# Patient Record
Sex: FEMALE | Race: WHITE | NOT HISPANIC OR LATINO | Employment: UNEMPLOYED | URBAN - METROPOLITAN AREA
[De-identification: names, ages, dates, MRNs, and addresses within clinical notes are randomized per-mention and may not be internally consistent; named-entity substitution may affect disease eponyms.]

---

## 2019-09-20 ENCOUNTER — HOSPITAL ENCOUNTER (OUTPATIENT)
Dept: FAMILY MEDICINE CLINIC | Facility: CLINIC | Age: 19
Discharge: HOME | End: 2019-09-20
Attending: NURSE PRACTITIONER

## 2019-09-22 LAB — BACTERIA UR CULT: NORMAL

## 2019-09-25 ENCOUNTER — HOSPITAL ENCOUNTER (OUTPATIENT)
Dept: URGENT CARE | Facility: CLINIC | Age: 19
Discharge: HOME | End: 2019-09-25
Attending: PHYSICIAN ASSISTANT

## 2019-10-21 ENCOUNTER — HOSPITAL ENCOUNTER (OUTPATIENT)
Dept: LABOR AND DELIVERY | Facility: HOSPITAL | Age: 19
Discharge: HOME | End: 2019-10-21
Attending: OBSTETRICS & GYNECOLOGY

## 2019-10-21 LAB — FIBRONECTIN FETAL VAG QL: NEGATIVE

## 2019-11-22 ENCOUNTER — HOSPITAL ENCOUNTER (OUTPATIENT)
Dept: PREADMISSION TESTING | Facility: HOSPITAL | Age: 19
Discharge: HOME | End: 2019-11-22
Attending: OBSTETRICS & GYNECOLOGY

## 2019-11-30 ENCOUNTER — HOSPITAL ENCOUNTER (OUTPATIENT)
Dept: URGENT CARE | Facility: CLINIC | Age: 19
Discharge: HOME | End: 2019-11-30
Attending: FAMILY MEDICINE

## 2019-12-08 ENCOUNTER — HOSPITAL ENCOUNTER (OUTPATIENT)
Dept: LABOR AND DELIVERY | Facility: HOSPITAL | Age: 19
Discharge: HOME | End: 2019-12-08
Attending: OBSTETRICS & GYNECOLOGY

## 2019-12-08 LAB
ALBUMIN SERPL-MCNC: 3.4 G/DL (ref 3.5–5)
ALBUMIN/GLOB SERPL: 1.3 {RATIO} (ref 1.4–2.6)
ALP SERPL-CCNC: 145 U/L (ref 42–98)
ALT SERPL-CCNC: 8 U/L (ref 10–40)
ANION GAP SERPL CALC-SCNC: 15 MMOL/L (ref 8–19)
AST SERPL-CCNC: 15 U/L (ref 15–50)
BASOPHILS # BLD AUTO: 0.03 10*3/UL (ref 0–0.2)
BASOPHILS NFR BLD AUTO: 0.4 % (ref 0–3)
BILIRUB SERPL-MCNC: 0.65 MG/DL (ref 0.2–1.3)
BUN SERPL-MCNC: 9 MG/DL (ref 5–25)
BUN/CREAT SERPL: 23 {RATIO} (ref 6–20)
CALCIUM SERPL-MCNC: 9.3 MG/DL (ref 8.7–10.4)
CHLORIDE SERPL-SCNC: 103 MMOL/L (ref 99–111)
CONV ABS IMM GRAN: 0.03 10*3/UL (ref 0–0.2)
CONV ALPHA-1-MICROGLOBULIN PLACENTAL (VAGINAL FLUID): NEGATIVE
CONV CO2: 20 MMOL/L (ref 22–32)
CONV IMMATURE GRAN: 0.4 % (ref 0–1.8)
CONV TOTAL PROTEIN: 6.1 G/DL (ref 6.3–8.2)
CREAT UR-MCNC: 0.4 MG/DL (ref 0.5–0.9)
DEPRECATED RDW RBC AUTO: 41.8 FL (ref 36.4–46.3)
EOSINOPHIL # BLD AUTO: 0.1 10*3/UL (ref 0–0.7)
EOSINOPHIL # BLD AUTO: 1.5 % (ref 0–7)
ERYTHROCYTE [DISTWIDTH] IN BLOOD BY AUTOMATED COUNT: 13.6 % (ref 11.7–14.4)
GFR SERPLBLD BASED ON 1.73 SQ M-ARVRAT: >60 ML/MIN/{1.73_M2}
GLOBULIN UR ELPH-MCNC: 2.7 G/DL (ref 2–3.5)
GLUCOSE SERPL-MCNC: 89 MG/DL (ref 65–99)
HCT VFR BLD AUTO: 27.8 % (ref 37–47)
HGB BLD-MCNC: 9.1 G/DL (ref 12–16)
LYMPHOCYTES # BLD AUTO: 0.91 10*3/UL (ref 1–5)
LYMPHOCYTES NFR BLD AUTO: 13.2 % (ref 20–45)
MCH RBC QN AUTO: 27.6 PG (ref 27–31)
MCHC RBC AUTO-ENTMCNC: 32.7 G/DL (ref 33–37)
MCV RBC AUTO: 84.2 FL (ref 81–99)
MONOCYTES # BLD AUTO: 0.65 10*3/UL (ref 0.2–1.2)
MONOCYTES NFR BLD AUTO: 9.4 % (ref 3–10)
NEUTROPHILS # BLD AUTO: 5.17 10*3/UL (ref 2–8)
NEUTROPHILS NFR BLD AUTO: 75.1 % (ref 30–85)
NRBC CBCN: 0 % (ref 0–0.7)
OSMOLALITY SERPL CALC.SUM OF ELEC: 276 MOSM/KG (ref 273–304)
PLATELET # BLD AUTO: 181 10*3/UL (ref 130–400)
PMV BLD AUTO: 9.6 FL (ref 9.4–12.3)
POTASSIUM SERPL-SCNC: 4 MMOL/L (ref 3.5–5.3)
RBC # BLD AUTO: 3.3 10*6/UL (ref 4.2–5.4)
SODIUM SERPL-SCNC: 134 MMOL/L (ref 135–147)
WBC # BLD AUTO: 6.89 10*3/UL (ref 4.8–10.8)

## 2019-12-25 ENCOUNTER — HOSPITAL ENCOUNTER (OUTPATIENT)
Dept: LABOR AND DELIVERY | Facility: HOSPITAL | Age: 19
Discharge: HOME | End: 2019-12-25
Attending: OBSTETRICS & GYNECOLOGY

## 2019-12-25 LAB — CONV ALPHA-1-MICROGLOBULIN PLACENTAL (VAGINAL FLUID): NEGATIVE

## 2020-05-07 ENCOUNTER — TELEMEDICINE CONVERTED (OUTPATIENT)
Dept: FAMILY MEDICINE CLINIC | Facility: CLINIC | Age: 20
End: 2020-05-07
Attending: NURSE PRACTITIONER

## 2020-08-11 ENCOUNTER — TELEPHONE CONVERTED (OUTPATIENT)
Dept: FAMILY MEDICINE CLINIC | Facility: CLINIC | Age: 20
End: 2020-08-11
Attending: NURSE PRACTITIONER

## 2021-05-13 NOTE — PROGRESS NOTES
Quick Note      Patient Name: Riri Martinez   Patient ID: [id number]   Sex: Female   YOB: 2000    Primary Care Provider: Iva JERONIMO    Visit Date: 2020    Provider: PHANI Merchant   Location: [facility]   Location Address: [address] [place] [address] [place]  [id number]   Location Phone: [phone]          History Of Present Illness  TELEHEALTH TELEPHONE VISIT  Chief Complaint: Anxiety and depression   Riri Martinez is a 20 year old /White female who is presenting for evaluation via telehealth telephone visit. Verbal consent obtained before beginning visit.   Provider spent 20 minutes with the patient during telehealth visit.   The following staff were present during this visit: JAGDEEP Charles   Past Medical History/Overview of Patient Symptoms  Riri Martinez is a 20 year old /White female who presents for evaluation and treatment of:      Call started @ 10:31am     She is pregnant again, she is considering an . She has a lot of stress. She says the father of the baby forced the pregnancy on her and now he doesn't want anythign to do with the baby. She has another baby at home who is 7 months    anxiety, she stopped the lexapro and she hasnt been able to go to work because her stress and anxiety are so bad. She needs to move in with her grandparents and needs a note from her doctor so that she can break her lease. Her rent is $850/ month. She would like something else that would be ok with pregnancy in case she decides to maintain the pregnancy    she has been sick for about a week. She thinks it's allergies, she has a productive cough and sinus drainage. She has a full bottle of amoxicillin but wasn't sure if she could take it with the pregnancy               Assessment  · Generalized anxiety disorder     300.02/F41.1  · URI (upper respiratory infection)     465.9/J06.9  · Unplanned  "pregnancy     V22.2/Z34.90      Plan  · Orders  o Physician Telephone Evaluation, 11-20 minutes (75158) - - 08/11/2020  o ACO-39: Current medications updated and reviewed () - - 08/11/2020  o ACO-14: Influenza immunization administered or previously received () - - 08/11/2020  · Medications  o Zoloft 50 mg oral tablet   SIG: take 1 tablet (50 mg) by oral route once daily   DISP: (30) tablets with 1 refills  Prescribed on 08/11/2020     o Lexapro 10 mg oral tablet   SIG: take 1 tablet (10 mg) by oral route once daily for 30 days   DISP: (30) tablets with 1 refills  Discontinued on 08/11/2020     o Medications have been Reconciled  o Transition of Care or Provider Policy  · Instructions  o Discussed the need for therapy, either with a certified counselor, psychologist, and/or family . If no improvement is noted or worsening of their condition, return to office or ER. But also discussed with patient that if they are non-responsive to the type of medication they may need to see a psychiatrist for further evaluation and management.  o Patient agrees to a \"No Self Harm\" contract. Patient will either call us, [facility], ER, [facility], [facility].  o Patient was given an SSRI/SSNRI medication and warned of possible side effects of the medication including potential for increased risk of suicidal thoughts and feelings. Patient was instructed that if they begin to exhibit any of these effects they will discontinue the medication immediately and contact our office or the ER ASAP.  o Plan Of Care: I gave her contact info for [facility], and we discussed adoption if she doesn' t want to keep the baby. I wrote a letter on her behalf to get her out of her lease. Will change to zoloft for the anxiety. She will use the amoxicillin that she has at home, if symptoms worsen I encouraged that she call back so I can order a covid test for her.  o Patient instructed to seek medical " attention urgently for new or worsening symptoms.  o Take all medications as prescribed/directed.  o Call the office with any concerns or questions.  o Discussed Covid-19 precautions including, but not limited to, social distancing, avoid touching your face, and hand washing.   · Disposition  o Call or Return if symptoms worsen or persist.            Electronically Signed by: PHANI Merchant -Author on August 11, 2020 11:26:22 AM

## 2021-05-13 NOTE — PROGRESS NOTES
"   Quick Note      Patient Name: Riri Martinez   Patient ID: [id number]   Sex: Female   YOB: 2000    Primary Care Provider: Iva JERONIMO    Visit Date: May 7, 2020    Provider: PHANI Velasquez   Location: [facility]   Location Address: [facility] [address]  [place]  [id number]   Location Phone: [phone]          History Of Present Illness  TELEHEALTH TELEPHONE VISIT  Chief Complaint: Anxiety   Riri Martinez is a 20 year old /White female who is presenting for evaluation via telehealth telephone visit. Verbal consent obtained before beginning visit.   Provider spent 9:21-9:36 minutes with the patient during telehealth visit.   The following staff were present during this visit: Amalia,ALICIA, pt, kcw(self)   Past Medical History/Overview of Patient Symptoms  Riri Martinez is a 20 year old /White female who presents for evaluation and treatment of:      Pt unable to do zoom    She has had a lot of stress and 3 weeks.  She feels that she has had a  to drive her around just to get her mind going.  No SI/HI noted.  She just doesn't even want to go to work.  She is back home.  [facility]  She has her own place.  She has her mom to help with the baby.  They do not talk right now (baby dad).  They are in a custody louis right now.  She has called in the last 2 Mondays and it has been bad.  She wakes up feeling like \"a rainy day\".  She would like to take something to ease her mind.  SHe is worried it will get like she did in 2017.  She feels that her anxiety will get the best of her.  She doesn't want to lose her place.           Assessment  · Generalized anxiety disorder     300.02/F41.1      Plan  · Orders  o Physician Telephone Evaluation, 11-20 minutes (18054) - - 05/07/2020  o ACO-39: Current medications updated and reviewed () - - 05/07/2020  o ACO-14: Influenza immunization administered or previously " received () - - 05/07/2020  · Medications  o Lexapro 10 mg oral tablet   SIG: take 1 tablet (10 mg) by oral route once daily for 30 days   DISP: (30) tablets with 1 refills  Prescribed on 05/07/2020     o Medications have been Reconciled  o Transition of Care or Provider Policy  · Instructions  o Plan Of Care:   o Take all medications as prescribed/directed.  o Call the office with any concerns or questions.  o She discussed about work. She will cont to work right now but if it becomes too overwhelming (she feels she is having another 2017 breakdown) then we will hold from working.   o We will start Lexapro at 10mg and she will call me in 2 weeks to let know how she doing and we may need to incrase to 20 but we will see how things go. Any SI/HI stop med and seek help. Call with any concerns or questions.   o Electronically Identified Patient Education Materials Provided Electronically  · Disposition  o Call or Return if symptoms worsen or persist.            Electronically Signed by: PHANI Velasquez -Author on May 7, 2020 09:37:26 AM

## 2021-07-22 ENCOUNTER — HOSPITAL ENCOUNTER (EMERGENCY)
Facility: HOSPITAL | Age: 21
Discharge: HOME | End: 2021-07-22
Attending: EMERGENCY MEDICINE | Admitting: EMERGENCY MEDICINE

## 2021-07-22 ENCOUNTER — APPOINTMENT (OUTPATIENT)
Dept: ULTRASOUND IMAGING | Facility: HOSPITAL | Age: 21
End: 2021-07-22

## 2021-07-22 VITALS
WEIGHT: 104.06 LBS | SYSTOLIC BLOOD PRESSURE: 103 MMHG | RESPIRATION RATE: 16 BRPM | HEIGHT: 59 IN | DIASTOLIC BLOOD PRESSURE: 64 MMHG | TEMPERATURE: 98.3 F | OXYGEN SATURATION: 98 % | HEART RATE: 84 BPM | BODY MASS INDEX: 20.98 KG/M2

## 2021-07-22 DIAGNOSIS — O20.0 MISCARRIAGE, THREATENED, EARLY PREGNANCY: Primary | ICD-10-CM

## 2021-07-22 LAB
ABO GROUP BLD: NORMAL
ALBUMIN SERPL-MCNC: 4.6 G/DL (ref 3.5–5.2)
ALBUMIN/GLOB SERPL: 2.1 G/DL
ALP SERPL-CCNC: 51 U/L (ref 39–117)
ALT SERPL W P-5'-P-CCNC: 8 U/L (ref 1–33)
ANION GAP SERPL CALCULATED.3IONS-SCNC: 13.3 MMOL/L (ref 5–15)
AST SERPL-CCNC: 10 U/L (ref 1–32)
BASOPHILS # BLD AUTO: 0.03 10*3/MM3 (ref 0–0.2)
BASOPHILS NFR BLD AUTO: 0.5 % (ref 0–1.5)
BILIRUB SERPL-MCNC: 0.5 MG/DL (ref 0–1.2)
BUN SERPL-MCNC: 14 MG/DL (ref 6–20)
BUN/CREAT SERPL: 22.2 (ref 7–25)
CALCIUM SPEC-SCNC: 9 MG/DL (ref 8.6–10.5)
CHLORIDE SERPL-SCNC: 104 MMOL/L (ref 98–107)
CO2 SERPL-SCNC: 21.7 MMOL/L (ref 22–29)
CREAT SERPL-MCNC: 0.63 MG/DL (ref 0.57–1)
DEPRECATED RDW RBC AUTO: 45.9 FL (ref 37–54)
EOSINOPHIL # BLD AUTO: 0.11 10*3/MM3 (ref 0–0.4)
EOSINOPHIL NFR BLD AUTO: 1.7 % (ref 0.3–6.2)
ERYTHROCYTE [DISTWIDTH] IN BLOOD BY AUTOMATED COUNT: 15.3 % (ref 12.3–15.4)
GFR SERPL CREATININE-BSD FRML MDRD: 117 ML/MIN/1.73
GLOBULIN UR ELPH-MCNC: 2.2 GM/DL
GLUCOSE SERPL-MCNC: 119 MG/DL (ref 65–99)
HCG INTACT+B SERPL-ACNC: 1238 MIU/ML
HCT VFR BLD AUTO: 31.7 % (ref 34–46.6)
HGB BLD-MCNC: 10.1 G/DL (ref 12–15.9)
IMM GRANULOCYTES # BLD AUTO: 0.02 10*3/MM3 (ref 0–0.05)
IMM GRANULOCYTES NFR BLD AUTO: 0.3 % (ref 0–0.5)
LYMPHOCYTES # BLD AUTO: 0.95 10*3/MM3 (ref 0.7–3.1)
LYMPHOCYTES NFR BLD AUTO: 14.6 % (ref 19.6–45.3)
MCH RBC QN AUTO: 26.1 PG (ref 26.6–33)
MCHC RBC AUTO-ENTMCNC: 31.9 G/DL (ref 31.5–35.7)
MCV RBC AUTO: 81.9 FL (ref 79–97)
MONOCYTES # BLD AUTO: 0.52 10*3/MM3 (ref 0.1–0.9)
MONOCYTES NFR BLD AUTO: 8 % (ref 5–12)
NEUTROPHILS NFR BLD AUTO: 4.86 10*3/MM3 (ref 1.7–7)
NEUTROPHILS NFR BLD AUTO: 74.9 % (ref 42.7–76)
NRBC BLD AUTO-RTO: 0 /100 WBC (ref 0–0.2)
PLATELET # BLD AUTO: 226 10*3/MM3 (ref 140–450)
PMV BLD AUTO: 10.4 FL (ref 6–12)
POTASSIUM SERPL-SCNC: 3.7 MMOL/L (ref 3.5–5.2)
PROT SERPL-MCNC: 6.8 G/DL (ref 6–8.5)
RBC # BLD AUTO: 3.87 10*6/MM3 (ref 3.77–5.28)
RH BLD: POSITIVE
SODIUM SERPL-SCNC: 139 MMOL/L (ref 136–145)
WBC # BLD AUTO: 6.49 10*3/MM3 (ref 3.4–10.8)

## 2021-07-22 PROCEDURE — 36415 COLL VENOUS BLD VENIPUNCTURE: CPT

## 2021-07-22 PROCEDURE — 86901 BLOOD TYPING SEROLOGIC RH(D): CPT | Performed by: PHYSICIAN ASSISTANT

## 2021-07-22 PROCEDURE — 85025 COMPLETE CBC W/AUTO DIFF WBC: CPT | Performed by: PHYSICIAN ASSISTANT

## 2021-07-22 PROCEDURE — 99282 EMERGENCY DEPT VISIT SF MDM: CPT

## 2021-07-22 PROCEDURE — 86900 BLOOD TYPING SEROLOGIC ABO: CPT | Performed by: PHYSICIAN ASSISTANT

## 2021-07-22 PROCEDURE — 80053 COMPREHEN METABOLIC PANEL: CPT | Performed by: PHYSICIAN ASSISTANT

## 2021-07-22 PROCEDURE — 99283 EMERGENCY DEPT VISIT LOW MDM: CPT

## 2021-07-22 PROCEDURE — 76817 TRANSVAGINAL US OBSTETRIC: CPT

## 2021-07-22 PROCEDURE — 84702 CHORIONIC GONADOTROPIN TEST: CPT | Performed by: PHYSICIAN ASSISTANT

## 2021-11-04 ENCOUNTER — HOSPITAL ENCOUNTER (EMERGENCY)
Facility: HOSPITAL | Age: 21
Discharge: HOME | End: 2021-11-04
Attending: EMERGENCY MEDICINE | Admitting: EMERGENCY MEDICINE

## 2021-11-04 ENCOUNTER — APPOINTMENT (OUTPATIENT)
Dept: CT IMAGING | Facility: HOSPITAL | Age: 21
End: 2021-11-04

## 2021-11-04 VITALS
RESPIRATION RATE: 16 BRPM | TEMPERATURE: 99.1 F | WEIGHT: 105.6 LBS | BODY MASS INDEX: 21.29 KG/M2 | SYSTOLIC BLOOD PRESSURE: 95 MMHG | HEIGHT: 59 IN | HEART RATE: 94 BPM | DIASTOLIC BLOOD PRESSURE: 64 MMHG | OXYGEN SATURATION: 98 %

## 2021-11-04 DIAGNOSIS — N39.0 ACUTE UTI: Primary | ICD-10-CM

## 2021-11-04 LAB
ALBUMIN SERPL-MCNC: 4.7 G/DL (ref 3.5–5.2)
ALBUMIN/GLOB SERPL: 1.6 G/DL
ALP SERPL-CCNC: 49 U/L (ref 39–117)
ALT SERPL W P-5'-P-CCNC: 8 U/L (ref 1–33)
ANION GAP SERPL CALCULATED.3IONS-SCNC: 11.2 MMOL/L (ref 5–15)
AST SERPL-CCNC: 11 U/L (ref 1–32)
BACTERIA UR QL AUTO: ABNORMAL /HPF
BASOPHILS # BLD AUTO: 0.04 10*3/MM3 (ref 0–0.2)
BASOPHILS NFR BLD AUTO: 0.8 % (ref 0–1.5)
BILIRUB SERPL-MCNC: 0.6 MG/DL (ref 0–1.2)
BILIRUB UR QL STRIP: NEGATIVE
BUN SERPL-MCNC: 11 MG/DL (ref 6–20)
BUN/CREAT SERPL: 19.3 (ref 7–25)
CALCIUM SPEC-SCNC: 9.1 MG/DL (ref 8.6–10.5)
CHLORIDE SERPL-SCNC: 104 MMOL/L (ref 98–107)
CLARITY UR: ABNORMAL
CO2 SERPL-SCNC: 21.8 MMOL/L (ref 22–29)
COLOR UR: YELLOW
CREAT SERPL-MCNC: 0.57 MG/DL (ref 0.57–1)
D-LACTATE SERPL-SCNC: 1.1 MMOL/L (ref 0.5–2)
DEPRECATED RDW RBC AUTO: 47.2 FL (ref 37–54)
EOSINOPHIL # BLD AUTO: 0.04 10*3/MM3 (ref 0–0.4)
EOSINOPHIL NFR BLD AUTO: 0.8 % (ref 0.3–6.2)
ERYTHROCYTE [DISTWIDTH] IN BLOOD BY AUTOMATED COUNT: 15.5 % (ref 12.3–15.4)
GFR SERPL CREATININE-BSD FRML MDRD: 131 ML/MIN/1.73
GLOBULIN UR ELPH-MCNC: 2.9 GM/DL
GLUCOSE SERPL-MCNC: 98 MG/DL (ref 65–99)
GLUCOSE UR STRIP-MCNC: NEGATIVE MG/DL
HCT VFR BLD AUTO: 33.4 % (ref 34–46.6)
HGB BLD-MCNC: 10.8 G/DL (ref 12–15.9)
HGB UR QL STRIP.AUTO: ABNORMAL
HOLD SPECIMEN: NORMAL
HOLD SPECIMEN: NORMAL
HYALINE CASTS UR QL AUTO: ABNORMAL /LPF
IMM GRANULOCYTES # BLD AUTO: 0.02 10*3/MM3 (ref 0–0.05)
IMM GRANULOCYTES NFR BLD AUTO: 0.4 % (ref 0–0.5)
KETONES UR QL STRIP: ABNORMAL
LEUKOCYTE ESTERASE UR QL STRIP.AUTO: NEGATIVE
LYMPHOCYTES # BLD AUTO: 0.22 10*3/MM3 (ref 0.7–3.1)
LYMPHOCYTES NFR BLD AUTO: 4.3 % (ref 19.6–45.3)
MCH RBC QN AUTO: 26.9 PG (ref 26.6–33)
MCHC RBC AUTO-ENTMCNC: 32.3 G/DL (ref 31.5–35.7)
MCV RBC AUTO: 83.3 FL (ref 79–97)
MONOCYTES # BLD AUTO: 0.58 10*3/MM3 (ref 0.1–0.9)
MONOCYTES NFR BLD AUTO: 11.2 % (ref 5–12)
NEUTROPHILS NFR BLD AUTO: 4.27 10*3/MM3 (ref 1.7–7)
NEUTROPHILS NFR BLD AUTO: 82.5 % (ref 42.7–76)
NITRITE UR QL STRIP: NEGATIVE
NRBC BLD AUTO-RTO: 0 /100 WBC (ref 0–0.2)
PH UR STRIP.AUTO: 5.5 [PH] (ref 5–8)
PLATELET # BLD AUTO: 177 10*3/MM3 (ref 140–450)
PMV BLD AUTO: 10 FL (ref 6–12)
POTASSIUM SERPL-SCNC: 3.8 MMOL/L (ref 3.5–5.2)
PROT SERPL-MCNC: 7.6 G/DL (ref 6–8.5)
PROT UR QL STRIP: ABNORMAL
RBC # BLD AUTO: 4.01 10*6/MM3 (ref 3.77–5.28)
RBC # UR: ABNORMAL /HPF
REF LAB TEST METHOD: ABNORMAL
RENAL EPI CELLS #/AREA URNS HPF: ABNORMAL /HPF
SODIUM SERPL-SCNC: 137 MMOL/L (ref 136–145)
SP GR UR STRIP: 1.03 (ref 1–1.03)
SQUAMOUS #/AREA URNS HPF: ABNORMAL /HPF
UROBILINOGEN UR QL STRIP: ABNORMAL
WBC # BLD AUTO: 5.17 10*3/MM3 (ref 3.4–10.8)
WBC UR QL AUTO: ABNORMAL /HPF
WHOLE BLOOD HOLD SPECIMEN: NORMAL
WHOLE BLOOD HOLD SPECIMEN: NORMAL

## 2021-11-04 PROCEDURE — 0 IOPAMIDOL PER 1 ML: Performed by: EMERGENCY MEDICINE

## 2021-11-04 PROCEDURE — 81001 URINALYSIS AUTO W/SCOPE: CPT | Performed by: EMERGENCY MEDICINE

## 2021-11-04 PROCEDURE — 83605 ASSAY OF LACTIC ACID: CPT | Performed by: EMERGENCY MEDICINE

## 2021-11-04 PROCEDURE — 74177 CT ABD & PELVIS W/CONTRAST: CPT

## 2021-11-04 PROCEDURE — 96365 THER/PROPH/DIAG IV INF INIT: CPT

## 2021-11-04 PROCEDURE — 80053 COMPREHEN METABOLIC PANEL: CPT | Performed by: EMERGENCY MEDICINE

## 2021-11-04 PROCEDURE — 36415 COLL VENOUS BLD VENIPUNCTURE: CPT

## 2021-11-04 PROCEDURE — 25010000002 CEFTRIAXONE PER 250 MG: Performed by: EMERGENCY MEDICINE

## 2021-11-04 PROCEDURE — 25010000002 ONDANSETRON PER 1 MG: Performed by: EMERGENCY MEDICINE

## 2021-11-04 PROCEDURE — 87040 BLOOD CULTURE FOR BACTERIA: CPT | Performed by: EMERGENCY MEDICINE

## 2021-11-04 PROCEDURE — 96375 TX/PRO/DX INJ NEW DRUG ADDON: CPT

## 2021-11-04 PROCEDURE — 25010000002 KETOROLAC TROMETHAMINE PER 15 MG: Performed by: EMERGENCY MEDICINE

## 2021-11-04 PROCEDURE — 85025 COMPLETE CBC W/AUTO DIFF WBC: CPT | Performed by: EMERGENCY MEDICINE

## 2021-11-04 PROCEDURE — 99283 EMERGENCY DEPT VISIT LOW MDM: CPT

## 2021-11-04 RX ORDER — CEFTRIAXONE SODIUM 1 G/50ML
1 INJECTION, SOLUTION INTRAVENOUS ONCE
Status: COMPLETED | OUTPATIENT
Start: 2021-11-04 | End: 2021-11-04

## 2021-11-04 RX ORDER — ONDANSETRON 2 MG/ML
4 INJECTION INTRAMUSCULAR; INTRAVENOUS ONCE
Status: COMPLETED | OUTPATIENT
Start: 2021-11-04 | End: 2021-11-04

## 2021-11-04 RX ORDER — SODIUM CHLORIDE 0.9 % (FLUSH) 0.9 %
10 SYRINGE (ML) INJECTION AS NEEDED
Status: DISCONTINUED | OUTPATIENT
Start: 2021-11-04 | End: 2021-11-04 | Stop reason: HOSPADM

## 2021-11-04 RX ORDER — CEPHALEXIN 500 MG/1
500 CAPSULE ORAL 4 TIMES DAILY
Qty: 40 CAPSULE | Refills: 0 | Status: SHIPPED | OUTPATIENT
Start: 2021-11-04 | End: 2022-07-19

## 2021-11-04 RX ORDER — KETOROLAC TROMETHAMINE 30 MG/ML
30 INJECTION, SOLUTION INTRAMUSCULAR; INTRAVENOUS ONCE
Status: COMPLETED | OUTPATIENT
Start: 2021-11-04 | End: 2021-11-04

## 2021-11-04 RX ADMIN — ONDANSETRON 4 MG: 2 INJECTION INTRAMUSCULAR; INTRAVENOUS at 17:55

## 2021-11-04 RX ADMIN — KETOROLAC TROMETHAMINE 30 MG: 30 INJECTION, SOLUTION INTRAMUSCULAR at 17:55

## 2021-11-04 RX ADMIN — IOPAMIDOL 75 ML: 755 INJECTION, SOLUTION INTRAVENOUS at 19:30

## 2021-11-04 RX ADMIN — SODIUM CHLORIDE 1000 ML: 9 INJECTION, SOLUTION INTRAVENOUS at 17:54

## 2021-11-04 RX ADMIN — CEFTRIAXONE SODIUM 1 G: 1 INJECTION, SOLUTION INTRAVENOUS at 20:47

## 2021-11-04 NOTE — ED PROVIDER NOTES
Time: 5:34 PM EDT  Arrived by: private car  Chief Complaint: fever  History provided by: patient  History is limited by: N/A     History of Present Illness:  Patient is a 21 y.o. year old female that presents to the emergency department with fever, chills, muscle ache, and decreased urination that started earlier today.  Patient denies cough and hemoptysis.  She does report nausea with no emesis.  Patient denies diarrhea.  She does report lower abdominal pain with no radiation.  She states that the pain is better when staying still and worse with movement and palpation.        Abdominal Pain  Pain location:  Suprapubic  Pain quality: dull    Pain radiates to:  Does not radiate  Pain severity:  Mild  Onset quality:  Sudden  Duration:  1 day  Timing:  Constant  Progression:  Worsening  Chronicity:  New  Associated symptoms: dysuria, fever and nausea    Associated symptoms: no chest pain, no chills, no cough, no diarrhea, no shortness of breath, no sore throat and no vomiting    Fever  Associated symptoms: dysuria and nausea    Associated symptoms: no chest pain, no chills, no congestion, no cough, no diarrhea, no headaches, no myalgias, no rhinorrhea, no sore throat and no vomiting    Nausea  The primary symptoms include fever, abdominal pain, nausea and dysuria. Primary symptoms do not include vomiting, diarrhea or myalgias.   The illness does not include chills.   Dysuria  Associated symptoms: abdominal pain, fever and nausea    Associated symptoms: no vomiting        Similar Symptoms Previously: no  Recently seen: no      Patient Care Team  Primary Care Provider: Provider, No Known    Past Medical History:     Allergies   Allergen Reactions   • Latex Unknown - Low Severity   • Adhesive Tape Rash     Past Medical History:   Diagnosis Date   • Arthritis    • Chronic back pain      Past Surgical History:   Procedure Laterality Date   • BACK SURGERY      tethered cord   • HIP SURGERY Left    • REPAIR PATELLAR TENDON  "Bilateral      History reviewed. No pertinent family history.    Home Medications:  Prior to Admission medications    Medication Sig Start Date End Date Taking? Authorizing Provider   Prenatal MV-Min-Fe Fum-FA-DHA (PRENATAL 1 PO) Take  by mouth.    Provider, MD Jimena        Social History:   Social History     Tobacco Use   • Smoking status: Never Smoker   • Smokeless tobacco: Never Used   Substance Use Topics   • Alcohol use: Never   • Drug use: Never     Recent travel: no     Review of Systems:  Review of Systems   Constitutional: Positive for fever. Negative for chills.   HENT: Negative for congestion, rhinorrhea and sore throat.    Eyes: Negative for pain and visual disturbance.   Respiratory: Negative for apnea, cough, chest tightness and shortness of breath.    Cardiovascular: Negative for chest pain and palpitations.   Gastrointestinal: Positive for abdominal pain and nausea. Negative for diarrhea and vomiting.   Genitourinary: Positive for dysuria. Negative for difficulty urinating.   Musculoskeletal: Negative for joint swelling and myalgias.   Skin: Negative for color change.   Neurological: Negative for seizures and headaches.   Psychiatric/Behavioral: Negative.    All other systems reviewed and are negative.       Physical Exam:  BP 99/60   Pulse 96   Temp (!) 101 °F (38.3 °C) (Oral)   Resp 18   Ht 149.9 cm (59\")   Wt 47.9 kg (105 lb 9.6 oz)   LMP 10/08/2021   SpO2 98%   Breastfeeding Unknown   BMI 21.33 kg/m²     Physical Exam  Vitals and nursing note reviewed.   Constitutional:       General: She is not in acute distress.     Appearance: Normal appearance. She is not toxic-appearing.   HENT:      Head: Normocephalic and atraumatic.      Jaw: There is normal jaw occlusion.   Eyes:      General: Lids are normal.      Extraocular Movements: Extraocular movements intact.      Conjunctiva/sclera: Conjunctivae normal.      Pupils: Pupils are equal, round, and reactive to light. "   Cardiovascular:      Rate and Rhythm: Normal rate and regular rhythm.      Pulses: Normal pulses.      Heart sounds: Normal heart sounds.   Pulmonary:      Effort: Pulmonary effort is normal. No respiratory distress.      Breath sounds: Normal breath sounds. No wheezing or rhonchi.   Abdominal:      General: Abdomen is flat.      Palpations: Abdomen is soft.      Tenderness: There is no abdominal tenderness. There is no guarding or rebound.   Musculoskeletal:         General: Normal range of motion.      Cervical back: Normal range of motion and neck supple.      Right lower leg: No edema.      Left lower leg: No edema.   Skin:     General: Skin is warm and dry.   Neurological:      Mental Status: She is alert and oriented to person, place, and time. Mental status is at baseline.   Psychiatric:         Mood and Affect: Mood normal.                Medications in the Emergency Department:  Medications   sodium chloride 0.9 % flush 10 mL (has no administration in time range)   cefTRIAXone (ROCEPHIN) IVPB 1 g (has no administration in time range)   sodium chloride 0.9 % bolus 1,000 mL (1,000 mL Intravenous New Bag 11/4/21 1754)   ondansetron (ZOFRAN) injection 4 mg (4 mg Intravenous Given 11/4/21 1755)   ketorolac (TORADOL) injection 30 mg (30 mg Intravenous Given 11/4/21 1755)   iopamidol (ISOVUE-370) 76 % injection 100 mL (75 mL Intravenous Given 11/4/21 1930)        Labs  Lab Results (last 24 hours)     Procedure Component Value Units Date/Time    CBC & Differential [[id number]]  (Abnormal) Collected: 11/04/21 1719    Specimen: Blood from Arm, Right Updated: 11/04/21 1727    Narrative:      The following orders were created for panel order CBC & Differential.  Procedure                               Abnormality         Status                     ---------                               -----------         ------                     CBC Auto Differential[[id number]]        Abnormal            Final result                  Please view results for these tests on the individual orders.    Comprehensive Metabolic Panel [[id number]]  (Abnormal) Collected: 11/04/21 1719    Specimen: Blood from Arm, Right Updated: 11/04/21 1759     Glucose 98 mg/dL      BUN 11 mg/dL      Creatinine 0.57 mg/dL      Sodium 137 mmol/L      Potassium 3.8 mmol/L      Chloride 104 mmol/L      CO2 21.8 mmol/L      Calcium 9.1 mg/dL      Total Protein 7.6 g/dL      Albumin 4.70 g/dL      ALT (SGPT) 8 U/L      AST (SGOT) 11 U/L      Alkaline Phosphatase 49 U/L      Total Bilirubin 0.6 mg/dL      eGFR Non African Amer 131 mL/min/1.73      Globulin 2.9 gm/dL      A/G Ratio 1.6 g/dL      BUN/Creatinine Ratio 19.3     Anion Gap 11.2 mmol/L     Narrative:      GFR Normal >60  Chronic Kidney Disease <60  Kidney Failure <15      Lactic Acid, Plasma [[id number]]  (Normal) Collected: 11/04/21 1719    Specimen: Blood from Arm, Right Updated: 11/04/21 1755     Lactate 1.1 mmol/L     Blood Culture - Blood, Arm, Right [[id number]] Collected: 11/04/21 1719    Specimen: Blood from Arm, Right Updated: 11/04/21 1723    CBC Auto Differential [[id number]]  (Abnormal) Collected: 11/04/21 1719    Specimen: Blood from Arm, Right Updated: 11/04/21 1727     WBC 5.17 10*3/mm3      RBC 4.01 10*6/mm3      Hemoglobin 10.8 g/dL      Hematocrit 33.4 %      MCV 83.3 fL      MCH 26.9 pg      MCHC 32.3 g/dL      RDW 15.5 %      RDW-SD 47.2 fl      MPV 10.0 fL      Platelets 177 10*3/mm3      Neutrophil % 82.5 %      Lymphocyte % 4.3 %      Monocyte % 11.2 %      Eosinophil % 0.8 %      Basophil % 0.8 %      Immature Grans % 0.4 %      Neutrophils, Absolute 4.27 10*3/mm3      Lymphocytes, Absolute 0.22 10*3/mm3      Monocytes, Absolute 0.58 10*3/mm3      Eosinophils, Absolute 0.04 10*3/mm3      Basophils, Absolute 0.04 10*3/mm3      Immature Grans, Absolute 0.02 10*3/mm3      nRBC 0.0 /100 WBC     Blood Culture - Blood, Arm, Right [[id number]] Collected: 11/04/21 7249    Specimen: Blood from Arm,  Right Updated: 11/04/21 1734    Urinalysis With Culture If Indicated - Urine, Clean Catch [[id number]]  (Abnormal) Collected: 11/04/21 1758    Specimen: Urine, Clean Catch Updated: 11/04/21 1900     Color, UA Yellow     Appearance, UA Turbid     pH, UA 5.5     Specific Gravity, UA 1.027     Glucose, UA Negative     Ketones, UA 40 mg/dL (2+)     Bilirubin, UA Negative     Blood, UA Trace     Protein, UA Trace     Leuk Esterase, UA Negative     Nitrite, UA Negative     Urobilinogen, UA 0.2 E.U./dL    Urinalysis, Microscopic Only - Urine, Clean Catch [[id number]]  (Abnormal) Collected: 11/04/21 1758    Specimen: Urine, Clean Catch Updated: 11/04/21 1900     RBC, UA 3-5 /HPF      WBC, UA 3-5 /HPF      Bacteria, UA Trace /HPF      Squamous Epithelial Cells, UA 7-12 /HPF      Renal Epithelial Cells, UA 3-6 /HPF      Hyaline Casts, UA None Seen /LPF      Methodology Manual Light Microscopy           Imaging:  CT Abdomen Pelvis With Contrast    Result Date: 11/4/2021  PROCEDURE: CT ABDOMEN PELVIS W CONTRAST  COMPARISON: [facility], CT, ABDOMEN/PELVIS WITH CONTRAST, 4/14/2016, 22:57.  INDICATIONS: left abdominal pain today  TECHNIQUE: CT images were created with non-ionic intravenous contrast material.   PROTOCOL:   Standard imaging protocol performed    RADIATION:   DLP: 264.1mGy*cm   Automated exposure control was utilized to minimize radiation dose. CONTRAST: 75cc Isovue 370 I.V.  FINDINGS:  The lung bases are clear.  The liver is of normal size and uniform density.  The gallbladder is not abnormally distended.  No pancreatic or adrenal mass is evident.  The spleen is of normal size.  The kidneys enhance bilaterally.  No renal or ureteral stones are seen.  There is no evidence of hydronephrosis.  The urinary bladder is not abnormally distended.  The uterus and ovaries have an unremarkable appearance.  Bowel loops are not abnormally dilated.  The anterior abdominal wall is intact, no hernia is evident.  Bony  structures appear intact.  CONCLUSION: Negative CT scan of the abdomen and pelvis with IV contrast.     NICOLE LOYD MD       Electronically Signed and Approved By: NICOLE LOYD MD on 11/04/2021 at 19:47               Procedures:  Procedures    Progress                            Medical Decision Making:  MDM  Number of Diagnoses or Management Options  Acute UTI  Diagnosis management comments: The patient´s CBC was reviewed and shows no abnormalities of critical concern. Of note, there is no anemia requiring a blood transfusion and the platelet count is acceptable.  The patient´s CMP was reviewed and shows no abnormalities of critical concern. Of note, the patient´s sodium and potassium are acceptable. The patient´s liver enzymes are unremarkable. The patient´s renal function (creatinine) is preserved. The patient has a normal anion gap.  Urinalysis shows trace bacteriuria with white blood cells.  Patient was given Rocephin in the emergency department.  Patient also also given a 1 L normal saline bolus.  CT scan the abdomen pelvis is negative for acute intra-abdominal pathology.  Based on the results of the patient´s urinalysis and urinary complaints, signs, symptoms, and diagnostic testing is consistent with a urinary tract infection. Patient is resting comfortably, is alert, and is in no distress. The repeat examination is unremarkable and benign. The patient has no signs of urosepsis. The patient was started on antibiotics in the emergency department and will be discharged with antibiotics as an outpatient. The patient was counseled to return to the ER for fever >100.5, intractable pain or vomiting, or any other concerns that the may have. The patient has expressed a clear and thorough understanding and agreed to follow up as instructed.        Amount and/or Complexity of Data Reviewed  Clinical lab tests: reviewed  Tests in the radiology section of CPT®: reviewed  Independent visualization of images,  tracings, or specimens: yes    Risk of Complications, Morbidity, and/or Mortality  Presenting problems: moderate  Management options: moderate         Final diagnoses:   Acute UTI        Disposition:  ED Disposition     ED Disposition Condition Comment    Discharge Stable           This medical record created using voice recognition software and may contain unintended errors.             Savanna Duffy MD  11/04/21 2040

## 2021-11-09 LAB
BACTERIA SPEC AEROBE CULT: NORMAL
BACTERIA SPEC AEROBE CULT: NORMAL

## 2022-01-16 ENCOUNTER — HOSPITAL ENCOUNTER (EMERGENCY)
Facility: HOSPITAL | Age: 22
Discharge: HOME | End: 2022-01-17
Attending: EMERGENCY MEDICINE | Admitting: EMERGENCY MEDICINE

## 2022-01-16 VITALS
HEART RATE: 108 BPM | OXYGEN SATURATION: 100 % | HEIGHT: 59 IN | TEMPERATURE: 98.2 F | WEIGHT: 98.55 LBS | SYSTOLIC BLOOD PRESSURE: 111 MMHG | RESPIRATION RATE: 14 BRPM | BODY MASS INDEX: 19.87 KG/M2 | DIASTOLIC BLOOD PRESSURE: 66 MMHG

## 2022-01-16 DIAGNOSIS — O21.0 HYPEREMESIS GRAVIDARUM: Primary | ICD-10-CM

## 2022-01-16 PROCEDURE — 36415 COLL VENOUS BLD VENIPUNCTURE: CPT

## 2022-01-16 PROCEDURE — 99283 EMERGENCY DEPT VISIT LOW MDM: CPT

## 2022-01-16 PROCEDURE — 84702 CHORIONIC GONADOTROPIN TEST: CPT | Performed by: EMERGENCY MEDICINE

## 2022-01-16 PROCEDURE — 85025 COMPLETE CBC W/AUTO DIFF WBC: CPT | Performed by: EMERGENCY MEDICINE

## 2022-01-16 PROCEDURE — 83690 ASSAY OF LIPASE: CPT | Performed by: EMERGENCY MEDICINE

## 2022-01-16 PROCEDURE — 80053 COMPREHEN METABOLIC PANEL: CPT | Performed by: EMERGENCY MEDICINE

## 2022-01-16 RX ORDER — SODIUM CHLORIDE 0.9 % (FLUSH) 0.9 %
10 SYRINGE (ML) INJECTION AS NEEDED
Status: DISCONTINUED | OUTPATIENT
Start: 2022-01-16 | End: 2022-01-17 | Stop reason: HOSPADM

## 2022-01-17 LAB
ALBUMIN SERPL-MCNC: 4.7 G/DL (ref 3.5–5.2)
ALBUMIN/GLOB SERPL: 1.4 G/DL
ALP SERPL-CCNC: 53 U/L (ref 39–117)
ALT SERPL W P-5'-P-CCNC: 9 U/L (ref 1–33)
ANION GAP SERPL CALCULATED.3IONS-SCNC: 15.1 MMOL/L (ref 5–15)
AST SERPL-CCNC: 15 U/L (ref 1–32)
BACTERIA UR QL AUTO: ABNORMAL /HPF
BASOPHILS # BLD AUTO: 0.02 10*3/MM3 (ref 0–0.2)
BASOPHILS NFR BLD AUTO: 0.3 % (ref 0–1.5)
BILIRUB SERPL-MCNC: 0.6 MG/DL (ref 0–1.2)
BILIRUB UR QL STRIP: NEGATIVE
BUN SERPL-MCNC: 12 MG/DL (ref 6–20)
BUN/CREAT SERPL: 21.8 (ref 7–25)
CALCIUM SPEC-SCNC: 9.8 MG/DL (ref 8.6–10.5)
CHLORIDE SERPL-SCNC: 101 MMOL/L (ref 98–107)
CLARITY UR: ABNORMAL
CO2 SERPL-SCNC: 21.9 MMOL/L (ref 22–29)
COLOR UR: YELLOW
CREAT SERPL-MCNC: 0.55 MG/DL (ref 0.57–1)
DEPRECATED RDW RBC AUTO: 45.5 FL (ref 37–54)
EOSINOPHIL # BLD AUTO: 0.03 10*3/MM3 (ref 0–0.4)
EOSINOPHIL NFR BLD AUTO: 0.5 % (ref 0.3–6.2)
ERYTHROCYTE [DISTWIDTH] IN BLOOD BY AUTOMATED COUNT: 15.2 % (ref 12.3–15.4)
GFR SERPL CREATININE-BSD FRML MDRD: 136 ML/MIN/1.73
GLOBULIN UR ELPH-MCNC: 3.3 GM/DL
GLUCOSE SERPL-MCNC: 119 MG/DL (ref 65–99)
GLUCOSE UR STRIP-MCNC: NEGATIVE MG/DL
HCG INTACT+B SERPL-ACNC: NORMAL MIU/ML
HCT VFR BLD AUTO: 36 % (ref 34–46.6)
HGB BLD-MCNC: 11.9 G/DL (ref 12–15.9)
HGB UR QL STRIP.AUTO: NEGATIVE
HOLD SPECIMEN: NORMAL
HOLD SPECIMEN: NORMAL
HYALINE CASTS UR QL AUTO: ABNORMAL /LPF
IMM GRANULOCYTES # BLD AUTO: 0.02 10*3/MM3 (ref 0–0.05)
IMM GRANULOCYTES NFR BLD AUTO: 0.3 % (ref 0–0.5)
KETONES UR QL STRIP: ABNORMAL
LEUKOCYTE ESTERASE UR QL STRIP.AUTO: NEGATIVE
LIPASE SERPL-CCNC: 32 U/L (ref 13–60)
LYMPHOCYTES # BLD AUTO: 0.38 10*3/MM3 (ref 0.7–3.1)
LYMPHOCYTES NFR BLD AUTO: 6.2 % (ref 19.6–45.3)
MCH RBC QN AUTO: 27.2 PG (ref 26.6–33)
MCHC RBC AUTO-ENTMCNC: 33.1 G/DL (ref 31.5–35.7)
MCV RBC AUTO: 82.4 FL (ref 79–97)
MONOCYTES # BLD AUTO: 0.48 10*3/MM3 (ref 0.1–0.9)
MONOCYTES NFR BLD AUTO: 7.8 % (ref 5–12)
MUCOUS THREADS URNS QL MICRO: ABNORMAL /HPF
NEUTROPHILS NFR BLD AUTO: 5.23 10*3/MM3 (ref 1.7–7)
NEUTROPHILS NFR BLD AUTO: 84.9 % (ref 42.7–76)
NITRITE UR QL STRIP: NEGATIVE
NRBC BLD AUTO-RTO: 0 /100 WBC (ref 0–0.2)
PH UR STRIP.AUTO: 6 [PH] (ref 5–8)
PLATELET # BLD AUTO: 244 10*3/MM3 (ref 140–450)
PMV BLD AUTO: 9.7 FL (ref 6–12)
POTASSIUM SERPL-SCNC: 3.6 MMOL/L (ref 3.5–5.2)
PROT SERPL-MCNC: 8 G/DL (ref 6–8.5)
PROT UR QL STRIP: ABNORMAL
RBC # BLD AUTO: 4.37 10*6/MM3 (ref 3.77–5.28)
RBC # UR STRIP: ABNORMAL /HPF
REF LAB TEST METHOD: ABNORMAL
SODIUM SERPL-SCNC: 138 MMOL/L (ref 136–145)
SP GR UR STRIP: >1.03 (ref 1–1.03)
SQUAMOUS #/AREA URNS HPF: ABNORMAL /HPF
UROBILINOGEN UR QL STRIP: ABNORMAL
WBC # UR STRIP: ABNORMAL /HPF
WBC NRBC COR # BLD: 6.16 10*3/MM3 (ref 3.4–10.8)
WHOLE BLOOD HOLD SPECIMEN: NORMAL
WHOLE BLOOD HOLD SPECIMEN: NORMAL

## 2022-01-17 PROCEDURE — 25010000002 DIPHENHYDRAMINE PER 50 MG: Performed by: NURSE PRACTITIONER

## 2022-01-17 PROCEDURE — 96374 THER/PROPH/DIAG INJ IV PUSH: CPT

## 2022-01-17 PROCEDURE — 25010000002 METOCLOPRAMIDE PER 10 MG: Performed by: NURSE PRACTITIONER

## 2022-01-17 PROCEDURE — 96361 HYDRATE IV INFUSION ADD-ON: CPT

## 2022-01-17 PROCEDURE — 81001 URINALYSIS AUTO W/SCOPE: CPT | Performed by: EMERGENCY MEDICINE

## 2022-01-17 PROCEDURE — 96375 TX/PRO/DX INJ NEW DRUG ADDON: CPT

## 2022-01-17 PROCEDURE — 25010000002 ONDANSETRON PER 1 MG: Performed by: NURSE PRACTITIONER

## 2022-01-17 RX ORDER — ONDANSETRON 4 MG/1
4 TABLET, ORALLY DISINTEGRATING ORAL 4 TIMES DAILY PRN
Qty: 20 TABLET | Refills: 0 | Status: SHIPPED | OUTPATIENT
Start: 2022-01-17 | End: 2022-07-19

## 2022-01-17 RX ORDER — METOCLOPRAMIDE HYDROCHLORIDE 5 MG/ML
10 INJECTION INTRAMUSCULAR; INTRAVENOUS ONCE
Status: COMPLETED | OUTPATIENT
Start: 2022-01-17 | End: 2022-01-17

## 2022-01-17 RX ORDER — ONDANSETRON 2 MG/ML
4 INJECTION INTRAMUSCULAR; INTRAVENOUS ONCE
Status: COMPLETED | OUTPATIENT
Start: 2022-01-17 | End: 2022-01-17

## 2022-01-17 RX ORDER — DIPHENHYDRAMINE HYDROCHLORIDE 50 MG/ML
25 INJECTION INTRAMUSCULAR; INTRAVENOUS ONCE
Status: COMPLETED | OUTPATIENT
Start: 2022-01-17 | End: 2022-01-17

## 2022-01-17 RX ORDER — DOXYLAMINE SUCCINATE AND PYRIDOXINE HYDROCHLORIDE, DELAYED RELEASE TABLETS 10 MG/10 MG 10; 10 MG/1; MG/1
2 TABLET, DELAYED RELEASE ORAL NIGHTLY PRN
Qty: 60 TABLET | Refills: 0 | Status: SHIPPED | OUTPATIENT
Start: 2022-01-17 | End: 2022-07-19

## 2022-01-17 RX ADMIN — DIPHENHYDRAMINE HYDROCHLORIDE 25 MG: 50 INJECTION, SOLUTION INTRAMUSCULAR; INTRAVENOUS at 03:37

## 2022-01-17 RX ADMIN — METOCLOPRAMIDE HYDROCHLORIDE 10 MG: 5 INJECTION INTRAMUSCULAR; INTRAVENOUS at 03:37

## 2022-01-17 RX ADMIN — ONDANSETRON 4 MG: 2 INJECTION INTRAMUSCULAR; INTRAVENOUS at 00:28

## 2022-01-17 RX ADMIN — SODIUM CHLORIDE 1000 ML: 9 INJECTION, SOLUTION INTRAVENOUS at 00:28

## 2022-01-17 NOTE — DISCHARGE INSTRUCTIONS
Clear liquids.  Advance diet as tolerated.    Take medications for treatment of vomiting.    Follow-up with your OB/GYN    Return for new or worsening symptoms

## 2022-01-17 NOTE — ED PROVIDER NOTES
Time: 05:30 EST  Arrived by: Private vehicle  Chief Complaint: Vomiting  History provided by: Patient  History is limited by: N/A    History of Present Illness:  Patient is a 22 y.o. year old female that presents to the emergency department with excessive vomiting.  Patient is 10 weeks pregnant.  G4, P1 Ab2      History provided by:  Patient  Vomiting  The primary symptoms include nausea and vomiting ( 25 times in past 2 days). Primary symptoms do not include fever, weight loss, fatigue, abdominal pain, diarrhea, hematemesis, jaundice, hematochezia, dysuria, myalgias, arthralgias or rash. The illness began 2 days ago. The problem has not changed since onset.  The illness does not include chills, constipation, tenesmus or back pain.       Similar Symptoms Previously: Had hyperemesis with 1 pregnancy before but never this bad  Recently seen: Routine OB/GYN visits with Dr. Angelo      Patient Care Team  Primary Care Provider: None    Past Medical History:     Allergies   Allergen Reactions   • Latex Unknown - Low Severity   • Adhesive Tape Rash     Past Medical History:   Diagnosis Date   • Arthritis    • Chronic back pain      Past Surgical History:   Procedure Laterality Date   • BACK SURGERY      tethered cord   • HIP SURGERY Left    • REPAIR PATELLAR TENDON Bilateral      History reviewed. No pertinent family history.    Home Medications:  Prior to Admission medications    Medication Sig Start Date End Date Taking? Authorizing Provider   cephalexin (KEFLEX) 500 MG capsule Take 1 capsule by mouth 4 (Four) Times a Day. 11/4/21   Savanna Duffy MD   Prenatal MV-Min-Fe Fum-FA-DHA (PRENATAL 1 PO) Take  by mouth.    Provider, MD Jimena        Social History:   PT  reports that she has never smoked. She has never used smokeless tobacco. She reports that she does not drink alcohol and does not use drugs.    Record Review:  I have reviewed the patient's records in [employer].     Review of Systems  Review of Systems  "  Constitutional: Negative for chills, fatigue, fever and weight loss.   HENT: Negative for congestion, ear pain and sore throat.    Eyes: Negative for pain.   Respiratory: Negative for cough, chest tightness and shortness of breath.    Cardiovascular: Negative for chest pain.   Gastrointestinal: Positive for nausea and vomiting ( 25 times in past 2 days). Negative for abdominal pain, constipation, diarrhea, hematemesis, hematochezia and jaundice.   Genitourinary: Positive for decreased urine volume ( 4 times in 2 days). Negative for dysuria, flank pain and hematuria.   Musculoskeletal: Negative for arthralgias, back pain, joint swelling and myalgias.   Skin: Negative for pallor and rash.   Neurological: Negative for seizures and headaches.   Hematological: Negative.    Psychiatric/Behavioral: Negative.    All other systems reviewed and are negative.       Physical Exam  /66 (BP Location: Left arm, Patient Position: Sitting)   Pulse 108   Temp 98.2 °F (36.8 °C) (Oral)   Resp 14   Ht 149.9 cm (59\")   Wt 44.7 kg (98 lb 8.7 oz)   LMP 10/08/2021   SpO2 100%   BMI 19.90 kg/m²     Physical Exam  Vitals and nursing note reviewed.   Constitutional:       General: She is not in acute distress.     Appearance: She is ill-appearing ( Patient has sickly appearance). She is not toxic-appearing.      Comments: Patient frail looking   HENT:      Head: Normocephalic and atraumatic.      Right Ear: Tympanic membrane, ear canal and external ear normal.      Left Ear: Tympanic membrane, ear canal and external ear normal.      Nose: Nose normal.      Mouth/Throat:      Mouth: Mucous membranes are moist.   Eyes:      General: No scleral icterus.     Conjunctiva/sclera: Conjunctivae normal.   Cardiovascular:      Rate and Rhythm: Regular rhythm. Tachycardia present.      Pulses: Normal pulses.      Heart sounds: Normal heart sounds.   Pulmonary:      Effort: Pulmonary effort is normal. No respiratory distress.      Breath " "sounds: Normal breath sounds.   Abdominal:      General: Abdomen is flat. Bowel sounds are normal.      Palpations: Abdomen is soft.      Tenderness: There is no abdominal tenderness. There is no right CVA tenderness or left CVA tenderness.   Musculoskeletal:         General: Normal range of motion.      Cervical back: Normal range of motion and neck supple.   Skin:     General: Skin is warm and dry.      Coloration: Skin is pale.   Neurological:      Mental Status: She is alert and oriented to person, place, and time.   Psychiatric:         Mood and Affect: Mood normal.         Behavior: Behavior normal.          ED Course  /66 (BP Location: Left arm, Patient Position: Sitting)   Pulse 108   Temp 98.2 °F (36.8 °C) (Oral)   Resp 14   Ht 149.9 cm (59\")   Wt 44.7 kg (98 lb 8.7 oz)   LMP 10/08/2021   SpO2 100%   BMI 19.90 kg/m²   Results for orders placed or performed during the hospital encounter of 01/16/22   Comprehensive Metabolic Panel    Specimen: Blood   Result Value Ref Range    Glucose 119 (H) 65 - 99 mg/dL    BUN 12 6 - 20 mg/dL    Creatinine 0.55 (L) 0.57 - 1.00 mg/dL    Sodium 138 136 - 145 mmol/L    Potassium 3.6 3.5 - 5.2 mmol/L    Chloride 101 98 - 107 mmol/L    CO2 21.9 (L) 22.0 - 29.0 mmol/L    Calcium 9.8 8.6 - 10.5 mg/dL    Total Protein 8.0 6.0 - 8.5 g/dL    Albumin 4.70 3.50 - 5.20 g/dL    ALT (SGPT) 9 1 - 33 U/L    AST (SGOT) 15 1 - 32 U/L    Alkaline Phosphatase 53 39 - 117 U/L    Total Bilirubin 0.6 0.0 - 1.2 mg/dL    eGFR Non African Amer 136 >60 mL/min/1.73    Globulin 3.3 gm/dL    A/G Ratio 1.4 g/dL    BUN/Creatinine Ratio 21.8 7.0 - 25.0    Anion Gap 15.1 (H) 5.0 - 15.0 mmol/L   Lipase    Specimen: Blood   Result Value Ref Range    Lipase 32 13 - 60 U/L   Urinalysis With Microscopic If Indicated (No Culture) - Urine, Clean Catch    Specimen: Urine, Clean Catch   Result Value Ref Range    Color, UA Yellow Yellow, Straw    Appearance, UA Cloudy (A) Clear    pH, UA 6.0 5.0 - 8.0 "    Specific Gravity, UA >1.030 (H) 1.005 - 1.030    Glucose, UA Negative Negative    Ketones, UA 80 mg/dL (3+) (A) Negative    Bilirubin, UA Negative Negative    Blood, UA Negative Negative    Protein, UA 30 mg/dL (1+) (A) Negative    Leuk Esterase, UA Negative Negative    Nitrite, UA Negative Negative    Urobilinogen, UA 1.0 E.U./dL 0.2 - 1.0 E.U./dL   hCG, Quantitative, Pregnancy    Specimen: Blood   Result Value Ref Range    HCG Quantitative 118,559.00 mIU/mL   CBC Auto Differential    Specimen: Blood   Result Value Ref Range    WBC 6.16 3.40 - 10.80 10*3/mm3    RBC 4.37 3.77 - 5.28 10*6/mm3    Hemoglobin 11.9 (L) 12.0 - 15.9 g/dL    Hematocrit 36.0 34.0 - 46.6 %    MCV 82.4 79.0 - 97.0 fL    MCH 27.2 26.6 - 33.0 pg    MCHC 33.1 31.5 - 35.7 g/dL    RDW 15.2 12.3 - 15.4 %    RDW-SD 45.5 37.0 - 54.0 fl    MPV 9.7 6.0 - 12.0 fL    Platelets 244 140 - 450 10*3/mm3    Neutrophil % 84.9 (H) 42.7 - 76.0 %    Lymphocyte % 6.2 (L) 19.6 - 45.3 %    Monocyte % 7.8 5.0 - 12.0 %    Eosinophil % 0.5 0.3 - 6.2 %    Basophil % 0.3 0.0 - 1.5 %    Immature Grans % 0.3 0.0 - 0.5 %    Neutrophils, Absolute 5.23 1.70 - 7.00 10*3/mm3    Lymphocytes, Absolute 0.38 (L) 0.70 - 3.10 10*3/mm3    Monocytes, Absolute 0.48 0.10 - 0.90 10*3/mm3    Eosinophils, Absolute 0.03 0.00 - 0.40 10*3/mm3    Basophils, Absolute 0.02 0.00 - 0.20 10*3/mm3    Immature Grans, Absolute 0.02 0.00 - 0.05 10*3/mm3    nRBC 0.0 0.0 - 0.2 /100 WBC   Urinalysis, Microscopic Only - Urine, Clean Catch    Specimen: Urine, Clean Catch   Result Value Ref Range    RBC, UA None Seen None Seen /HPF    WBC, UA 0-2 (A) None Seen /HPF    Bacteria, UA Trace (A) None Seen /HPF    Squamous Epithelial Cells, UA 13-20 (A) None Seen, 0-2 /HPF    Hyaline Casts, UA None Seen None Seen /LPF    Mucus, UA Moderate/2+ (A) None Seen, Trace /HPF    Methodology Manual Light Microscopy    Green Top (Gel)   Result Value Ref Range    Extra Tube Hold for add-ons.    Lavender Top   Result Value  Ref Range    Extra Tube hold for add-on    Gold Top - SST   Result Value Ref Range    Extra Tube Hold for add-ons.    Light Blue Top   Result Value Ref Range    Extra Tube hold for add-on      Medications   sodium chloride 0.9 % flush 10 mL (has no administration in time range)   sodium chloride 0.9 % bolus 1,000 mL (0 mL Intravenous Stopped 1/17/22 0337)   ondansetron (ZOFRAN) injection 4 mg (4 mg Intravenous Given 1/17/22 0028)   metoclopramide (REGLAN) injection 10 mg (10 mg Intravenous Given 1/17/22 0337)   diphenhydrAMINE (BENADRYL) injection 25 mg (25 mg Intravenous Given 1/17/22 0337)     No results found.      Medical Decision Making:                     MDM  Number of Diagnoses or Management Options  Hyperemesis gravidarum  Diagnosis management comments: I have spoken with the patient. I have explained the patient´s condition, diagnoses and treatment plan based on the information available to me at this time. I have answered the patient's questions and addressed any concerns. The patient has a good  understanding of the patient´s diagnosis, condition, and treatment plan as can be expected at this point. The vital signs have been stable. The patient´s condition is stable and appropriate for discharge from the emergency department.      The patient will pursue further outpatient evaluation with the primary care physician or other designated or consulting physician as outlined in the discharge instructions. They are agreeable to this plan of care and follow-up instructions have been explained in detail. The patient has received these instructions in written format and have expressed an understanding of the discharge instructions. The patient is aware that any significant change in condition or worsening of symptoms should prompt an immediate return to this or the closest emergency department or call to 911.  Patient       Amount and/or Complexity of Data Reviewed  Clinical lab tests: reviewed and  ordered  Tests in the medicine section of CPT®: ordered and reviewed    Risk of Complications, Morbidity, and/or Mortality  Presenting problems: low  Diagnostic procedures: low  Management options: low    Patient Progress  Patient progress: stable       Final diagnoses:   Hyperemesis gravidarum        Disposition:  ED Disposition     ED Disposition Condition Comment    Discharge Stable            Paula Brody, APRN  01/17/22 0530

## 2022-01-24 ENCOUNTER — HOSPITAL ENCOUNTER (EMERGENCY)
Facility: HOSPITAL | Age: 22
Discharge: LEFT BEFORE DISCHARGE | End: 2022-01-24

## 2022-01-24 VITALS
RESPIRATION RATE: 17 BRPM | BODY MASS INDEX: 20.4 KG/M2 | DIASTOLIC BLOOD PRESSURE: 60 MMHG | HEART RATE: 102 BPM | TEMPERATURE: 98.3 F | SYSTOLIC BLOOD PRESSURE: 111 MMHG | OXYGEN SATURATION: 100 % | WEIGHT: 101.19 LBS | HEIGHT: 59 IN

## 2022-01-24 PROCEDURE — 99211 OFF/OP EST MAY X REQ PHY/QHP: CPT

## 2022-02-01 LAB
EXTERNAL HEPATITIS B SURFACE ANTIGEN: NEGATIVE
EXTERNAL RUBELLA QUALITATIVE: NORMAL
EXTERNAL SYPHILIS RPR SCREEN: NORMAL
HIV1 P24 AG SERPL QL IA: NEGATIVE

## 2022-07-19 ENCOUNTER — HOSPITAL ENCOUNTER (OUTPATIENT)
Facility: HOSPITAL | Age: 22
Setting detail: OBSERVATION
Discharge: HOME | End: 2022-07-20
Attending: OBSTETRICS & GYNECOLOGY | Admitting: OBSTETRICS & GYNECOLOGY

## 2022-07-19 PROBLEM — E86.0 DEHYDRATION: Status: ACTIVE | Noted: 2022-07-19

## 2022-07-19 PROCEDURE — 25010000002 ONDANSETRON PER 1 MG: Performed by: OBSTETRICS & GYNECOLOGY

## 2022-07-19 PROCEDURE — 25010000002 BETAMETHASONE ACET & SOD PHOS PER 4 MG: Performed by: OBSTETRICS & GYNECOLOGY

## 2022-07-19 PROCEDURE — G0378 HOSPITAL OBSERVATION PER HR: HCPCS

## 2022-07-19 PROCEDURE — G0463 HOSPITAL OUTPT CLINIC VISIT: HCPCS

## 2022-07-19 PROCEDURE — 96374 THER/PROPH/DIAG INJ IV PUSH: CPT

## 2022-07-19 PROCEDURE — 59025 FETAL NON-STRESS TEST: CPT

## 2022-07-19 RX ORDER — VITAMIN A, VITAMIN C, VITAMIN D, VITAMIN E, THIAMINE, RIBOFLAVIN, NIACIN, VITAMIN B6, FOLIC ACID, VITAMIN B12, CALCIUM, IRON, ZINC, COPPER 4000; 120; 400; 22; 1.84; 3; 20; 10; 1; 12; 200; 27; 25; 2 [IU]/1; MG/1; [IU]/1; [IU]/1; MG/1; MG/1; MG/1; MG/1; MG/1; UG/1; MG/1; MG/1; MG/1; MG/1
1 TABLET ORAL DAILY
Status: DISCONTINUED | OUTPATIENT
Start: 2022-07-20 | End: 2022-07-20 | Stop reason: HOSPADM

## 2022-07-19 RX ORDER — ONDANSETRON 2 MG/ML
4 INJECTION INTRAMUSCULAR; INTRAVENOUS EVERY 6 HOURS PRN
Status: DISCONTINUED | OUTPATIENT
Start: 2022-07-19 | End: 2022-07-20 | Stop reason: HOSPADM

## 2022-07-19 RX ORDER — ACETAMINOPHEN 325 MG/1
650 TABLET ORAL EVERY 6 HOURS PRN
Status: DISCONTINUED | OUTPATIENT
Start: 2022-07-19 | End: 2022-07-20 | Stop reason: HOSPADM

## 2022-07-19 RX ORDER — BETAMETHASONE SODIUM PHOSPHATE AND BETAMETHASONE ACETATE 3; 3 MG/ML; MG/ML
12 INJECTION, SUSPENSION INTRA-ARTICULAR; INTRALESIONAL; INTRAMUSCULAR; SOFT TISSUE EVERY 24 HOURS
Status: DISCONTINUED | OUTPATIENT
Start: 2022-07-19 | End: 2022-07-20 | Stop reason: HOSPADM

## 2022-07-19 RX ORDER — SODIUM CHLORIDE, SODIUM LACTATE, POTASSIUM CHLORIDE, CALCIUM CHLORIDE 600; 310; 30; 20 MG/100ML; MG/100ML; MG/100ML; MG/100ML
999 INJECTION, SOLUTION INTRAVENOUS CONTINUOUS
Status: ACTIVE | OUTPATIENT
Start: 2022-07-19 | End: 2022-07-20

## 2022-07-19 RX ADMIN — ONDANSETRON 4 MG: 2 INJECTION INTRAMUSCULAR; INTRAVENOUS at 16:19

## 2022-07-19 RX ADMIN — BETAMETHASONE SODIUM PHOSPHATE AND BETAMETHASONE ACETATE 12 MG: 3; 3 INJECTION, SUSPENSION INTRA-ARTICULAR; INTRALESIONAL; INTRAMUSCULAR at 22:03

## 2022-07-19 RX ADMIN — SODIUM CHLORIDE, POTASSIUM CHLORIDE, SODIUM LACTATE AND CALCIUM CHLORIDE 999 ML: 600; 310; 30; 20 INJECTION, SOLUTION INTRAVENOUS at 16:20

## 2022-07-19 RX ADMIN — ACETAMINOPHEN 650 MG: 325 TABLET ORAL at 23:38

## 2022-07-19 NOTE — NURSING NOTE
Dr Alcantar notified of pt c/o of NVD x 1 day, pressure and cramping. SVE closed. T.O. for 1 L LR fluid bolus and 4mg zofran IVP.

## 2022-07-20 ENCOUNTER — HOSPITAL ENCOUNTER (OUTPATIENT)
Facility: HOSPITAL | Age: 22
Discharge: HOME | End: 2022-07-20
Attending: OBSTETRICS & GYNECOLOGY | Admitting: OBSTETRICS & GYNECOLOGY

## 2022-07-20 VITALS
TEMPERATURE: 98.1 F | DIASTOLIC BLOOD PRESSURE: 60 MMHG | SYSTOLIC BLOOD PRESSURE: 108 MMHG | RESPIRATION RATE: 18 BRPM | HEART RATE: 102 BPM

## 2022-07-20 PROCEDURE — 96376 TX/PRO/DX INJ SAME DRUG ADON: CPT

## 2022-07-20 PROCEDURE — G0378 HOSPITAL OBSERVATION PER HR: HCPCS

## 2022-07-20 PROCEDURE — 25010000002 BETAMETHASONE ACET & SOD PHOS PER 4 MG: Performed by: OBSTETRICS & GYNECOLOGY

## 2022-07-20 PROCEDURE — 25010000002 ONDANSETRON PER 1 MG: Performed by: OBSTETRICS & GYNECOLOGY

## 2022-07-20 PROCEDURE — G0463 HOSPITAL OUTPT CLINIC VISIT: HCPCS

## 2022-07-20 PROCEDURE — 59020 FETAL CONTRACT STRESS TEST: CPT

## 2022-07-20 PROCEDURE — 59025 FETAL NON-STRESS TEST: CPT

## 2022-07-20 PROCEDURE — 96372 THER/PROPH/DIAG INJ SC/IM: CPT

## 2022-07-20 RX ORDER — BETAMETHASONE SODIUM PHOSPHATE AND BETAMETHASONE ACETATE 3; 3 MG/ML; MG/ML
12 INJECTION, SUSPENSION INTRA-ARTICULAR; INTRALESIONAL; INTRAMUSCULAR; SOFT TISSUE EVERY 24 HOURS
Status: DISCONTINUED | OUTPATIENT
Start: 2022-07-20 | End: 2022-07-21 | Stop reason: HOSPADM

## 2022-07-20 RX ADMIN — BETAMETHASONE SODIUM PHOSPHATE AND BETAMETHASONE ACETATE 12 MG: 3; 3 INJECTION, SUSPENSION INTRA-ARTICULAR; INTRALESIONAL; INTRAMUSCULAR at 20:51

## 2022-07-20 RX ADMIN — ONDANSETRON 4 MG: 2 INJECTION INTRAMUSCULAR; INTRAVENOUS at 05:44

## 2022-07-20 NOTE — H&P
IDRIS Valdes  Obstetric History and Physical    Chief Complaint   Patient presents with   • Vomiting     N/V/D       Subjective     Patient is a 22 y.o. female  currently at 36w2d, who presents with c/o nausea/vomiting and diarrhea.  She reports that she has not been able to keep any fluids down most of the day.  Patient also reports contractions.  No fever or chills.  No dysuria.    Her prenatal care is benign.  Her previous obstetric/gynecological history is noted for is remarkable for History of prior  section.    The following portions of the patients history were reviewed and updated as appropriate.       Prenatal Information:  Prenatal Results     POC Urine Glucose/Protein     Test Value Reference Range Date Time    Urine Glucose        Urine Protein              Initial Prenatal Labs     Test Value Reference Range Date Time    Hemoglobin  11.9 g/dL 12.0 - 15.9 22 2354    Hematocrit  36.0 % 34.0 - 46.6 22 2354    Platelets  244 10*3/mm3 140 - 450 22 2354    Rubella IgG        Hepatitis B SAg        Hepatitis C Ab        RPR        ABO  B   21 1341    Rh  Positive   21 1341    Antibody Screen        HIV        Urine Culture  Streptococcus agalactiae (organism)   20 1650    Gonorrhea        Chlamydia        TSH        HgB A1c               2nd and 3rd Trimester     Test Value Reference Range Date Time    Hemoglobin (repeated)        Hematocrit (repeated)        Platelets   244 10*3/mm3 140 - 450 22 2354    GCT        Antibody Screen (repeated)        GTT Fasting        GTT 1 Hr        GTT 2 Hr        GTT 3 Hr        Group B Strep              Drug Screening     Test Value Reference Range Date Time    Amphetamine Screen        Barbiturate Screen        Benzodiazepine Screen        Methadone Screen        Phencyclidine Screen        Opiates Screen        THC Screen        Cocaine Screen        Propoxyphene Screen        Buprenorphine Screen         Methamphetamine Screen        Oxycodone Screen        Tricyclic Antidepressants Screen              Other (Risk screening)     Test Value Reference Range Date Time    Varicella IgG        Parvovirus IgG        CMV IgG        Cystic Fibrosis        Hemoglobin electrophoresis        NIPT        MSAFP-4        AFP (for NTD only)              Legend    ^: Historical                      External Prenatal Results     Pregnancy Outside Results - Transcribed From Office Records - See Scanned Records For Details     Test Value Date Time    ABO  B  21 1341    Rh  Positive  21 1341    Antibody Screen  ABSCR GEL*NEG                                    *19*19:05*MDR NA 19 1740    Varicella IgG       Rubella       Hgb  11.9 g/dL 22 2354    Hct  36.0 % 22 2354    Glucose Fasting GTT       Glucose Tolerance Test 1 hour       Glucose Tolerance Test 3 hour       Gonorrhea (discrete)       Chlamydia (discrete)       RPR       VDRL       Syphilis Antibody       HBsAg       Herpes Simplex Virus PCR       Herpes Simplex VIrus Culture       HIV       Hep C RNA Quant PCR       Hep C Antibody       AFP       Group B Strep       GBS Susceptibility to Clindamycin       GBS Susceptibility to Erythromycin       Fetal Fibronectin  Negative NA 10/21/19 1630    Genetic Testing, Maternal Blood             Drug Screening     Test Value Date Time    Urine Drug Screen       Amphetamine Screen       Barbiturate Screen       Benzodiazepine Screen       Methadone Screen       Phencyclidine Screen       Opiates Screen       THC Screen       Cocaine Screen       Propoxyphene Screen       Buprenorphine Screen       Methamphetamine Screen       Oxycodone Screen       Tricyclic Antidepressants Screen             Legend    ^: Historical                         Past OB History:     OB History    Para Term  AB Living   4 1 1 0 1 1   SAB IAB Ectopic Molar Multiple Live Births   0 0 0 0 0 1      # Outcome Date GA  Lbr Nicholas/2nd Weight Sex Delivery Anes PTL Lv   4 Current            3 AB            2 Term         VITO   1                 Past Medical History: Past Medical History:   Diagnosis Date   • Arthritis    • Chronic back pain       Past Surgical History Past Surgical History:   Procedure Laterality Date   • BACK SURGERY      tethered cord   • HIP SURGERY Left    • REPAIR PATELLAR TENDON Bilateral       Family History: History reviewed. No pertinent family history.   Social History:  reports that she has never smoked. She has never used smokeless tobacco.   reports no history of alcohol use.   reports no history of drug use.        Review of Systems: Negative except for the above      Objective     Vital Signs Range for the last 24 hours  Temperature: Temp:  [97.7 °F (36.5 °C)-99.2 °F (37.3 °C)] 99.2 °F (37.3 °C)   Temp Source: Temp src: Oral   BP: BP: (103-129)/(58-83) 109/58   Pulse: Heart Rate:  [101-117] 105   Respirations: Resp:  [16-18] 16   SPO2:     O2 Amount (l/min):     O2 Devices     Weight:       Physical Examination:   General: Alert and oriented no acute distress  Lungs: Clear to auscultation bilaterally.  Heart: Regular rate and rhythm.  Abdomen: Soft nontender gravid    Presentation:  Unknown   Cervix: Exam by:     Dilation: Cervical Dilation (cm): 0   Effacement: Cervical Effacement: 30%   Station:       Fetal Heart Rate Assessment   Method: Fetal HR Assessment Method: external   Beats/min: Fetal HR (beats/min): 155   Baseline: Fetal HR Baseline: normal range   Variability: Fetal HR Variability: moderate (amplitude range 6 to 25 bpm)   Accels: Fetal HR Accelerations: greater than/equal to 15 bpm, lasting at least 15 seconds   Decels: Fetal HR Decelerations: absent   Tracing Category: Fetal HR Tracing Category: Category I     Uterine Assessment   Method: Method: external tocotransducer, palpation   Frequency (min): Contraction Frequency (Minutes): occasional   Ctx Count in 10 min: Contractions in 10  Minutes: 3   Duration:     Intensity: Contraction Intensity: mild by palpation   Intensity by IUPC:     Resting Tone: Uterine Resting Tone: soft by palpation   Resting Tone by IUPC:     [place] Units:         Assessment & Plan       Dehydration      Assessment & Plan    Assessment:  1.  Intrauterine pregnancy at 36w2d gestation with reactive fetal status.  Patient is dehydrated due to nausea vomiting and diarrhea.  She is having  contractions but at this time no cervical change.    Plan:  We will admit for 23 hours observation.  We will continue IV fluids.  Fetal heart tracing has been reassuring and will stop continuous monitoring.  We will recheck cervix in the morning and if there is any major cervical change then we will consider doing a repeat  section.  Patient has received 1 dose of Celestone.      Rudy Alcantar MD  2022  23:44 EDT

## 2022-07-20 NOTE — NURSING NOTE
Dr Alcantar at nurses station informed that SVE unchanged but pt still complaining of discomfort. Orders received to give an additional liter of NS and betamethasone.

## 2022-07-20 NOTE — NURSING NOTE
Dr Downing contacted with pt update. Orders received to move to a room and continue to monitor over night.

## 2022-07-20 NOTE — NURSING NOTE
Dr. Alcantar at bedside assessing patient. MD ok to take patient off monitor to allow rest. Continue IV fluids through night. Will reassess in am 7/20/22

## 2022-07-20 NOTE — DISCHARGE SUMMARY
Admission date: 2022    Admission diagnosis: IUP at 36 weeks and 3 days with nausea vomiting diarrhea and dehydration.   uterine contractions.    Admission physician: Rudy Alcantar    Discharge date: 2022    Discharge diagnosis: Same    Discharge condition: Stable    Discharge physician: [facility] course and discharge exam:  Patient is a 22-year-old  4 para 1 female at 36 weeks and 3 days estimate gestational age who presents labor and delivery with complaint of nausea vomiting and diarrhea.  She was noted to be dehydrated.  Nonstress test showed patient to have regular contractions.  Cervix was closed.  Patient received IV fluids in the triage area but continued to have contractions.  She has a history of prior  section.  Decision at that point was made to admit the patient for observation and continue IV fluid hydration.  Patient reports she feels better but still having some contractions.    At the time of exam:  She is afebrile vital signs stable.  Lungs clear to auscultation bilaterally.  Heart is regular rate and rhythm.  Abdomen soft nontender gravid  Cervix: Closed/long    Patient will be discharged home this morning.  She will increase her p.o. fluids.  She is to return for worsening contractions or vaginal bleeding.  She will follow-up in the office on .

## 2022-07-21 ENCOUNTER — APPOINTMENT (OUTPATIENT)
Dept: LABOR AND DELIVERY | Facility: HOSPITAL | Age: 22
End: 2022-07-21

## 2022-07-21 ENCOUNTER — HOSPITAL ENCOUNTER (OUTPATIENT)
Facility: HOSPITAL | Age: 22
Discharge: HOME | End: 2022-07-22
Attending: OBSTETRICS & GYNECOLOGY | Admitting: OBSTETRICS & GYNECOLOGY

## 2022-07-21 VITALS
SYSTOLIC BLOOD PRESSURE: 108 MMHG | BODY MASS INDEX: 24.19 KG/M2 | RESPIRATION RATE: 16 BRPM | TEMPERATURE: 98.6 F | HEART RATE: 85 BPM | WEIGHT: 120 LBS | DIASTOLIC BLOOD PRESSURE: 56 MMHG | HEIGHT: 59 IN

## 2022-07-21 PROBLEM — O36.8130 DECREASED FETAL MOVEMENTS IN THIRD TRIMESTER: Status: ACTIVE | Noted: 2022-07-21

## 2022-07-21 PROBLEM — O47.03 THREATENED PRETERM LABOR, THIRD TRIMESTER: Status: ACTIVE | Noted: 2022-07-21

## 2022-07-21 LAB
ABO GROUP BLD: NORMAL
ANISOCYTOSIS BLD QL: NORMAL
BASOPHILS # BLD AUTO: 0 10*3/MM3 (ref 0–0.2)
BASOPHILS NFR BLD AUTO: 0 % (ref 0–1.5)
BLD GP AB SCN SERPL QL: NEGATIVE
DACRYOCYTES BLD QL SMEAR: NORMAL
DEPRECATED RDW RBC AUTO: 42.7 FL (ref 37–54)
EOSINOPHIL # BLD AUTO: 0 10*3/MM3 (ref 0–0.4)
EOSINOPHIL NFR BLD AUTO: 0 % (ref 0.3–6.2)
ERYTHROCYTE [DISTWIDTH] IN BLOOD BY AUTOMATED COUNT: 15.9 % (ref 12.3–15.4)
HCT VFR BLD AUTO: 23 % (ref 34–46.6)
HGB BLD-MCNC: 7 G/DL (ref 12–15.9)
HYPOCHROMIA BLD QL: NORMAL
IMM GRANULOCYTES # BLD AUTO: 0.14 10*3/MM3 (ref 0–0.05)
IMM GRANULOCYTES NFR BLD AUTO: 1.7 % (ref 0–0.5)
LYMPHOCYTES # BLD AUTO: 0.78 10*3/MM3 (ref 0.7–3.1)
LYMPHOCYTES NFR BLD AUTO: 9.7 % (ref 19.6–45.3)
MCH RBC QN AUTO: 22.5 PG (ref 26.6–33)
MCHC RBC AUTO-ENTMCNC: 30.4 G/DL (ref 31.5–35.7)
MCV RBC AUTO: 74 FL (ref 79–97)
MICROCYTES BLD QL: NORMAL
MONOCYTES # BLD AUTO: 0.83 10*3/MM3 (ref 0.1–0.9)
MONOCYTES NFR BLD AUTO: 10.3 % (ref 5–12)
NEUTROPHILS NFR BLD AUTO: 6.27 10*3/MM3 (ref 1.7–7)
NEUTROPHILS NFR BLD AUTO: 78.3 % (ref 42.7–76)
NRBC BLD AUTO-RTO: 0 /100 WBC (ref 0–0.2)
OVALOCYTES BLD QL SMEAR: NORMAL
PLAT MORPH BLD: NORMAL
PLATELET # BLD AUTO: 190 10*3/MM3 (ref 140–450)
PMV BLD AUTO: 10 FL (ref 6–12)
RBC # BLD AUTO: 3.11 10*6/MM3 (ref 3.77–5.28)
RH BLD: POSITIVE
T&S EXPIRATION DATE: NORMAL
WBC MORPH BLD: NORMAL
WBC NRBC COR # BLD: 8.02 10*3/MM3 (ref 3.4–10.8)

## 2022-07-21 PROCEDURE — 86850 RBC ANTIBODY SCREEN: CPT | Performed by: OBSTETRICS & GYNECOLOGY

## 2022-07-21 PROCEDURE — 85025 COMPLETE CBC W/AUTO DIFF WBC: CPT | Performed by: OBSTETRICS & GYNECOLOGY

## 2022-07-21 PROCEDURE — G0463 HOSPITAL OUTPT CLINIC VISIT: HCPCS

## 2022-07-21 PROCEDURE — 86900 BLOOD TYPING SEROLOGIC ABO: CPT | Performed by: OBSTETRICS & GYNECOLOGY

## 2022-07-21 PROCEDURE — 86901 BLOOD TYPING SEROLOGIC RH(D): CPT | Performed by: OBSTETRICS & GYNECOLOGY

## 2022-07-21 PROCEDURE — 85007 BL SMEAR W/DIFF WBC COUNT: CPT | Performed by: OBSTETRICS & GYNECOLOGY

## 2022-07-21 PROCEDURE — 59025 FETAL NON-STRESS TEST: CPT

## 2022-07-21 RX ORDER — SODIUM CHLORIDE 0.9 % (FLUSH) 0.9 %
10 SYRINGE (ML) INJECTION EVERY 12 HOURS SCHEDULED
Status: DISCONTINUED | OUTPATIENT
Start: 2022-07-21 | End: 2022-07-22 | Stop reason: HOSPADM

## 2022-07-21 RX ORDER — DEXTROSE, SODIUM CHLORIDE, SODIUM LACTATE, POTASSIUM CHLORIDE, AND CALCIUM CHLORIDE 5; .6; .31; .03; .02 G/100ML; G/100ML; G/100ML; G/100ML; G/100ML
200 INJECTION, SOLUTION INTRAVENOUS CONTINUOUS
Status: DISCONTINUED | OUTPATIENT
Start: 2022-07-21 | End: 2022-07-22 | Stop reason: HOSPADM

## 2022-07-21 RX ORDER — SODIUM CHLORIDE 0.9 % (FLUSH) 0.9 %
10 SYRINGE (ML) INJECTION AS NEEDED
Status: DISCONTINUED | OUTPATIENT
Start: 2022-07-21 | End: 2022-07-22 | Stop reason: HOSPADM

## 2022-07-21 RX ADMIN — SODIUM CHLORIDE, SODIUM LACTATE, POTASSIUM CHLORIDE, CALCIUM CHLORIDE AND DEXTROSE MONOHYDRATE 200 ML/HR: 5; 600; 310; 30; 20 INJECTION, SOLUTION INTRAVENOUS at 15:44

## 2022-07-21 RX ADMIN — SODIUM CHLORIDE, POTASSIUM CHLORIDE, SODIUM LACTATE AND CALCIUM CHLORIDE 500 ML: 600; 310; 30; 20 INJECTION, SOLUTION INTRAVENOUS at 14:50

## 2022-07-21 NOTE — NURSING NOTE
Dr. Norris notified of Dr. Alcantar's patient who arrived to triage with complaint of decreased fetal movement and contractions. Patient  at 36 4/7 weeks gestation. Patient has had a prior . 2 days ago patient was admitted overnight for nausea, vomiting, and diarrhea. Patient reports she is still having diarrhea. NST reactive. Patient is having contractions with irritability noted. Cervix is closed and was closed when she was here 2 days ago. Patient did receive 2 doses of betamethasone. One on Tuesday and the other yesterday. Patient afebrile. BP wdl. Notified of urine dip results of trace ketones and specific gravity greater than 1.030. Per Dr. Norris give 500ml bolus of LR, then run d5lr at 200ml/hour until ketones start to clear.

## 2022-07-22 PROCEDURE — G0463 HOSPITAL OUTPT CLINIC VISIT: HCPCS

## 2022-07-22 PROCEDURE — 59020 FETAL CONTRACT STRESS TEST: CPT

## 2022-07-22 NOTE — NURSING NOTE
Patient given orange juice at this time.  States she's hoping her primary OB comes in this morning and makes a decision about her care.  No distress noted.

## 2022-07-22 NOTE — NURSING NOTE
Dr. Farr at bedside talking with pt. Vag exam performed no change. Verbal discharge orders received.

## 2022-07-22 NOTE — H&P
IDRIS Valdes  Obstetric History and Physical    Chief Complaint   Patient presents with   • Contractions   • Decreased Fetal Movement       Subjective     Patient is a 22 y.o. female  currently at 36w4d, who presents with -0-1-1 at 36 weeks and 4 days gestation with decreased fetal movement and pelvic pain.    Her prenatal care is complicated by   labo threatened.  Not taking in oral hydration or nutrition has received a course of betamethasone.  Her previous obstetric/gynecological history is noted for is remarkable for  and Term vaginal delivery.    The following portions of the patients history were reviewed and updated as appropriate: past medical history, past surgical history, past family history and problem list .       Prenatal Information:  Prenatal Results     POC Urine Glucose/Protein     Test Value Reference Range Date Time    Urine Glucose        Urine Protein              Initial Prenatal Labs     Test Value Reference Range Date Time    Hemoglobin  11.9 g/dL 12.0 - 15.9 22 2354    Hematocrit  36.0 % 34.0 - 46.6 22 2354    Platelets  244 10*3/mm3 140 - 450 22 2354    Rubella IgG        Hepatitis B SAg        Hepatitis C Ab        RPR        ABO  B   21 1341    Rh  Positive   21 1341    Antibody Screen        HIV        Urine Culture  Streptococcus agalactiae (organism)   20 1650    Gonorrhea        Chlamydia        TSH        HgB A1c               2nd and 3rd Trimester     Test Value Reference Range Date Time    Hemoglobin (repeated)        Hematocrit (repeated)        Platelets   244 10*3/mm3 140 - 450 22 2354    GCT        Antibody Screen (repeated)        GTT Fasting        GTT 1 Hr        GTT 2 Hr        GTT 3 Hr        Group B Strep              Drug Screening     Test Value Reference Range Date Time    Amphetamine Screen        Barbiturate Screen        Benzodiazepine Screen        Methadone Screen        Phencyclidine Screen        Opiates  Screen        THC Screen        Cocaine Screen        Propoxyphene Screen        Buprenorphine Screen        Methamphetamine Screen        Oxycodone Screen        Tricyclic Antidepressants Screen              Other (Risk screening)     Test Value Reference Range Date Time    Varicella IgG        Parvovirus IgG        CMV IgG        Cystic Fibrosis        Hemoglobin electrophoresis        NIPT        MSAFP-4        AFP (for NTD only)              Legend    ^: Historical                      External Prenatal Results     Pregnancy Outside Results - Transcribed From Office Records - See Scanned Records For Details     Test Value Date Time    ABO  B  21 1341    Rh  Positive  21 1341    Antibody Screen  ABSCR GEL*NEG                                    *19*19:05*MDR NA 19 1740    Varicella IgG       Rubella       Hgb  11.9 g/dL 22 2354    Hct  36.0 % 22 2354    Glucose Fasting GTT       Glucose Tolerance Test 1 hour       Glucose Tolerance Test 3 hour       Gonorrhea (discrete)       Chlamydia (discrete)       RPR       VDRL       Syphilis Antibody       HBsAg       Herpes Simplex Virus PCR       Herpes Simplex VIrus Culture       HIV       Hep C RNA Quant PCR       Hep C Antibody       AFP       Group B Strep       GBS Susceptibility to Clindamycin       GBS Susceptibility to Erythromycin       Fetal Fibronectin  Negative NA 10/21/19 1630    Genetic Testing, Maternal Blood             Drug Screening     Test Value Date Time    Urine Drug Screen       Amphetamine Screen       Barbiturate Screen       Benzodiazepine Screen       Methadone Screen       Phencyclidine Screen       Opiates Screen       THC Screen       Cocaine Screen       Propoxyphene Screen       Buprenorphine Screen       Methamphetamine Screen       Oxycodone Screen       Tricyclic Antidepressants Screen             Legend    ^: Historical                         Past OB History:     OB History    Para Term   AB Living   4 1 1 0 1 1   SAB IAB Ectopic Molar Multiple Live Births   0 0 0 0 0 1      # Outcome Date GA Lbr Nicholas/2nd Weight Sex Delivery Anes PTL Lv   4 Current            3 AB            2 Term         VITO   1                 Past Medical History: Past Medical History:   Diagnosis Date   • Arthritis    • Chronic back pain       Past Surgical History Past Surgical History:   Procedure Laterality Date   • BACK SURGERY      tethered cord   • HIP SURGERY Left    • REPAIR PATELLAR TENDON Bilateral       Family History: No family history on file.   Social History:  reports that she has never smoked. She has never used smokeless tobacco.   reports no history of alcohol use.   reports no history of drug use.        Review of Systems   Constitutional: Positive for appetite change. Negative for chills and fever.   Eyes: Negative.    Respiratory: Negative.    Cardiovascular: Negative.    Gastrointestinal: Positive for diarrhea.   Genitourinary: Positive for decreased urine volume and pelvic pain.   Musculoskeletal: Negative.    Skin: Negative.    Neurological: Positive for headaches.   Hematological: Negative.    Psychiatric/Behavioral: Positive for dysphoric mood.   All other systems reviewed and are negative.        Objective     Vital Signs Range for the last 24 hours  Temperature: Temp:  [98.6 °F (37 °C)] 98.6 °F (37 °C)   Temp Source: Temp src: Oral   BP: BP: (108)/(56) 108/56   Pulse: Heart Rate:  [85] 85   Respirations: Resp:  [16] 16   SPO2:     O2 Amount (l/min):     O2 Devices     Weight: Weight:  [54.4 kg (120 lb)] 54.4 kg (120 lb)     Physical Examination: General appearance - alert, well appearing, and in no distress  Mental status - alert, oriented to person, place, and time  Eyes - sclera anicteric  Mouth - Mucous membranes are dry  Neck - supple, no significant adenopathy  Chest - clear to auscultation, no wheezes, rales or rhonchi, symmetric air entry  Heart - normal rate, regular rhythm,  normal S1, S2, no murmurs, rubs, clicks or gallops  Abdomen - bowel sounds normal  No rebound or guarding; irregular contractions palpate soft  Neurological - alert, oriented, normal speech, no focal findings or movement disorder noted  Extremities - no pedal edema noted  Skin - normal coloration and turgor, no rashes, no suspicious skin lesions noted    Presentation:  Not assessed   Cervix: Exam by:     Dilation: Cervical Dilation (cm): 0   Effacement: Cervical Effacement: 50%   Station:       Fetal Heart Rate Assessment   Method: Fetal HR Assessment Method: external   Beats/min: Fetal HR (beats/min): 135   Baseline: Fetal HR Baseline: normal range   Variability: Fetal HR Variability: moderate (amplitude range 6 to 25 bpm)   Accels: Fetal HR Accelerations: greater than/equal to 15 bpm, lasting at least 15 seconds   Decels: Fetal HR Decelerations: absent   Tracing Category: Fetal HR Tracing Category: Category I     Uterine Assessment   Method: Method: external tocotransducer   Frequency (min): Contraction Frequency (Minutes): 2-3   Ctx Count in 10 min:     Duration:     Intensity: Contraction Intensity: mild by palpation   Intensity by IUPC:     Resting Tone: Uterine Resting Tone: soft by palpation   Resting Tone by IUPC:     [place] Units:       Laboratory Results: Reviewed  Radiology Review: Not applicable  Other Studies: Nonstress test    Assessment & Plan       Decreased fetal movements in third trimester    Threatened  labor, third trimester    Intractable chronic migraine without aura and without status migrainosus      Assessment & Plan    Assessment:  1.  Intrauterine pregnancy at 36w4d gestation with reactive, reassuring fetal status.    2.  Dehydration.  Patient with lack of appetite with no desired to have oral intake.  She states that protein drinks would make her sick.  She states that nausea medicine will not help it only diminishes her appetite further.  So therefore she is simply not  eating.  She was here a couple days ago with very similar symptoms.  There are no several people in the household.  She has been here for many hours without any episodes of diarrhea.  However she has not voided since early this morning.  She will have periodic contractions and then none for a long period the cervix has not changed during this period of observation.  3.  Obstetrical history significant for is remarkable for .  4.  GBS status: No results found for: STREPGPB    Plan:  1.  Continue IV hydration.  Continue monitoring.  Observe overnight.  2. Plan of care has been reviewed with patient and support person at bedside  3.  Risks, benefits of treatment plan have been discussed.  4.  All questions have been answered.  5.  Will use Vistaril if patient desires something for nausea      Amie Norris MD  7/21/2022  20:30 EDT

## 2022-07-22 NOTE — NURSING NOTE
Dr Norris at bedside discussing symptoms with patient; stated she will keep patient overnight for Dr Alcantar to evaluate in am- patient stated she has not eaten today or yesterday; When Dr Norris left the bedside the patient asked why we weren't listening to her and did not understand her body was ready to have the baby; I explained before 39 weeks we could not deliver her without a medical reason; patient also became upset when she realized she would be in triage tonight.  She stated she wanted to go home, then stated she would stay since she was 'starving' her baby; patient also stated she would be coming in every day until we delivered her baby.

## 2022-07-22 NOTE — NON STRESS TEST
Obstetrical Non-stress Test Interpretation     Name:  Riri Martinez  MRN: [id number]    22 y.o. female  at 36w4d    Indication: Decreased fetal movement; pelvic pain evaluate for labor; dehydration      Baseline: Normal 110-160 bpm  Variability:   Moderate/Normal (amplitude 6-25 bpm)  Accelerations: Present (32 weeks+) 15 x 15 bpm  Decelerations: Absent   Contractions:  Occasional      Impression:  Category I       Amie Norris MD  2022  21:05 EDT

## 2022-08-03 ENCOUNTER — ANESTHESIA EVENT (OUTPATIENT)
Dept: LABOR AND DELIVERY | Facility: HOSPITAL | Age: 22
End: 2022-08-03

## 2022-08-03 ENCOUNTER — HOSPITAL ENCOUNTER (INPATIENT)
Facility: HOSPITAL | Age: 22
LOS: 2 days | Discharge: HOME | End: 2022-08-05
Attending: OBSTETRICS & GYNECOLOGY | Admitting: OBSTETRICS & GYNECOLOGY

## 2022-08-03 ENCOUNTER — ANESTHESIA (OUTPATIENT)
Dept: LABOR AND DELIVERY | Facility: HOSPITAL | Age: 22
End: 2022-08-03

## 2022-08-03 DIAGNOSIS — Z98.891 PREVIOUS CESAREAN SECTION: Primary | ICD-10-CM

## 2022-08-03 PROBLEM — O47.00 PRETERM UTERINE CONTRACTIONS, ANTEPARTUM: Status: ACTIVE | Noted: 2022-08-03

## 2022-08-03 LAB
ABO GROUP BLD: NORMAL
BASE EXCESS BLDCOA CALC-SCNC: -1.2 MMOL/L (ref -2–2)
BASE EXCESS BLDCOV CALC-SCNC: -2.3 MMOL/L (ref -2–2)
BLD GP AB SCN SERPL QL: NEGATIVE
DEPRECATED RDW RBC AUTO: 41.8 FL (ref 37–54)
ERYTHROCYTE [DISTWIDTH] IN BLOOD BY AUTOMATED COUNT: 16.3 % (ref 12.3–15.4)
HCO3 BLDCOA-SCNC: 25.6 MMOL/L
HCO3 BLDCOV-SCNC: 22.4 MMOL/L
HCT VFR BLD AUTO: 24.1 % (ref 34–46.6)
HGB BLD-MCNC: 7.3 G/DL (ref 12–15.9)
MCH RBC QN AUTO: 21.5 PG (ref 26.6–33)
MCHC RBC AUTO-ENTMCNC: 30.3 G/DL (ref 31.5–35.7)
MCV RBC AUTO: 71.1 FL (ref 79–97)
PCO2 BLDCOA: 50.8 MMHG (ref 33–49)
PCO2 BLDCOV: 38.4 MM HG (ref 28–40)
PH BLDCOA: 7.32 PH UNITS (ref 7.21–7.31)
PH BLDCOV: 7.38 PH UNITS (ref 7.31–7.37)
PLATELET # BLD AUTO: 190 10*3/MM3 (ref 140–450)
PMV BLD AUTO: 9.9 FL (ref 6–12)
PO2 BLDCOA: <40.5 MMHG
PO2 BLDCOV: <40.5 MM HG (ref 21–31)
RBC # BLD AUTO: 3.39 10*6/MM3 (ref 3.77–5.28)
RH BLD: POSITIVE
SARS-COV-2 RNA PNL SPEC NAA+PROBE: NOT DETECTED
T&S EXPIRATION DATE: NORMAL
WBC NRBC COR # BLD: 7.78 10*3/MM3 (ref 3.4–10.8)

## 2022-08-03 PROCEDURE — 82803 BLOOD GASES ANY COMBINATION: CPT | Performed by: OBSTETRICS & GYNECOLOGY

## 2022-08-03 PROCEDURE — 25010000002 MIDAZOLAM PER 1 MG: Performed by: NURSE ANESTHETIST, CERTIFIED REGISTERED

## 2022-08-03 PROCEDURE — 25010000002 KETOROLAC TROMETHAMINE PER 15 MG: Performed by: OBSTETRICS & GYNECOLOGY

## 2022-08-03 PROCEDURE — 51702 INSERT TEMP BLADDER CATH: CPT

## 2022-08-03 PROCEDURE — 25010000002 CEFAZOLIN IN DEXTROSE 2-4 GM/100ML-% SOLUTION: Performed by: OBSTETRICS & GYNECOLOGY

## 2022-08-03 PROCEDURE — 86901 BLOOD TYPING SEROLOGIC RH(D): CPT | Performed by: OBSTETRICS & GYNECOLOGY

## 2022-08-03 PROCEDURE — 25010000002 METOCLOPRAMIDE PER 10 MG: Performed by: NURSE ANESTHETIST, CERTIFIED REGISTERED

## 2022-08-03 PROCEDURE — 0 MORPHINE PER 10 MG: Performed by: ANESTHESIOLOGY

## 2022-08-03 PROCEDURE — G0463 HOSPITAL OUTPT CLINIC VISIT: HCPCS

## 2022-08-03 PROCEDURE — U0004 COV-19 TEST NON-CDC HGH THRU: HCPCS | Performed by: OBSTETRICS & GYNECOLOGY

## 2022-08-03 PROCEDURE — 86850 RBC ANTIBODY SCREEN: CPT | Performed by: OBSTETRICS & GYNECOLOGY

## 2022-08-03 PROCEDURE — 85027 COMPLETE CBC AUTOMATED: CPT | Performed by: OBSTETRICS & GYNECOLOGY

## 2022-08-03 PROCEDURE — 86900 BLOOD TYPING SEROLOGIC ABO: CPT | Performed by: OBSTETRICS & GYNECOLOGY

## 2022-08-03 PROCEDURE — 25010000002 ONDANSETRON PER 1 MG: Performed by: NURSE ANESTHETIST, CERTIFIED REGISTERED

## 2022-08-03 PROCEDURE — 59025 FETAL NON-STRESS TEST: CPT

## 2022-08-03 PROCEDURE — 25010000002 METHYLERGONOVINE MALEATE PER 0.2 MG: Performed by: OBSTETRICS & GYNECOLOGY

## 2022-08-03 RX ORDER — EPHEDRINE SULFATE 50 MG/ML
INJECTION, SOLUTION INTRAVENOUS AS NEEDED
Status: DISCONTINUED | OUTPATIENT
Start: 2022-08-03 | End: 2022-08-03 | Stop reason: SURG

## 2022-08-03 RX ORDER — LIDOCAINE HYDROCHLORIDE 10 MG/ML
5 INJECTION, SOLUTION EPIDURAL; INFILTRATION; INTRACAUDAL; PERINEURAL AS NEEDED
Status: DISCONTINUED | OUTPATIENT
Start: 2022-08-03 | End: 2022-08-03

## 2022-08-03 RX ORDER — HYDROCORTISONE 25 MG/G
CREAM TOPICAL 3 TIMES DAILY PRN
Status: DISCONTINUED | OUTPATIENT
Start: 2022-08-03 | End: 2022-08-05 | Stop reason: HOSPADM

## 2022-08-03 RX ORDER — ONDANSETRON 2 MG/ML
INJECTION INTRAMUSCULAR; INTRAVENOUS AS NEEDED
Status: DISCONTINUED | OUTPATIENT
Start: 2022-08-03 | End: 2022-08-03 | Stop reason: SURG

## 2022-08-03 RX ORDER — OXYCODONE HYDROCHLORIDE 5 MG/1
10 TABLET ORAL EVERY 4 HOURS PRN
Status: DISCONTINUED | OUTPATIENT
Start: 2022-08-03 | End: 2022-08-05 | Stop reason: HOSPADM

## 2022-08-03 RX ORDER — SODIUM CHLORIDE 0.9 % (FLUSH) 0.9 %
10 SYRINGE (ML) INJECTION AS NEEDED
Status: DISCONTINUED | OUTPATIENT
Start: 2022-08-03 | End: 2022-08-05 | Stop reason: HOSPADM

## 2022-08-03 RX ORDER — IBUPROFEN 600 MG/1
600 TABLET ORAL EVERY 6 HOURS
Status: DISCONTINUED | OUTPATIENT
Start: 2022-08-04 | End: 2022-08-05 | Stop reason: HOSPADM

## 2022-08-03 RX ORDER — ACETAMINOPHEN 500 MG
1000 TABLET ORAL ONCE
Status: COMPLETED | OUTPATIENT
Start: 2022-08-03 | End: 2022-08-03

## 2022-08-03 RX ORDER — DIPHENHYDRAMINE HCL 25 MG
25 CAPSULE ORAL EVERY 6 HOURS PRN
Status: ACTIVE | OUTPATIENT
Start: 2022-08-03 | End: 2022-08-04

## 2022-08-03 RX ORDER — MORPHINE SULFATE 2 MG/ML
2 INJECTION, SOLUTION INTRAMUSCULAR; INTRAVENOUS EVERY 4 HOURS PRN
Status: ACTIVE | OUTPATIENT
Start: 2022-08-03 | End: 2022-08-04

## 2022-08-03 RX ORDER — KETOROLAC TROMETHAMINE 30 MG/ML
30 INJECTION, SOLUTION INTRAMUSCULAR; INTRAVENOUS ONCE
Status: COMPLETED | OUTPATIENT
Start: 2022-08-03 | End: 2022-08-03

## 2022-08-03 RX ORDER — ONDANSETRON 4 MG/1
4 TABLET, FILM COATED ORAL EVERY 6 HOURS PRN
Status: DISCONTINUED | OUTPATIENT
Start: 2022-08-03 | End: 2022-08-05 | Stop reason: HOSPADM

## 2022-08-03 RX ORDER — KETAMINE HCL IN NACL, ISO-OSM 100MG/10ML
SYRINGE (ML) INJECTION AS NEEDED
Status: DISCONTINUED | OUTPATIENT
Start: 2022-08-03 | End: 2022-08-03 | Stop reason: SURG

## 2022-08-03 RX ORDER — MORPHINE SULFATE 0.5 MG/ML
INJECTION, SOLUTION EPIDURAL; INTRATHECAL; INTRAVENOUS
Status: COMPLETED | OUTPATIENT
Start: 2022-08-03 | End: 2022-08-03

## 2022-08-03 RX ORDER — SODIUM CHLORIDE 0.9 % (FLUSH) 0.9 %
10 SYRINGE (ML) INJECTION EVERY 12 HOURS SCHEDULED
Status: DISCONTINUED | OUTPATIENT
Start: 2022-08-03 | End: 2022-08-05 | Stop reason: HOSPADM

## 2022-08-03 RX ORDER — SODIUM CHLORIDE, SODIUM LACTATE, POTASSIUM CHLORIDE, CALCIUM CHLORIDE 600; 310; 30; 20 MG/100ML; MG/100ML; MG/100ML; MG/100ML
100 INJECTION, SOLUTION INTRAVENOUS CONTINUOUS
Status: DISCONTINUED | OUTPATIENT
Start: 2022-08-03 | End: 2022-08-04

## 2022-08-03 RX ORDER — ACETAMINOPHEN 325 MG/1
650 TABLET ORAL EVERY 6 HOURS
Status: DISCONTINUED | OUTPATIENT
Start: 2022-08-04 | End: 2022-08-05 | Stop reason: HOSPADM

## 2022-08-03 RX ORDER — METHYLERGONOVINE MALEATE 0.2 MG/ML
INJECTION INTRAVENOUS AS NEEDED
Status: DISCONTINUED | OUTPATIENT
Start: 2022-08-03 | End: 2022-08-05 | Stop reason: HOSPADM

## 2022-08-03 RX ORDER — MIDAZOLAM HYDROCHLORIDE 1 MG/ML
INJECTION INTRAMUSCULAR; INTRAVENOUS AS NEEDED
Status: DISCONTINUED | OUTPATIENT
Start: 2022-08-03 | End: 2022-08-03 | Stop reason: SURG

## 2022-08-03 RX ORDER — OXYTOCIN 10 [USP'U]/ML
INJECTION, SOLUTION INTRAMUSCULAR; INTRAVENOUS AS NEEDED
Status: DISCONTINUED | OUTPATIENT
Start: 2022-08-03 | End: 2022-08-03 | Stop reason: SURG

## 2022-08-03 RX ORDER — ACETAMINOPHEN 500 MG
1000 TABLET ORAL EVERY 6 HOURS
Status: DISPENSED | OUTPATIENT
Start: 2022-08-03 | End: 2022-08-04

## 2022-08-03 RX ORDER — TRANEXAMIC ACID 10 MG/ML
1000 INJECTION, SOLUTION INTRAVENOUS ONCE AS NEEDED
Status: DISCONTINUED | OUTPATIENT
Start: 2022-08-03 | End: 2022-08-05 | Stop reason: HOSPADM

## 2022-08-03 RX ORDER — METOCLOPRAMIDE HYDROCHLORIDE 5 MG/ML
INJECTION INTRAMUSCULAR; INTRAVENOUS AS NEEDED
Status: DISCONTINUED | OUTPATIENT
Start: 2022-08-03 | End: 2022-08-03 | Stop reason: SURG

## 2022-08-03 RX ORDER — CEFAZOLIN SODIUM 2 G/100ML
2 INJECTION, SOLUTION INTRAVENOUS ONCE
Status: COMPLETED | OUTPATIENT
Start: 2022-08-03 | End: 2022-08-03

## 2022-08-03 RX ORDER — SODIUM CHLORIDE, SODIUM LACTATE, POTASSIUM CHLORIDE, CALCIUM CHLORIDE 600; 310; 30; 20 MG/100ML; MG/100ML; MG/100ML; MG/100ML
150 INJECTION, SOLUTION INTRAVENOUS CONTINUOUS
Status: DISCONTINUED | OUTPATIENT
Start: 2022-08-03 | End: 2022-08-04

## 2022-08-03 RX ORDER — OXYCODONE HYDROCHLORIDE 5 MG/1
5 TABLET ORAL EVERY 4 HOURS PRN
Status: DISCONTINUED | OUTPATIENT
Start: 2022-08-03 | End: 2022-08-05 | Stop reason: HOSPADM

## 2022-08-03 RX ORDER — SODIUM CHLORIDE 0.9 % (FLUSH) 0.9 %
10 SYRINGE (ML) INJECTION AS NEEDED
Status: DISCONTINUED | OUTPATIENT
Start: 2022-08-03 | End: 2022-08-03 | Stop reason: HOSPADM

## 2022-08-03 RX ORDER — ALUMINA, MAGNESIA, AND SIMETHICONE 2400; 2400; 240 MG/30ML; MG/30ML; MG/30ML
15 SUSPENSION ORAL EVERY 4 HOURS PRN
Status: DISCONTINUED | OUTPATIENT
Start: 2022-08-03 | End: 2022-08-05 | Stop reason: HOSPADM

## 2022-08-03 RX ORDER — MISOPROSTOL 200 UG/1
TABLET ORAL AS NEEDED
Status: DISCONTINUED | OUTPATIENT
Start: 2022-08-03 | End: 2022-08-05 | Stop reason: HOSPADM

## 2022-08-03 RX ORDER — CARBOPROST TROMETHAMINE 250 UG/ML
INJECTION, SOLUTION INTRAMUSCULAR AS NEEDED
Status: DISCONTINUED | OUTPATIENT
Start: 2022-08-03 | End: 2022-08-05 | Stop reason: HOSPADM

## 2022-08-03 RX ORDER — SODIUM CHLORIDE 0.9 % (FLUSH) 0.9 %
10 SYRINGE (ML) INJECTION AS NEEDED
Status: DISCONTINUED | OUTPATIENT
Start: 2022-08-03 | End: 2022-08-03

## 2022-08-03 RX ORDER — MISOPROSTOL 200 UG/1
200 TABLET ORAL EVERY 6 HOURS SCHEDULED
Status: COMPLETED | OUTPATIENT
Start: 2022-08-04 | End: 2022-08-04

## 2022-08-03 RX ORDER — SODIUM CHLORIDE 0.9 % (FLUSH) 0.9 %
10 SYRINGE (ML) INJECTION EVERY 12 HOURS SCHEDULED
Status: DISCONTINUED | OUTPATIENT
Start: 2022-08-03 | End: 2022-08-04

## 2022-08-03 RX ORDER — DIPHENHYDRAMINE HYDROCHLORIDE 50 MG/ML
12.5 INJECTION INTRAMUSCULAR; INTRAVENOUS EVERY 6 HOURS PRN
Status: ACTIVE | OUTPATIENT
Start: 2022-08-03 | End: 2022-08-04

## 2022-08-03 RX ORDER — ONDANSETRON 2 MG/ML
4 INJECTION INTRAMUSCULAR; INTRAVENOUS EVERY 6 HOURS PRN
Status: DISCONTINUED | OUTPATIENT
Start: 2022-08-03 | End: 2022-08-05 | Stop reason: HOSPADM

## 2022-08-03 RX ORDER — MISOPROSTOL 100 UG/1
200 TABLET ORAL EVERY 6 HOURS SCHEDULED
Status: DISCONTINUED | OUTPATIENT
Start: 2022-08-03 | End: 2022-08-03 | Stop reason: HOSPADM

## 2022-08-03 RX ORDER — SODIUM CHLORIDE, SODIUM LACTATE, POTASSIUM CHLORIDE, CALCIUM CHLORIDE 600; 310; 30; 20 MG/100ML; MG/100ML; MG/100ML; MG/100ML
125 INJECTION, SOLUTION INTRAVENOUS CONTINUOUS
Status: DISCONTINUED | OUTPATIENT
Start: 2022-08-03 | End: 2022-08-04

## 2022-08-03 RX ORDER — KETOROLAC TROMETHAMINE 30 MG/ML
15 INJECTION, SOLUTION INTRAMUSCULAR; INTRAVENOUS EVERY 6 HOURS
Status: COMPLETED | OUTPATIENT
Start: 2022-08-04 | End: 2022-08-04

## 2022-08-03 RX ORDER — CALCIUM CARBONATE 200(500)MG
1 TABLET,CHEWABLE ORAL EVERY 4 HOURS PRN
Status: DISCONTINUED | OUTPATIENT
Start: 2022-08-03 | End: 2022-08-05 | Stop reason: HOSPADM

## 2022-08-03 RX ORDER — IBUPROFEN 600 MG/1
600 TABLET ORAL EVERY 6 HOURS
Status: DISCONTINUED | OUTPATIENT
Start: 2022-08-04 | End: 2022-08-03

## 2022-08-03 RX ORDER — SODIUM CHLORIDE 0.9 % (FLUSH) 0.9 %
3 SYRINGE (ML) INJECTION EVERY 12 HOURS SCHEDULED
Status: DISCONTINUED | OUTPATIENT
Start: 2022-08-03 | End: 2022-08-03

## 2022-08-03 RX ORDER — ONDANSETRON 2 MG/ML
4 INJECTION INTRAMUSCULAR; INTRAVENOUS EVERY 6 HOURS PRN
Status: ACTIVE | OUTPATIENT
Start: 2022-08-03 | End: 2022-08-04

## 2022-08-03 RX ORDER — NALOXONE HCL 0.4 MG/ML
0.4 VIAL (ML) INJECTION ONCE AS NEEDED
Status: ACTIVE | OUTPATIENT
Start: 2022-08-03 | End: 2022-08-04

## 2022-08-03 RX ORDER — MISOPROSTOL 200 UG/1
TABLET ORAL
Status: DISPENSED
Start: 2022-08-03 | End: 2022-08-04

## 2022-08-03 RX ORDER — OXYTOCIN/0.9 % SODIUM CHLORIDE 30/500 ML
50 PLASTIC BAG, INJECTION (ML) INTRAVENOUS CONTINUOUS
Status: DISCONTINUED | OUTPATIENT
Start: 2022-08-03 | End: 2022-08-04

## 2022-08-03 RX ORDER — METHYLERGONOVINE MALEATE 0.2 MG/ML
INJECTION INTRAVENOUS
Status: DISPENSED
Start: 2022-08-03 | End: 2022-08-04

## 2022-08-03 RX ORDER — SODIUM CHLORIDE 0.9 % (FLUSH) 0.9 %
3 SYRINGE (ML) INJECTION EVERY 12 HOURS SCHEDULED
Status: DISCONTINUED | OUTPATIENT
Start: 2022-08-03 | End: 2022-08-03 | Stop reason: HOSPADM

## 2022-08-03 RX ORDER — KETOROLAC TROMETHAMINE 30 MG/ML
15 INJECTION, SOLUTION INTRAMUSCULAR; INTRAVENOUS EVERY 6 HOURS
Status: DISCONTINUED | OUTPATIENT
Start: 2022-08-03 | End: 2022-08-03

## 2022-08-03 RX ORDER — LOPERAMIDE HYDROCHLORIDE 2 MG/1
4 CAPSULE ORAL 4 TIMES DAILY PRN
Status: DISCONTINUED | OUTPATIENT
Start: 2022-08-03 | End: 2022-08-05 | Stop reason: HOSPADM

## 2022-08-03 RX ADMIN — MIDAZOLAM HYDROCHLORIDE 2 MG: 1 INJECTION, SOLUTION INTRAMUSCULAR; INTRAVENOUS at 17:30

## 2022-08-03 RX ADMIN — ACETAMINOPHEN 1000 MG: 500 TABLET ORAL at 16:19

## 2022-08-03 RX ADMIN — SODIUM CHLORIDE, POTASSIUM CHLORIDE, SODIUM LACTATE AND CALCIUM CHLORIDE 1000 ML: 600; 310; 30; 20 INJECTION, SOLUTION INTRAVENOUS at 15:21

## 2022-08-03 RX ADMIN — ACETAMINOPHEN 1000 MG: 500 TABLET, FILM COATED ORAL at 22:00

## 2022-08-03 RX ADMIN — Medication 25 MG: at 17:29

## 2022-08-03 RX ADMIN — Medication 25 MG: at 17:35

## 2022-08-03 RX ADMIN — ONDANSETRON 4 MG: 2 INJECTION INTRAMUSCULAR; INTRAVENOUS at 17:29

## 2022-08-03 RX ADMIN — TRANEXAMIC ACID 1000 MG: 100 INJECTION, SOLUTION INTRAVENOUS at 17:36

## 2022-08-03 RX ADMIN — MORPHINE SULFATE 3 MG: 0.5 INJECTION, SOLUTION EPIDURAL; INTRATHECAL; INTRAVENOUS at 17:24

## 2022-08-03 RX ADMIN — SODIUM CHLORIDE, POTASSIUM CHLORIDE, SODIUM LACTATE AND CALCIUM CHLORIDE 1000 ML: 600; 310; 30; 20 INJECTION, SOLUTION INTRAVENOUS at 12:34

## 2022-08-03 RX ADMIN — KETOROLAC TROMETHAMINE 30 MG: 30 INJECTION, SOLUTION INTRAMUSCULAR; INTRAVENOUS at 18:17

## 2022-08-03 RX ADMIN — CEFAZOLIN SODIUM 2 G: 2 INJECTION, SOLUTION INTRAVENOUS at 16:22

## 2022-08-03 RX ADMIN — Medication 50 ML/HR: at 18:55

## 2022-08-03 RX ADMIN — METOCLOPRAMIDE HYDROCHLORIDE 10 MG: 5 INJECTION INTRAMUSCULAR; INTRAVENOUS at 17:37

## 2022-08-03 RX ADMIN — SODIUM CHLORIDE, POTASSIUM CHLORIDE, SODIUM LACTATE AND CALCIUM CHLORIDE 125 ML/HR: 600; 310; 30; 20 INJECTION, SOLUTION INTRAVENOUS at 18:55

## 2022-08-03 RX ADMIN — MORPHINE SULFATE 1.75 MG: 0.5 INJECTION, SOLUTION EPIDURAL; INTRATHECAL; INTRAVENOUS at 17:32

## 2022-08-03 RX ADMIN — SODIUM CHLORIDE, POTASSIUM CHLORIDE, SODIUM LACTATE AND CALCIUM CHLORIDE 125 ML/HR: 600; 310; 30; 20 INJECTION, SOLUTION INTRAVENOUS at 16:19

## 2022-08-03 RX ADMIN — EPHEDRINE SULFATE 10 MG: 50 INJECTION INTRAVENOUS at 17:27

## 2022-08-03 RX ADMIN — MORPHINE SULFATE 0.25 MG: 0.5 INJECTION, SOLUTION EPIDURAL; INTRATHECAL; INTRAVENOUS at 17:05

## 2022-08-03 RX ADMIN — EPHEDRINE SULFATE 10 MG: 50 INJECTION INTRAVENOUS at 17:13

## 2022-08-03 RX ADMIN — OXYTOCIN 30 UNITS: 10 INJECTION, SOLUTION INTRAMUSCULAR; INTRAVENOUS at 17:18

## 2022-08-03 NOTE — ANESTHESIA PREPROCEDURE EVALUATION
Anesthesia Evaluation     Patient summary reviewed and Nursing notes reviewed   no history of anesthetic complications:  NPO Solid Status: > 8 hours  NPO Liquid Status: > 2 hours           Airway   Mallampati: II  TM distance: >3 FB  Neck ROM: full  No difficulty expected  Dental      Pulmonary - negative pulmonary ROS and normal exam    breath sounds clear to auscultation  Cardiovascular - negative cardio ROS and normal exam  Exercise tolerance: good (4-7 METS)    Rhythm: regular  Rate: normal        Neuro/Psych  (+) headaches,    GI/Hepatic/Renal/Endo - negative ROS     Musculoskeletal     Abdominal    Substance History - negative use     OB/GYN    (+) Pregnant,     Comment: hgb 7, platlets ok      Other   arthritis,                      Anesthesia Plan    ASA 3     spinal       Anesthetic plan, risks, benefits, and alternatives have been provided, discussed and informed consent has been obtained with: patient and spouse/significant other.        CODE STATUS:    Code Status (Patient has no pulse and is not breathing): CPR (Attempt to Resuscitate)  Medical Interventions (Patient has pulse or is breathing): Full

## 2022-08-03 NOTE — ANESTHESIA PROCEDURE NOTES
Spinal Block      Patient reassessed immediately prior to procedure    Patient location during procedure: OB  Start Time: 8/3/2022 5:04 PM  Stop Time: 8/3/2022 5:07 PM  Indication:procedure for pain  Performed By  CRNA/CAA: Anthony Tang CRNA  Preanesthetic Checklist  Completed: patient identified, IV checked, risks and benefits discussed, surgical consent, monitors and equipment checked, pre-op evaluation and timeout performed  Spinal Block Prep:  Patient Position:sitting  Sterile Tech:gloves, mask and sterile barriers  Prep:Chloraprep  Patient Monitoring:blood pressure monitoring, continuous pulse oximetry and EKG  Spinal Block Procedure  Approach:midline  Guidance:landmark technique  Location:L3-L4  Needle Type:Pencan  Needle Gauge:25 G  Placement of Spinal needle event:cerebrospinal fluid aspirated  Paresthesia: no  Fluid Appearance:clear  Medications: morphine PF (DURAMORPH) injection, 0.25 mg  Med Administered at 8/3/2022 5:05 PM   Post Assessment  Patient Tolerance:patient tolerated the procedure well with no apparent complications  Complications no  Additional Notes  Pt positioned sitting for SAB, sterile technique utilized- latex free, L3/L4 landmarks, sterile drape, 1% lidocaine skin wheal 1cc, insertion of 20g introducer followed by 25g Pencan needle midline approach, + clear CSF/negative heme/negative paresthesia, injection of 1.4cc 0.75% Bupivacaine in dextrose with 0.25mg morphine PF, patient placed immediately supine with FAYE, T10 level obtained, patient tolerated well.

## 2022-08-03 NOTE — PROGRESS NOTES
Pre-Op Note:  Patient is 23 yo  female at 38 3/7 weeks EGA who presented to L&D with c/o painful contractions.  She received IVF but contractions continued every 1-2 minutes.  She had course of steroids 2 weeks ago.  Patient has prior C/S and desires to proceed with repeat LTCS.  Risks of the surgery including infection,bleeding,damage to internal organs(bladder,bowel,vessels and nerves and blood transfusion were discussed with patient and she consented.

## 2022-08-03 NOTE — PLAN OF CARE
Problem: Adult Inpatient Plan of Care  Goal: Plan of Care Review  Outcome: Ongoing, Progressing  Goal: Patient-Specific Goal (Individualized)  Outcome: Ongoing, Progressing  Goal: Absence of Hospital-Acquired Illness or Injury  Outcome: Ongoing, Progressing  Goal: Optimal Comfort and Wellbeing  Outcome: Ongoing, Progressing  Goal: Readiness for Transition of Care  Outcome: Ongoing, Progressing  Intervention: Mutually Develop Transition Plan  Recent Flowsheet Documentation  Taken 8/3/2022 1344 by Ester Galicia, RN  Equipment Currently Used at Home: none     Problem: Bleeding ( Delivery)  Goal: Bleeding is Controlled  Outcome: Ongoing, Progressing     Problem: Change in Fetal Wellbeing ( Delivery)  Goal: Stable Fetal Wellbeing  Outcome: Ongoing, Progressing     Problem: Infection ( Delivery)  Goal: Absence of Infection Signs and Symptoms  Outcome: Ongoing, Progressing     Problem: Respiratory Compromise ( Delivery)  Goal: Effective Oxygenation and Ventilation  Outcome: Ongoing, Progressing   Goal Outcome Evaluation:

## 2022-08-03 NOTE — ANESTHESIA PREPROCEDURE EVALUATION
Anesthesia Evaluation     Patient summary reviewed and Nursing notes reviewed   no history of anesthetic complications:  NPO Solid Status: > 8 hours  NPO Liquid Status: > 2 hours           Airway   Mallampati: II  TM distance: >3 FB  Neck ROM: full  No difficulty expected  Dental      Pulmonary - negative pulmonary ROS and normal exam    breath sounds clear to auscultation  Cardiovascular - negative cardio ROS and normal exam  Exercise tolerance: good (4-7 METS)    Rhythm: regular  Rate: normal        Neuro/Psych  (+) headaches,    GI/Hepatic/Renal/Endo - negative ROS     Musculoskeletal     Abdominal    Substance History - negative use     OB/GYN    (+) Pregnant,     Comment: Hgb 7, platelets ok      Other   arthritis,                      Anesthesia Plan    ASA 3     spinal       Anesthetic plan, risks, benefits, and alternatives have been provided, discussed and informed consent has been obtained with: patient and spouse/significant other.        CODE STATUS:    Code Status (Patient has no pulse and is not breathing): CPR (Attempt to Resuscitate)  Medical Interventions (Patient has pulse or is breathing): Full

## 2022-08-03 NOTE — OP NOTE
SECTION REPEAT  Procedure Report    Patient Name:  Riir Martinez  YOB: 2000    Date of Surgery:  8/3/2022     Indications: Patient is a 22-year-old  4 now para 2 female at 38 weeks and 3 days estimate gestational age who presented with painful uterine contractions.  She received IV fluids but contractions continued at a rate of 1 to 2 minutes.  She has a history of prior  section and desired elective repeat  section.  Risks of surgical infection bleeding damage to internal organs: Bladder bowel vessel nerves or blood transfusion were discussed patient she consented.    Pre-op Diagnosis:   IUP at 38 weeks and 3 days estimate gestational age with painful contractions and prior certain section.       Post-Op Diagnosis Codes:  Same    Procedure/CPT® Codes:      Procedure(s):   SECTION REPEAT    Staff:  Surgeon(s):  Rudy Alcantar MD         Anesthesia: Choice    Estimated Blood Loss: 600 cc    Implants:    Nothing was implanted during the procedure    Specimen:          Gas and cord blood        Findings: Viable female infant weighing 6 pounds 14.  8 ounces Apgars 8 and 9.  Normal uterus tubes and ovaries.    Complications:   None  Description of Procedure: Patient was taken to the operating room where spinal anesthesia was placed without any difficulty.  She was then prepped and draped in normal fashion in the supine position.  Low transverse skin incision was made sharply and carried through to the underlying layer of fascia.  Fascia was nicked in the midline incision was extended bilaterally sharply.  Superior aspect of the rectus fascia was grasped with Abdulaziz clamps and the rectus muscle was dissected of the rectus fascia sharply.  Similarly the inferior aspect of the rectus fascia was grasped with Abdulaziz clamps and the rectus muscle was dissected off the rectus fascia sharply.  Peritoneum was identified entered bluntly.  A low transverse uterine  incision was made sharply.  Infant's head was delivered nose and mouth were bulb suction.  Cord was clamped and cut after 30 seconds.  Infant was handed off to the nurse for further care.  Cord gas and cord blood were then obtained.  Placenta was delivered via gentle traction.  Uterus was exteriorized and cleared of all clots and debris's.  Uterine incision was repaired with 0 Vicryl suture in a continuous locking fashion.  Second 0 Vicryl suture was used to imbricate the first layer.  Gutters were cleared of all clots and debris's and were irrigated copious amount normal saline.  Uterus was replaced into the abdomen incision was packed and hemostasis was assured.  Rectus fascia was approximated with 0 Vicryl suture in a continuous fashion.  Incision was irrigated with copious amount of normal saline.  Subcutaneous tissue was approximated 3 Monocryl suture in a continuous fashion.  Skin was closed with 3 Monocryl suture in a subcuticular fashion.  Sponge laps and needle counts were correct x2.  Patient was taken to recovery in stable condition.            Rudy Alcantar MD     Date: 8/3/2022  Time: 18:01 EDT

## 2022-08-04 LAB
ANISOCYTOSIS BLD QL: NORMAL
BASOPHILS # BLD AUTO: 0.04 10*3/MM3 (ref 0–0.2)
BASOPHILS NFR BLD AUTO: 0.4 % (ref 0–1.5)
DACRYOCYTES BLD QL SMEAR: NORMAL
DEPRECATED RDW RBC AUTO: 42.6 FL (ref 37–54)
EOSINOPHIL # BLD AUTO: 0.05 10*3/MM3 (ref 0–0.4)
EOSINOPHIL NFR BLD AUTO: 0.5 % (ref 0.3–6.2)
ERYTHROCYTE [DISTWIDTH] IN BLOOD BY AUTOMATED COUNT: 16.5 % (ref 12.3–15.4)
HCT VFR BLD AUTO: 21.1 % (ref 34–46.6)
HGB BLD-MCNC: 6.4 G/DL (ref 12–15.9)
HYPOCHROMIA BLD QL: NORMAL
IMM GRANULOCYTES # BLD AUTO: 0.07 10*3/MM3 (ref 0–0.05)
IMM GRANULOCYTES NFR BLD AUTO: 0.7 % (ref 0–0.5)
LYMPHOCYTES # BLD AUTO: 0.79 10*3/MM3 (ref 0.7–3.1)
LYMPHOCYTES NFR BLD AUTO: 8 % (ref 19.6–45.3)
MCH RBC QN AUTO: 21.9 PG (ref 26.6–33)
MCHC RBC AUTO-ENTMCNC: 30.3 G/DL (ref 31.5–35.7)
MCV RBC AUTO: 72.3 FL (ref 79–97)
MICROCYTES BLD QL: NORMAL
MONOCYTES # BLD AUTO: 0.62 10*3/MM3 (ref 0.1–0.9)
MONOCYTES NFR BLD AUTO: 6.3 % (ref 5–12)
NEUTROPHILS NFR BLD AUTO: 8.34 10*3/MM3 (ref 1.7–7)
NEUTROPHILS NFR BLD AUTO: 84.1 % (ref 42.7–76)
NRBC BLD AUTO-RTO: 0 /100 WBC (ref 0–0.2)
OVALOCYTES BLD QL SMEAR: NORMAL
PLATELET # BLD AUTO: 183 10*3/MM3 (ref 140–450)
PMV BLD AUTO: 10.1 FL (ref 6–12)
RBC # BLD AUTO: 2.92 10*6/MM3 (ref 3.77–5.28)
SMALL PLATELETS BLD QL SMEAR: ADEQUATE
STOMATOCYTES BLD QL SMEAR: NORMAL
TARGETS BLD QL SMEAR: NORMAL
WBC MORPH BLD: NORMAL
WBC NRBC COR # BLD: 9.91 10*3/MM3 (ref 3.4–10.8)

## 2022-08-04 PROCEDURE — 85007 BL SMEAR W/DIFF WBC COUNT: CPT | Performed by: OBSTETRICS & GYNECOLOGY

## 2022-08-04 PROCEDURE — 85025 COMPLETE CBC W/AUTO DIFF WBC: CPT | Performed by: OBSTETRICS & GYNECOLOGY

## 2022-08-04 PROCEDURE — 25010000002 CEFAZOLIN IN DEXTROSE 2-4 GM/100ML-% SOLUTION: Performed by: OBSTETRICS & GYNECOLOGY

## 2022-08-04 PROCEDURE — 25010000002 KETOROLAC TROMETHAMINE PER 15 MG: Performed by: OBSTETRICS & GYNECOLOGY

## 2022-08-04 RX ORDER — FERROUS SULFATE 325(65) MG
325 TABLET ORAL 2 TIMES DAILY WITH MEALS
Status: DISCONTINUED | OUTPATIENT
Start: 2022-08-04 | End: 2022-08-05 | Stop reason: HOSPADM

## 2022-08-04 RX ORDER — PRENATAL VIT/IRON FUM/FOLIC AC 27MG-0.8MG
1 TABLET ORAL DAILY
Status: DISCONTINUED | OUTPATIENT
Start: 2022-08-04 | End: 2022-08-05 | Stop reason: HOSPADM

## 2022-08-04 RX ORDER — CEFAZOLIN SODIUM 2 G/100ML
2 INJECTION, SOLUTION INTRAVENOUS EVERY 8 HOURS
Status: COMPLETED | OUTPATIENT
Start: 2022-08-04 | End: 2022-08-04

## 2022-08-04 RX ADMIN — PRENATAL WITH FERROUS FUM AND FOLIC ACID 1 TABLET: 3080; 920; 120; 400; 22; 1.84; 3; 20; 10; 1; 12; 200; 27; 25; 2 TABLET ORAL at 11:42

## 2022-08-04 RX ADMIN — ACETAMINOPHEN 1000 MG: 500 TABLET, FILM COATED ORAL at 16:46

## 2022-08-04 RX ADMIN — MISOPROSTOL 200 MCG: 200 TABLET ORAL at 00:03

## 2022-08-04 RX ADMIN — MAGNESIUM HYDROXIDE 5 ML: 2400 SUSPENSION ORAL at 08:24

## 2022-08-04 RX ADMIN — MAGNESIUM HYDROXIDE 5 ML: 2400 SUSPENSION ORAL at 00:05

## 2022-08-04 RX ADMIN — CEFAZOLIN SODIUM 2 G: 2 INJECTION, SOLUTION INTRAVENOUS at 11:42

## 2022-08-04 RX ADMIN — FERROUS SULFATE TAB 325 MG (65 MG ELEMENTAL FE) 325 MG: 325 (65 FE) TAB at 08:23

## 2022-08-04 RX ADMIN — IBUPROFEN 600 MG: 600 TABLET ORAL at 17:45

## 2022-08-04 RX ADMIN — FERROUS SULFATE TAB 325 MG (65 MG ELEMENTAL FE) 325 MG: 325 (65 FE) TAB at 17:46

## 2022-08-04 RX ADMIN — MISOPROSTOL 200 MCG: 200 TABLET ORAL at 06:16

## 2022-08-04 RX ADMIN — MISOPROSTOL 200 MCG: 200 TABLET ORAL at 17:45

## 2022-08-04 RX ADMIN — MISOPROSTOL 200 MCG: 200 TABLET ORAL at 11:42

## 2022-08-04 RX ADMIN — MAGNESIUM HYDROXIDE 5 ML: 2400 SUSPENSION ORAL at 23:32

## 2022-08-04 RX ADMIN — KETOROLAC TROMETHAMINE 15 MG: 30 INJECTION, SOLUTION INTRAMUSCULAR; INTRAVENOUS at 11:42

## 2022-08-04 RX ADMIN — CEFAZOLIN SODIUM 2 G: 2 INJECTION, SOLUTION INTRAVENOUS at 18:49

## 2022-08-04 RX ADMIN — KETOROLAC TROMETHAMINE 15 MG: 30 INJECTION, SOLUTION INTRAMUSCULAR; INTRAVENOUS at 06:16

## 2022-08-04 RX ADMIN — KETOROLAC TROMETHAMINE 15 MG: 30 INJECTION, SOLUTION INTRAMUSCULAR; INTRAVENOUS at 00:04

## 2022-08-04 RX ADMIN — ACETAMINOPHEN 325MG 650 MG: 325 TABLET ORAL at 21:30

## 2022-08-04 RX ADMIN — ACETAMINOPHEN 1000 MG: 500 TABLET, FILM COATED ORAL at 10:48

## 2022-08-04 RX ADMIN — MAGNESIUM HYDROXIDE 5 ML: 2400 SUSPENSION ORAL at 16:46

## 2022-08-04 RX ADMIN — IBUPROFEN 600 MG: 600 TABLET ORAL at 23:32

## 2022-08-04 RX ADMIN — Medication 50 ML/HR: at 08:24

## 2022-08-04 RX ADMIN — OXYCODONE HYDROCHLORIDE 10 MG: 5 TABLET ORAL at 23:32

## 2022-08-04 NOTE — ANESTHESIA POSTPROCEDURE EVALUATION
Patient: Riri Martinez    Procedure Summary     Date: 22 Room / Location:     Anesthesia Start:  Anesthesia Stop:     Procedure:  SECTION REPEAT (canceled) Diagnosis:     Scheduled Providers:  Provider:     Anesthesia Type: spinal ASA Status: 3          Anesthesia Type: spinal    Vitals  No vitals data found for the desired time range.          Post Anesthesia Care and Evaluation    Patient location during evaluation: bedside  Patient participation: complete - patient participated  Level of consciousness: awake  Pain score: 0  Pain management: adequate    Airway patency: patent  Anesthetic complications: No anesthetic complications  PONV Status: none  Cardiovascular status: acceptable and stable  Respiratory status: acceptable and room air  Hydration status: acceptable  Post Neuraxial Block status: Motor and sensory function returned to baseline and No signs or symptoms of PDPH

## 2022-08-04 NOTE — PLAN OF CARE
Problem: Adult Inpatient Plan of Care  Goal: Plan of Care Review  Outcome: Ongoing, Progressing  Flowsheets (Taken 2022 1732)  Progress: improving  Plan of Care Reviewed With: patient  Goal: Patient-Specific Goal (Individualized)  Outcome: Ongoing, Progressing  Goal: Absence of Hospital-Acquired Illness or Injury  Outcome: Ongoing, Progressing  Intervention: Identify and Manage Fall Risk  Recent Flowsheet Documentation  Taken 2022 08 by Milena Leslie RN  Safety Promotion/Fall Prevention: safety round/check completed  Intervention: Prevent and Manage VTE (Venous Thromboembolism) Risk  Recent Flowsheet Documentation  Taken 2022 08 by Milena Leslie RN  Activity Management: up ad margy  Intervention: Prevent Infection  Recent Flowsheet Documentation  Taken 2022 08 by Milena Leslie RN  Infection Prevention:   visitors restricted/screened   hand hygiene promoted   equipment surfaces disinfected   cohorting utilized   rest/sleep promoted   single patient room provided  Goal: Optimal Comfort and Wellbeing  Outcome: Ongoing, Progressing  Intervention: Provide Person-Centered Care  Recent Flowsheet Documentation  Taken 2022 by Milena Leslie RN  Trust Relationship/Rapport:   care explained   choices provided   emotional support provided   empathic listening provided   questions answered   questions encouraged   reassurance provided   thoughts/feelings acknowledged  Goal: Readiness for Transition of Care  Outcome: Ongoing, Progressing     Problem: Skin Injury Risk Increased  Goal: Skin Health and Integrity  Outcome: Ongoing, Progressing     Problem: Adjustment to Role Transition (Postpartum  Delivery)  Goal: Successful Maternal Role Transition  Outcome: Ongoing, Progressing     Problem: Bleeding (Postpartum  Delivery)  Goal: Hemostasis  Outcome: Ongoing, Progressing     Problem: Infection (Postpartum  Delivery)  Goal: Absence of Infection Signs and  Symptoms  Outcome: Ongoing, Progressing     Problem: Pain (Postpartum  Delivery)  Goal: Acceptable Pain Control  Outcome: Ongoing, Progressing     Problem: Postoperative Nausea and Vomiting (Postpartum  Delivery)  Goal: Nausea and Vomiting Relief  Outcome: Ongoing, Progressing     Problem: Postoperative Urinary Retention (Postpartum  Delivery)  Goal: Effective Urinary Elimination  Outcome: Ongoing, Progressing   Goal Outcome Evaluation:  Plan of Care Reviewed With: patient        Progress: improving

## 2022-08-04 NOTE — ANESTHESIA POSTPROCEDURE EVALUATION
Patient: Riri Martinez    Procedure Summary     Date: 22 Room / Location: [facility] LABOR DELIVERY  [facility] LABOR DELIVERY    Anesthesia Start:  Anesthesia Stop:     Procedure:  SECTION REPEAT (N/A Abdomen) Diagnosis: (Repeat Csx in labor with contractions)    Surgeons: Rudy Alcantar MD Provider: Yazan Frederick MD    Anesthesia Type: spinal ASA Status: 3          Anesthesia Type: spinal    Vitals  Vitals Value Taken Time   /58 22 0557   Temp 36.3 °C (97.4 °F) 22 0557   Pulse 81 22 0557   Resp 16 22 0557   SpO2 100 % 22 2100           Post Anesthesia Care and Evaluation    Patient location during evaluation: bedside  Patient participation: complete - patient participated  Level of consciousness: awake  Pain management: adequate    Airway patency: patent  Anesthetic complications: No anesthetic complications  PONV Status: none  Cardiovascular status: acceptable and stable  Respiratory status: acceptable  Hydration status: acceptable    Comments: An Anesthesiologist personally participated in the most demanding procedures (including induction and emergence if applicable) in the anesthesia plan, monitored the course of anesthesia administration at frequent intervals and remained physically present and available for immediate diagnosis and treatment of emergencies.

## 2022-08-04 NOTE — PLAN OF CARE
Problem: Bleeding ( Delivery)  Goal: Bleeding is Controlled  Outcome: Met     Problem: Change in Fetal Wellbeing ( Delivery)  Goal: Stable Fetal Wellbeing  Outcome: Met     Problem: Infection ( Delivery)  Goal: Absence of Infection Signs and Symptoms  Outcome: Met  Intervention: Minimize Infection Risk  Recent Flowsheet Documentation  Taken 2022 0820 by Milena Leslie, RN  Infection Prevention:   visitors restricted/screened   hand hygiene promoted   equipment surfaces disinfected   cohorting utilized   rest/sleep promoted   single patient room provided     Problem: Respiratory Compromise ( Delivery)  Goal: Effective Oxygenation and Ventilation  Outcome: Met   Goal Outcome Evaluation:

## 2022-08-04 NOTE — NURSING NOTE
Lab just called to notify RN of critical lab, hemoglobin 6.4.  Dr. Alcantar called on his cell @ 0803 and notified that pts morning labs came back with a critical hemoglobin of 6.4 and hematocrit 21.1, pts H&H started out 7.3/24.1 before surgery yesterday.  Pt does not have any symptoms at this time and has been up moving around independently.  MD states to put in a order for PO iron 325mg with meals BID.

## 2022-08-04 NOTE — PROGRESS NOTES
IDRIS Valdes   PROGRESS NOTE    Post-Op Day 1 S/P   Subjective   Subjective  Patient reports:  Pain is well controlled with prescription NSAID's and narcotic analgesics.  She is ambulating. Tolerating diet. Tolerating po -- normal.  Intake -- c/o of tolerating po solids.   Voiding - Without difficulty.  Vaginal bleeding is as much as expected.    Objective    Objective     Vitals: Vital Signs Range for the last 24 hours  Temperature: Temp:  [97.4 °F (36.3 °C)-98.9 °F (37.2 °C)] 98.1 °F (36.7 °C)   Temp Source: Temp src: Oral   BP: BP: ()/() 96/57   Pulse: Heart Rate:  [] 84   Respirations: Resp:  [16-20] 18   SPO2: SpO2:  [99 %-100 %] 100 %   O2 Amount (l/min):     O2 Devices     Weight:              Physical Exam        Abdomen Soft, no unexpected tenderness. Bowel sounds normal.    Incision   dressing dry   Extremities Normal, atraumatic, no cyanosis.        I reviewed the patient's new clinical results.  Hemoglobin is 6.4.  The patient started out quite anemic.  She has no orthostatic or hypotensive symptoms whatsoever.    Assessment & Plan         uterine contractions, antepartum    History of tethered spinal cord    Previous  section  Anemia    Assessment & Plan    Assessment:    Riri Martinez is Day 1  post-partum  , Low Transverse   .      Plan:  remove dressing, remove urine catheter, saline lock IV fluids, advance diet  normal diet as tolerated, continue post op care, and plan for discharge In the next day or 2 depending on clinical situation and progression..      Oskar Tom MD  22  12:03 EDT

## 2022-08-05 VITALS
OXYGEN SATURATION: 100 % | DIASTOLIC BLOOD PRESSURE: 56 MMHG | TEMPERATURE: 98.4 F | HEIGHT: 59 IN | WEIGHT: 119 LBS | HEART RATE: 85 BPM | RESPIRATION RATE: 16 BRPM | BODY MASS INDEX: 23.99 KG/M2 | SYSTOLIC BLOOD PRESSURE: 98 MMHG

## 2022-08-05 LAB
DEPRECATED RDW RBC AUTO: 44.2 FL (ref 37–54)
ERYTHROCYTE [DISTWIDTH] IN BLOOD BY AUTOMATED COUNT: 16.5 % (ref 12.3–15.4)
HCT VFR BLD AUTO: 21.4 % (ref 34–46.6)
HGB BLD-MCNC: 6.3 G/DL (ref 12–15.9)
MCH RBC QN AUTO: 21.6 PG (ref 26.6–33)
MCHC RBC AUTO-ENTMCNC: 29.4 G/DL (ref 31.5–35.7)
MCV RBC AUTO: 73.5 FL (ref 79–97)
PLATELET # BLD AUTO: 182 10*3/MM3 (ref 140–450)
PMV BLD AUTO: 9.9 FL (ref 6–12)
RBC # BLD AUTO: 2.91 10*6/MM3 (ref 3.77–5.28)
WBC NRBC COR # BLD: 9.49 10*3/MM3 (ref 3.4–10.8)

## 2022-08-05 PROCEDURE — 85027 COMPLETE CBC AUTOMATED: CPT | Performed by: OBSTETRICS & GYNECOLOGY

## 2022-08-05 RX ORDER — IBUPROFEN 600 MG/1
600 TABLET ORAL EVERY 6 HOURS
Qty: 60 TABLET | Refills: 0 | Status: SHIPPED | OUTPATIENT
Start: 2022-08-05 | End: 2023-03-09 | Stop reason: SDUPTHER

## 2022-08-05 RX ORDER — DOCUSATE SODIUM 100 MG/1
100 CAPSULE, LIQUID FILLED ORAL 2 TIMES DAILY
Qty: 60 CAPSULE | Refills: 1 | Status: SHIPPED | OUTPATIENT
Start: 2022-08-05 | End: 2023-03-09

## 2022-08-05 RX ORDER — OXYCODONE HYDROCHLORIDE 5 MG/1
5 TABLET ORAL EVERY 4 HOURS PRN
Qty: 20 TABLET | Refills: 0 | Status: SHIPPED | OUTPATIENT
Start: 2022-08-05 | End: 2022-08-10

## 2022-08-05 RX ORDER — FERROUS SULFATE 325(65) MG
325 TABLET ORAL 2 TIMES DAILY WITH MEALS
Qty: 60 TABLET | Refills: 1 | Status: SHIPPED | OUTPATIENT
Start: 2022-08-05 | End: 2023-03-09

## 2022-08-05 RX ADMIN — IBUPROFEN 600 MG: 600 TABLET ORAL at 04:54

## 2022-08-05 RX ADMIN — FERROUS SULFATE TAB 325 MG (65 MG ELEMENTAL FE) 325 MG: 325 (65 FE) TAB at 08:44

## 2022-08-05 RX ADMIN — PRENATAL WITH FERROUS FUM AND FOLIC ACID 1 TABLET: 3080; 920; 120; 400; 22; 1.84; 3; 20; 10; 1; 12; 200; 27; 25; 2 TABLET ORAL at 08:44

## 2022-08-05 RX ADMIN — ACETAMINOPHEN 325MG 650 MG: 325 TABLET ORAL at 09:47

## 2022-08-05 RX ADMIN — ACETAMINOPHEN 325MG 650 MG: 325 TABLET ORAL at 04:54

## 2022-08-05 RX ADMIN — MAGNESIUM HYDROXIDE 5 ML: 2400 SUSPENSION ORAL at 08:44

## 2022-08-05 NOTE — LACTATION NOTE
LC in to follow up with breastfeeding/lactation progress. Patient still expresses her desire to pump and bottle feed. LC instructed her on a good pumping plan and expectations with this over the next 2 weeks. Patient is planning on discharge today. LC discussed normal infant output patterns to expect and if infant is not waking by 3 hours to wake and feed using measures shown in the hospital. LC discussed checking to make sure new medications are safe to breastfeed. LC discussed alcohol use and cigarette/second hand smoke around baby and breastfeeding and discussed the impact of street drugs on infants and breastfeeding. LC used the page in the breastfeeding guide to discuss harmful effects of these. Breastfeeding/Lactation expectations and anticipatory guidance discussed for the next two weeks . LC discussed nipple care, plugged ducts, engorgement, and breast infection. LC encouraged mom to see pediatrician two days from discharge for follow up. Patient has a breastpump for home use and LC discussed good pumping guidelines and normal expectations with pumping and storage and preparation of ebm for feedings. LC discussed breastfeeding/lactation resources after discharge and when to call the doctor. Patient showed good understanding.

## 2022-08-05 NOTE — PLAN OF CARE
Problem: Adult Inpatient Plan of Care  Goal: Plan of Care Review  Outcome: Ongoing, Progressing  Goal: Patient-Specific Goal (Individualized)  Outcome: Ongoing, Progressing  Goal: Absence of Hospital-Acquired Illness or Injury  Outcome: Ongoing, Progressing  Intervention: Identify and Manage Fall Risk  Recent Flowsheet Documentation  Taken 8/5/2022 0455 by Hitesh Gerard RN  Safety Promotion/Fall Prevention: safety round/check completed  Taken 8/5/2022 0030 by Hitesh Gerard RN  Safety Promotion/Fall Prevention: safety round/check completed  Taken 8/4/2022 2333 by Hitesh Gerard RN  Safety Promotion/Fall Prevention: safety round/check completed  Taken 8/4/2022 2200 by Hitesh Gerard RN  Safety Promotion/Fall Prevention: safety round/check completed  Taken 8/4/2022 2130 by Hitesh Gerard RN  Safety Promotion/Fall Prevention: safety round/check completed  Taken 8/4/2022 1945 by Hitesh Gerard RN  Safety Promotion/Fall Prevention: safety round/check completed  Intervention: Prevent Skin Injury  Recent Flowsheet Documentation  Taken 8/4/2022 1945 by Hitesh Gerard RN  Body Position: position changed independently  Intervention: Prevent and Manage VTE (Venous Thromboembolism) Risk  Recent Flowsheet Documentation  Taken 8/4/2022 1945 by Hitesh Gerard RN  Activity Management: up ad margy  VTE Prevention/Management: (pt ambulating in room)   bilateral   sequential compression devices off  Goal: Optimal Comfort and Wellbeing  Outcome: Ongoing, Progressing  Intervention: Monitor Pain and Promote Comfort  Recent Flowsheet Documentation  Taken 8/5/2022 0455 by Hitesh Gerard RN  Pain Management Interventions: see MAR  Taken 8/4/2022 2333 by Hitesh Gerard RN  Pain Management Interventions: see MAR  Taken 8/4/2022 2130 by Hitesh Gerard RN  Pain Management Interventions: see MAR  Taken 8/4/2022 1945 by Hitesh Gerard RN  Pain Management Interventions: pain management plan reviewed with  patient/caregiver  Goal: Readiness for Transition of Care  Outcome: Ongoing, Progressing     Problem: Skin Injury Risk Increased  Goal: Skin Health and Integrity  Outcome: Ongoing, Progressing  Intervention: Optimize Skin Protection  Recent Flowsheet Documentation  Taken 2022 by Hitesh Gerard RN  Head of Bed (HOB) Positioning: HOB elevated     Problem: Adjustment to Role Transition (Postpartum  Delivery)  Goal: Successful Maternal Role Transition  Outcome: Ongoing, Progressing     Problem: Bleeding (Postpartum  Delivery)  Goal: Hemostasis  Outcome: Ongoing, Progressing     Problem: Infection (Postpartum  Delivery)  Goal: Absence of Infection Signs and Symptoms  Outcome: Ongoing, Progressing     Problem: Pain (Postpartum  Delivery)  Goal: Acceptable Pain Control  Outcome: Ongoing, Progressing  Intervention: Prevent or Manage Pain  Recent Flowsheet Documentation  Taken 2022 0455 by Hitesh Gerard RN  Pain Management Interventions: see MAR  Taken 2022 2333 by Hitesh Gerard RN  Pain Management Interventions: see MAR  Taken 2022 by Hitesh Gerard RN  Pain Management Interventions: see MAR  Taken 2022 by Hitesh Gerard RN  Pain Management Interventions: pain management plan reviewed with patient/caregiver     Problem: Postoperative Nausea and Vomiting (Postpartum  Delivery)  Goal: Nausea and Vomiting Relief  Outcome: Ongoing, Progressing     Problem: Postoperative Urinary Retention (Postpartum  Delivery)  Goal: Effective Urinary Elimination  Outcome: Ongoing, Progressing  Intervention: Monitor and Manage Urinary Retention  Recent Flowsheet Documentation  Taken 2022 by Hitesh Gerard RN  Urinary Elimination Promotion: frequent voiding encouraged     Problem: Breastfeeding  Goal: Effective Breastfeeding  Outcome: Ongoing, Progressing   Goal Outcome Evaluation:      Patient resting well in bed with infant in arms and  significant other at bedside.  Assessment and lochia WNL. Patient's pain is being controlled buy schedule and PRN medication.  Patient caring for infant in room.

## 2022-08-05 NOTE — DISCHARGE SUMMARY
IDRIS Valdes  Delivery Discharge Summary    Primary OB Clinician: Rudy Alcantar    EDC: Estimated Date of Delivery: 22    Gestational Age:38w3d    Antepartum complications:  labor    Date of Delivery: 8/3/2022   Time of Delivery: 5:15 PM     Delivered By:  Rudy Alcantar     Delivery Type: , Low Transverse      Tubal Ligation: n/a    Baby:female  infant;   Apgar:  8  @ 1 minute /   Apgar:  9  @ 5 minutes   Weight: 3140 g (6 lb 14.8 oz)    Length: 19.5     Anesthesia: Spinal      Intrapartum complications: Anemia, chronic    Laceration: None    Episiotomy: No    Placenta: Expressed     Feeding method: Breastfeeding Status: Yes    Discharge Date: 2022; Discharge Time: 08:00 EDT        Hospital course and discharge exam:  Patient is 22-year-old  4 now para 2 female at 38 weeks and 3 days estimate gestational age who was admitted with painful contractions.  Contractions persisted despite IV fluids.  She has prior  section and she underwent repeat low transverse transection.  Delivery was productive viable female infant weighing 6 pounds 14.8 ounces with Apgars of 8 and 9.  Postoperative course patient has been uncomplicated.  She is anemic and she was started on iron twice daily.  She is tolerating p.o. intake without difficulty.  She reports pain is under good control.  At the time of exam:  She is afebrile vital signs stable.  Lungs are clear to auscultation bilaterally.  Heart is regular rate and rhythm.  Abdomen soft tender to palpation nondistended.  Incisions healing well without erythema or discharge.  There is an area of bruising superior to the incision.    Patient was sent home on Percocets and Motrin pain control.  She will be given iron for supplementation.  She will be given Colace as needed.  She will follow-up in the office in 2 weeks for incision check.  She is going to follow-up sooner for fever chills sweats or persistent nausea vomiting.    Follow-up appointment  with Dr. Alcantar.

## 2022-08-07 ENCOUNTER — ANESTHESIA (OUTPATIENT)
Dept: LABOR AND DELIVERY | Facility: HOSPITAL | Age: 22
End: 2022-08-07

## 2022-08-10 ENCOUNTER — TELEPHONE (OUTPATIENT)
Dept: LACTATION | Facility: HOSPITAL | Age: 22
End: 2022-08-10

## 2022-08-10 NOTE — TELEPHONE ENCOUNTER
MARBELLA called to check with this patient and her breastfeeding progress. Patient did not answer the phone so  provided the lactation dept number and encouraged to call for any lactation needs.

## 2023-03-09 ENCOUNTER — OFFICE VISIT (OUTPATIENT)
Dept: FAMILY MEDICINE CLINIC | Facility: CLINIC | Age: 23
End: 2023-03-09
Payer: MEDICAID

## 2023-03-09 VITALS
BODY MASS INDEX: 21.31 KG/M2 | RESPIRATION RATE: 18 BRPM | TEMPERATURE: 97.8 F | SYSTOLIC BLOOD PRESSURE: 129 MMHG | OXYGEN SATURATION: 100 % | DIASTOLIC BLOOD PRESSURE: 64 MMHG | HEIGHT: 59 IN | WEIGHT: 105.7 LBS | HEART RATE: 106 BPM

## 2023-03-09 DIAGNOSIS — Z30.9 ENCOUNTER FOR CONTRACEPTIVE MANAGEMENT, UNSPECIFIED TYPE: ICD-10-CM

## 2023-03-09 DIAGNOSIS — N94.10 DYSPAREUNIA IN FEMALE: ICD-10-CM

## 2023-03-09 DIAGNOSIS — Z76.89 ESTABLISHING CARE WITH NEW DOCTOR, ENCOUNTER FOR: Primary | ICD-10-CM

## 2023-03-09 LAB
B-HCG UR QL: NEGATIVE
EXPIRATION DATE: NORMAL
INTERNAL NEGATIVE CONTROL: NORMAL
INTERNAL POSITIVE CONTROL: NORMAL
Lab: NORMAL

## 2023-03-09 PROCEDURE — 99204 OFFICE O/P NEW MOD 45 MIN: CPT | Performed by: NURSE PRACTITIONER

## 2023-03-09 PROCEDURE — 81025 URINE PREGNANCY TEST: CPT | Performed by: NURSE PRACTITIONER

## 2023-03-09 RX ORDER — IBUPROFEN 600 MG/1
600 TABLET ORAL EVERY 6 HOURS
Qty: 60 TABLET | Refills: 0 | Status: SHIPPED | OUTPATIENT
Start: 2023-03-09

## 2023-03-09 RX ORDER — MEDROXYPROGESTERONE ACETATE 150 MG/ML
150 INJECTION, SUSPENSION INTRAMUSCULAR
Qty: 0.9 ML | Refills: 3 | Status: SHIPPED | OUTPATIENT
Start: 2023-03-09

## 2023-03-09 RX ORDER — MEDROXYPROGESTERONE ACETATE 150 MG/ML
INJECTION, SUSPENSION INTRAMUSCULAR
COMMUNITY
Start: 2022-12-14 | End: 2023-03-09 | Stop reason: SDUPTHER

## 2023-03-09 NOTE — PROGRESS NOTES
"Chief Complaint  Establish Care (Here to establish care was going to Dr. Alcantar in [place]. ) and Contraception (Has been on the Depo Provera and would like to continue on it, usually would get the shot on the 16th-20th and thinks she is due for it again next week or so. )    Subjective        Riri Martinez presents to [facility]  History of Present Illness  New  patient/establish care:    Previous provider: none. Went to Dr. Alcantar in [place] for GYN    Lives in: [place]     /single: , stays home with children, age 3 and 7 months  Employed:    Current medications: reviewed   Previous labs: reviewed     Nicotine/ETOH use: former smoker, denies ETOH use    Pt currently on depo-provera, began after having last child. She is tolerating medication well. Reports no SE/AEs. Due for next injection next week.           Objective   Vital Signs:  /64 (BP Location: Left arm, Patient Position: Sitting)   Pulse 106   Temp 97.8 °F (36.6 °C) (Infrared)   Resp 18   Ht 149.9 cm (59\")   Wt 47.9 kg (105 lb 11.2 oz)   SpO2 100%   BMI 21.35 kg/m²   Estimated body mass index is 21.35 kg/m² as calculated from the following:    Height as of this encounter: 149.9 cm (59\").    Weight as of this encounter: 47.9 kg (105 lb 11.2 oz).       BMI is within normal parameters. No other follow-up for BMI required.      Physical Exam  Vitals reviewed.   Constitutional:       General: She is not in acute distress.     Appearance: Normal appearance.   HENT:      Head: Normocephalic.      Right Ear: Tympanic membrane normal.      Left Ear: Tympanic membrane normal.      Nose: Nose normal.      Mouth/Throat:      Pharynx: Oropharynx is clear. No posterior oropharyngeal erythema.   Eyes:      General: No scleral icterus.     Extraocular Movements: Extraocular movements intact.      Conjunctiva/sclera: Conjunctivae normal.      Pupils: Pupils are equal, round, and reactive to light. "   Cardiovascular:      Rate and Rhythm: Normal rate and regular rhythm.      Pulses: Normal pulses.      Heart sounds: Normal heart sounds.   Pulmonary:      Effort: Pulmonary effort is normal.      Breath sounds: Normal breath sounds.   Abdominal:      General: Bowel sounds are normal.      Palpations: Abdomen is soft.   Musculoskeletal:         General: Normal range of motion.      Cervical back: Neck supple.   Skin:     General: Skin is warm and dry.   Neurological:      Mental Status: She is alert and oriented to person, place, and time.   Psychiatric:         Mood and Affect: Mood normal.         Behavior: Behavior normal.         Thought Content: Thought content normal.         Judgment: Judgment normal.        Result Review :    Common labs    Common Labs 8/3/22 8/4/22 8/5/22   WBC 7.78 9.91 9.49   Hemoglobin 7.3 (A) 6.4 (A) 6.3 (A)   Hematocrit 24.1 (A) 21.1 (A) 21.4 (A)   Platelets 190 183 182   (A) Abnormal value            Data reviewed: Consultant notes OB/GYN              Assessment and Plan   Diagnoses and all orders for this visit:    1. Establishing care with new doctor, encounter for (Primary)    2. Encounter for contraceptive management, unspecified type  -     POCT pregnancy, urine             Follow Up   No follow-ups on file.  Patient was given instructions and counseling regarding her condition or for health maintenance advice. Please see specific information pulled into the AVS if appropriate.

## 2023-03-10 ENCOUNTER — PATIENT ROUNDING (BHMG ONLY) (OUTPATIENT)
Dept: FAMILY MEDICINE CLINIC | Facility: CLINIC | Age: 23
End: 2023-03-10
Payer: MEDICAID

## 2023-03-10 NOTE — PROGRESS NOTES
A My-Chart message has been sent to the patient for PATIENT ROUNDING with [facility].

## 2023-03-13 ENCOUNTER — CLINICAL SUPPORT (OUTPATIENT)
Dept: FAMILY MEDICINE CLINIC | Facility: CLINIC | Age: 23
End: 2023-03-13
Payer: MEDICAID

## 2023-03-13 DIAGNOSIS — Z30.9 ENCOUNTER FOR CONTRACEPTIVE MANAGEMENT, UNSPECIFIED TYPE: ICD-10-CM

## 2023-03-13 PROCEDURE — 96372 THER/PROPH/DIAG INJ SC/IM: CPT | Performed by: NURSE PRACTITIONER

## 2023-03-13 RX ORDER — MEDROXYPROGESTERONE ACETATE 150 MG/ML
150 INJECTION, SUSPENSION INTRAMUSCULAR ONCE
Status: COMPLETED | OUTPATIENT
Start: 2023-03-13 | End: 2023-03-13

## 2023-03-13 RX ADMIN — MEDROXYPROGESTERONE ACETATE 150 MG: 150 INJECTION, SUSPENSION INTRAMUSCULAR at 15:29

## 2023-04-12 ENCOUNTER — TELEPHONE (OUTPATIENT)
Dept: FAMILY MEDICINE CLINIC | Facility: CLINIC | Age: 23
End: 2023-04-12
Payer: MEDICAID

## 2023-04-12 NOTE — TELEPHONE ENCOUNTER
Caller: Riri Martinez    Relationship: Self    Best call back number: [phone]    What medication are you requesting: SOMETHING FOR POSSIBLE PINK EYE    What are your current symptoms: RED AND DISCHARGE FROM EYES    How long have you been experiencing symptoms: 4 DAYS    Have you had these symptoms before:    [] Yes  [x] No    Have you been treated for these symptoms before:   [] Yes  [x] No    If a prescription is needed, what is your preferred pharmacy and phone number: [id number] [address] AT [facility] & [address] - [phone] [facility] [phone]      Additional notes: PATIENT STATED THAT SHE WAS EXPOSED TO PINK EYE ON 4.8.23. PLEASE CALL THE PATIENT WHEN THIS IS SENT TO THE PHARMACY.

## 2023-04-13 RX ORDER — POLYMYXIN B SULFATE AND TRIMETHOPRIM 1; 10000 MG/ML; [USP'U]/ML
1 SOLUTION OPHTHALMIC EVERY 4 HOURS
Qty: 10 ML | Refills: 0 | Status: SHIPPED | OUTPATIENT
Start: 2023-04-13 | End: 2023-04-20

## 2023-06-04 ENCOUNTER — HOSPITAL ENCOUNTER (EMERGENCY)
Facility: HOSPITAL | Age: 23
Discharge: HOME | End: 2023-06-04
Attending: EMERGENCY MEDICINE | Admitting: EMERGENCY MEDICINE
Payer: MEDICAID

## 2023-06-04 VITALS
RESPIRATION RATE: 18 BRPM | OXYGEN SATURATION: 100 % | DIASTOLIC BLOOD PRESSURE: 70 MMHG | TEMPERATURE: 98.9 F | SYSTOLIC BLOOD PRESSURE: 114 MMHG | HEART RATE: 89 BPM

## 2023-06-04 DIAGNOSIS — H10.32 ACUTE BACTERIAL CONJUNCTIVITIS OF LEFT EYE: Primary | ICD-10-CM

## 2023-06-04 PROCEDURE — 99283 EMERGENCY DEPT VISIT LOW MDM: CPT

## 2023-06-04 RX ORDER — ERYTHROMYCIN 5 MG/G
OINTMENT OPHTHALMIC EVERY 6 HOURS
Qty: 3.5 G | Refills: 0 | Status: SHIPPED | OUTPATIENT
Start: 2023-06-04

## 2023-06-04 RX ORDER — POLYMYXIN B SULFATE AND TRIMETHOPRIM 1; 10000 MG/ML; [USP'U]/ML
1 SOLUTION OPHTHALMIC EVERY 4 HOURS
COMMUNITY
End: 2023-06-06 | Stop reason: ALTCHOICE

## 2023-06-04 RX ORDER — PROPARACAINE HYDROCHLORIDE 5 MG/ML
1 SOLUTION/ DROPS OPHTHALMIC ONCE
Status: COMPLETED | OUTPATIENT
Start: 2023-06-04 | End: 2023-06-04

## 2023-06-04 RX ADMIN — PROPARACAINE HYDROCHLORIDE 1 DROP: 5 SOLUTION/ DROPS OPHTHALMIC at 15:10

## 2023-06-04 NOTE — DISCHARGE INSTRUCTIONS
Apply eye ointment as directed.  Please follow-up with your primary care provider in 1 week if symptoms or not improved.  Return to the emergency department for new or worsening symptoms concerning to you.

## 2023-06-04 NOTE — ED PROVIDER NOTES
Time: 3:08 PM EDT  Date of encounter:  2023  Independent Historian/Clinical History and Information was obtained by:   Patient  Chief Complaint: Left eye pain    History is limited by: N/A    History of Present Illness:  Patient is a 23 y.o. year old female who presents to the emergency department for evaluation of left eye pain.  Patient states initially back in April she was prescribed eyedrops by her primary care provider for similar symptoms which resolved with this.  Patient states since April she has had 3-4 recurrences and this time the eyedrops are not helping her symptoms.  Patient states that her left eye is painful and red.  Patient states she feels that her eyelid is heavy and is causing her pain to open it.  Patient denies a sensation of foreign body in the eye.  Patient states she has been unable to wear her contact in the eye because it is irritating.  Patient denies any right eye symptoms.  Patient denies any visual changes.  Patient does states she was having yellow and green discharge from her left eye but today it is mainly clear fluid.    HPI    Patient Care Team  Primary Care Provider: Dorothy Ogelsby APRN    Past Medical History:     Allergies   Allergen Reactions    Latex Unknown - Low Severity    Adhesive Tape Rash     Past Medical History:   Diagnosis Date    Allergic     Anemia     Anxiety     Arthritis     Chronic back pain     Depression     Migraine      Past Surgical History:   Procedure Laterality Date    BACK SURGERY      tethered cord     SECTION       SECTION N/A 2022    Procedure:  SECTION REPEAT;  Surgeon: Rudy Alcantar MD;  Location: [facility] LABOR DELIVERY;  Service: Gynecology;  Laterality: N/A;    HIP SURGERY Left     HIP SURGERY      REPAIR PATELLAR TENDON Bilateral      Family History   Problem Relation Age of Onset    Diabetes Mother     Stroke Maternal Grandmother        Home Medications:  Prior to Admission medications     Medication Sig Start Date End Date Taking? Authorizing Provider   ibuprofen (ADVIL,MOTRIN) 600 MG tablet Take 1 tablet by mouth Every 6 (Six) Hours. 3/9/23   Dorothy Oglesby APRN   medroxyPROGESTERone Acetate 150 MG/ML suspension prefilled syringe Inject 1 mL into the appropriate muscle as directed by prescriber Every 3 (Three) Months. 3/9/23   Dorothy Oglesby APRN   trimethoprim-polymyxin b (POLYTRIM) 97708-2.1 UNIT/ML-% ophthalmic solution 1 drop Every 4 (Four) Hours.    Provider, Historical, MD        Social History:   Social History     Tobacco Use    Smoking status: Former     Packs/day: 0.25     Years: 2.00     Pack years: 0.50     Types: Cigarettes     Start date:      Quit date:      Years since quittin.4     Passive exposure: Current    Smokeless tobacco: Never   Substance Use Topics    Alcohol use: Never    Drug use: Never         Review of Systems:  Review of Systems   Constitutional:  Negative for fever.   HENT:  Negative for ear pain and sore throat.    Eyes:  Positive for pain and redness. Negative for visual disturbance.      Physical Exam:  /70 (BP Location: Left arm, Patient Position: Sitting)   Pulse 89   Temp 98.9 °F (37.2 °C) (Oral)   Resp 18   SpO2 100%     Physical Exam  Vitals and nursing note reviewed.   Constitutional:       General: She is not in acute distress.     Appearance: Normal appearance. She is normal weight. She is not ill-appearing, toxic-appearing or diaphoretic.   HENT:      Head: Normocephalic and atraumatic.      Nose: Nose normal.   Eyes:      General: Lids are normal. Vision grossly intact.         Right eye: No foreign body, discharge or hordeolum.         Left eye: No foreign body, discharge or hordeolum.      Extraocular Movements: Extraocular movements intact.      Right eye: Normal extraocular motion.      Left eye: Normal extraocular motion.      Conjunctiva/sclera: Conjunctivae normal.      Right eye: Right conjunctiva is not  injected. No chemosis, exudate or hemorrhage.     Left eye: Left conjunctiva is injected. No chemosis, exudate or hemorrhage.     Pupils: Pupils are equal, round, and reactive to light.      Left eye: No corneal abrasion or fluorescein uptake.   Cardiovascular:      Rate and Rhythm: Normal rate and regular rhythm.      Heart sounds: Normal heart sounds.   Pulmonary:      Effort: Pulmonary effort is normal.      Breath sounds: Normal breath sounds.   Abdominal:      General: Abdomen is flat. There is no distension.   Musculoskeletal:         General: Normal range of motion.      Cervical back: Normal range of motion and neck supple.   Skin:     General: Skin is warm and dry.   Neurological:      General: No focal deficit present.      Mental Status: She is alert and oriented to person, place, and time.   Psychiatric:         Mood and Affect: Mood normal.         Behavior: Behavior normal.         Thought Content: Thought content normal.         Judgment: Judgment normal.                Procedures:  Procedures      Medical Decision Making:    Comorbidities that affect care:    None    External Notes reviewed:    None      The following orders were placed and all results were independently analyzed by me:  No orders of the defined types were placed in this encounter.      Medications Given in the Emergency Department:  Medications   proparacaine (ALCAINE) 0.5 % ophthalmic solution 1 drop (1 drop Left Eye Given 6/4/23 1510)        ED Course:         Labs:    Lab Results (last 24 hours)       ** No results found for the last 24 hours. **             Imaging:    No Radiology Exams Resulted Within Past 24 Hours      Differential Diagnosis and Discussion:    Eye Pain/Blurred Vision: Differential diagnosis includes but is not limited to dacryocystitis, hordeolum, chalazion, periorbital cellulitis, cavernous sinus thrombosis, blepharitis, and glaucoma.    MDM  Number of Diagnoses or Management Options  Diagnosis management  comments: Patient presented to the emergency department with left eye pain and redness.  Patient has failed ophthalmic ointments for treatment of this.  Fluorescein exam was completed in the emergency department and there is no corneal abrasion.  I will begin patient on erythromycin ophthalmic ointment.    Risk of Complications, Morbidity, and/or Mortality  Presenting problems: moderate  Diagnostic procedures: low  Management options: low    Patient Progress  Patient progress: stable      Consultants/Shared Management Plan:    None    Social Determinants of Health:    Patient is independent, reliable, and has access to care.       Disposition and Care Coordination:    Discharged: The patient is suitable and stable for discharge with no need for consideration of observation or admission.    I have explained the patient´s condition, diagnoses and treatment plan based on the information available to me at this time. I have answered questions and addressed any concerns. The patient has a good  understanding of the patient´s diagnosis, condition, and treatment plan as can be expected at this point. The vital signs have been stable. The patient´s condition is stable and appropriate for discharge from the emergency department.      The patient will pursue further outpatient evaluation with the primary care physician or other designated or consulting physician as outlined in the discharge instructions. They are agreeable to this plan of care and follow-up instructions have been explained in detail. The patient has received these instructions in written format and have expressed an understanding of the discharge instructions. The patient is aware that any significant change in condition or worsening of symptoms should prompt an immediate return to this or the closest emergency department or call to 911.  I have explained discharge medications and the need for follow up with the patient/caretakers. This was also printed in  the discharge instructions. Patient was discharged with the following medications and follow up:      Medication List        New Prescriptions      erythromycin 5 MG/GM ophthalmic ointment  Commonly known as: ROMYCIN  Administer  into the left eye Every 6 (Six) Hours.               Where to Get Your Medications        These medications were sent to [facility] #[id number] - [place], [place] - [address] [place] [address] AT [facility] & [address] - [phone]  - [phone] [address] [address]      Phone: [phone]   erythromycin 5 MG/GM ophthalmic ointment      Dorothy Oglesby APRN  [address]   [address]  [place] [id number] [phone]    Call          Final diagnoses:   Acute bacterial conjunctivitis of left eye        ED Disposition       ED Disposition   Discharge    Condition   Stable    Comment   --               This medical record created using voice recognition software.             Juan Martel PA-C  06/04/23 4804

## 2023-06-05 ENCOUNTER — OFFICE VISIT (OUTPATIENT)
Dept: OBSTETRICS AND GYNECOLOGY | Facility: CLINIC | Age: 23
End: 2023-06-05
Payer: MEDICAID

## 2023-06-05 ENCOUNTER — PATIENT ROUNDING (BHMG ONLY) (OUTPATIENT)
Dept: OBSTETRICS AND GYNECOLOGY | Facility: CLINIC | Age: 23
End: 2023-06-05

## 2023-06-05 ENCOUNTER — TELEPHONE (OUTPATIENT)
Dept: FAMILY MEDICINE CLINIC | Facility: CLINIC | Age: 23
End: 2023-06-05
Payer: MEDICAID

## 2023-06-05 VITALS
BODY MASS INDEX: 21.77 KG/M2 | DIASTOLIC BLOOD PRESSURE: 77 MMHG | HEIGHT: 59 IN | SYSTOLIC BLOOD PRESSURE: 119 MMHG | WEIGHT: 108 LBS | HEART RATE: 111 BPM

## 2023-06-05 DIAGNOSIS — N94.10 DYSPAREUNIA IN FEMALE: ICD-10-CM

## 2023-06-05 DIAGNOSIS — R10.2 PELVIC PAIN IN FEMALE: Primary | ICD-10-CM

## 2023-06-05 NOTE — TELEPHONE ENCOUNTER
Caller: Riri Martinez A    Relationship to patient: Self    Best call back number: [phone]     Patient is needing: PATIENT STATES THAT SHE WENT TO THE HOSPITAL ON 6/4/23 AND THEY PRESCRIBED HER WITH AN EYE OINTMENT. THEY TOLD HER THAT SHE IS UNABLE TO WEAR HER CONTACTS WHILE USING THIS OINTMENT. PATIENT IS REQUESTING A WORK EXCUSE. PLEASE CALL PATIENT TO ADVISE.

## 2023-06-05 NOTE — PROGRESS NOTES
"GYN Problem/Follow Up Visit    Chief Complaint   Patient presents with    Dyspareunia     PT COMPLAINS OF PAIN IN THE RIGHT PELVIC AREA             HPI  Riri Martinez is a 23 y.o. female, , who presents for right sided pelvic pain x 10 months. States it started right after her c section. States it is daily now. Also states intercourse is painful, every time in every position. Denies any vaginal sx. No new sex partners. Denies bowel or bladder concerns other than hx uti. Started depo after pregnancy. States has some light bleeding at times.     Additional OB/GYN History   No LMP recorded. Patient has had an injection.  Current contraception: contraceptive methods: Depo-Provera injections  Desires to: continue contraception  Allergies : Latex and Adhesive tape     The additional following portions of the patient's history were reviewed and updated as appropriate: allergies, current medications, past family history, past medical history, past social history, past surgical history, and problem list.    Review of Systems    I have reviewed and agree with the HPI, ROS, and historical information as entered above. Stephani Melchor, APRN    Objective   /77   Pulse 111   Ht 149.9 cm (59\")   Wt 49 kg (108 lb)   BMI 21.81 kg/m²     Physical Exam  Vitals reviewed.   Abdominal:      Palpations: Abdomen is soft.      Tenderness: There is no abdominal tenderness (no pelvic tenderness noted with palpation).   Skin:     General: Skin is warm and dry.   Neurological:      Mental Status: She is alert and oriented to person, place, and time.          Assessment and Plan    Diagnoses and all orders for this visit:    1. Pelvic pain in female (Primary)  -     US Non-ob Transvaginal; Future    2. Dyspareunia in female    Will check pelvic u/s. Pt reports had normal pap during pregnancy. F/u after u/s.     Counseling:  She understands the importance of having the above orders performed in a timely fashion.  She is " encouraged to review her results online and/or contact or office if she has questions.     Follow Up:  Return for u/s f/u.      PHANI Fregoso  06/05/2023

## 2023-06-06 ENCOUNTER — OFFICE VISIT (OUTPATIENT)
Dept: FAMILY MEDICINE CLINIC | Facility: CLINIC | Age: 23
End: 2023-06-06
Payer: MEDICAID

## 2023-06-06 VITALS
HEART RATE: 106 BPM | HEIGHT: 59 IN | RESPIRATION RATE: 16 BRPM | TEMPERATURE: 98 F | BODY MASS INDEX: 21.65 KG/M2 | OXYGEN SATURATION: 99 % | WEIGHT: 107.4 LBS | DIASTOLIC BLOOD PRESSURE: 63 MMHG | SYSTOLIC BLOOD PRESSURE: 110 MMHG

## 2023-06-06 DIAGNOSIS — H10.32 ACUTE BACTERIAL CONJUNCTIVITIS OF LEFT EYE: Primary | ICD-10-CM

## 2023-06-06 DIAGNOSIS — Z30.9 ENCOUNTER FOR CONTRACEPTIVE MANAGEMENT, UNSPECIFIED TYPE: ICD-10-CM

## 2023-06-06 PROBLEM — O47.03 THREATENED PRETERM LABOR, THIRD TRIMESTER: Status: RESOLVED | Noted: 2022-07-21 | Resolved: 2023-06-06

## 2023-06-06 PROBLEM — Z98.891 PREVIOUS CESAREAN SECTION: Status: RESOLVED | Noted: 2022-08-03 | Resolved: 2023-06-06

## 2023-06-06 PROBLEM — O47.00 PRETERM UTERINE CONTRACTIONS, ANTEPARTUM: Status: RESOLVED | Noted: 2022-08-03 | Resolved: 2023-06-06

## 2023-06-06 PROBLEM — O36.8130 DECREASED FETAL MOVEMENTS IN THIRD TRIMESTER: Status: RESOLVED | Noted: 2022-07-21 | Resolved: 2023-06-06

## 2023-06-06 PROBLEM — E86.0 DEHYDRATION: Status: RESOLVED | Noted: 2022-07-19 | Resolved: 2023-06-06

## 2023-06-06 PROCEDURE — 99214 OFFICE O/P EST MOD 30 MIN: CPT | Performed by: NURSE PRACTITIONER

## 2023-06-06 RX ORDER — OFLOXACIN 3 MG/ML
2 SOLUTION/ DROPS OPHTHALMIC 4 TIMES DAILY
Qty: 3 ML | Refills: 0 | Status: SHIPPED | OUTPATIENT
Start: 2023-06-06 | End: 2023-06-09 | Stop reason: ALTCHOICE

## 2023-06-06 RX ORDER — MEDROXYPROGESTERONE ACETATE 150 MG/ML
150 INJECTION, SUSPENSION INTRAMUSCULAR ONCE
Status: COMPLETED | OUTPATIENT
Start: 2023-06-06 | End: 2023-06-06

## 2023-06-06 RX ADMIN — MEDROXYPROGESTERONE ACETATE 150 MG: 150 INJECTION, SUSPENSION INTRAMUSCULAR at 12:57

## 2023-06-06 NOTE — PROGRESS NOTES
"Chief Complaint  Follow-up (ED follow up from 6/04/2023 due to left eye infection.pt prescribed an ointment and it did not help.)    Subjective        Riri Martinez presents to [facility]  History of Present Illness  Pt presents with continued left eye conjunctival redness, left eye irritation. Pt went to ED on 6/4. Fluorescein exam was completed in the emergency department and there was no corneal abrasion. She was started on erythromycin ophthalmic ointment but states it is not helping. Took Polytrim in April which she states did resolve redness, but symptoms did return on     Objective   Vital Signs:  /63   Pulse 106   Temp 98 °F (36.7 °C) (Temporal)   Resp 16   Ht 149.9 cm (59\")   Wt 48.7 kg (107 lb 6.4 oz)   SpO2 99%   BMI 21.69 kg/m²   Estimated body mass index is 21.69 kg/m² as calculated from the following:    Height as of this encounter: 149.9 cm (59\").    Weight as of this encounter: 48.7 kg (107 lb 6.4 oz).       BMI is within normal parameters. No other follow-up for BMI required.      Physical Exam  Vitals reviewed.   Constitutional:       General: She is not in acute distress.     Appearance: Normal appearance.   HENT:      Head: Normocephalic.      Right Ear: Tympanic membrane normal.      Left Ear: Tympanic membrane normal.      Nose: Nose normal.      Mouth/Throat:      Pharynx: Oropharynx is clear. No posterior oropharyngeal erythema.   Eyes:      General: No scleral icterus.     Extraocular Movements: Extraocular movements intact.      Conjunctiva/sclera: Conjunctivae normal.      Left eye: Left conjunctiva is injected.      Pupils: Pupils are equal, round, and reactive to light.   Cardiovascular:      Rate and Rhythm: Normal rate and regular rhythm.      Pulses: Normal pulses.      Heart sounds: Normal heart sounds.   Pulmonary:      Effort: Pulmonary effort is normal.      Breath sounds: Normal breath sounds.   Abdominal:      General: Bowel " sounds are normal.      Palpations: Abdomen is soft.   Musculoskeletal:         General: Normal range of motion.      Cervical back: Neck supple.   Skin:     General: Skin is warm and dry.   Neurological:      Mental Status: She is alert and oriented to person, place, and time.   Psychiatric:         Mood and Affect: Mood normal.         Behavior: Behavior normal.         Thought Content: Thought content normal.         Judgment: Judgment normal.      Result Review :    Common labs          8/3/2022    12:34 8/4/2022    07:03 8/5/2022    07:59   Common Labs   WBC 7.78  9.91  9.49    Hemoglobin 7.3  6.4  6.3    Hematocrit 24.1  21.1  21.4    Platelets 190  183  182      Data reviewed : Recent hospitalization notes 6/4 ED visit             Assessment and Plan   Diagnoses and all orders for this visit:    1. Acute bacterial conjunctivitis of left eye (Primary)  Assessment & Plan:  Start ofloxacin 2gtt qid x 7 days   Keep clean and dry   Discussed cleaning of all surfaces, bedding, clothing to prevent reinfection  Freq hand washing   Avoid touching eyes       2. Encounter for contraceptive management, unspecified type  Comments:  depo inj given in office today  condom use during every sexual encounter    Other orders  -     ofloxacin (Ocuflox) 0.3 % ophthalmic solution; Administer 2 drops into the left eye 4 (Four) Times a Day for 7 days.  Dispense: 3 mL; Refill: 0             Follow Up   Return if symptoms worsen or fail to improve.  Patient was given instructions and counseling regarding her condition or for health maintenance advice. Please see specific information pulled into the AVS if appropriate.

## 2023-06-06 NOTE — ASSESSMENT & PLAN NOTE
Start ofloxacin 2gtt qid x 7 days   Keep clean and dry   Discussed cleaning of all surfaces, bedding, clothing to prevent reinfection  Freq hand washing   Avoid touching eyes

## 2023-06-08 ENCOUNTER — TELEPHONE (OUTPATIENT)
Dept: FAMILY MEDICINE CLINIC | Facility: CLINIC | Age: 23
End: 2023-06-08
Payer: MEDICAID

## 2023-06-08 NOTE — TELEPHONE ENCOUNTER
[facility] states ofloxacin 0.3% is on back order.  Are there any other eye drops you would like to change this to?

## 2023-06-09 RX ORDER — CIPROFLOXACIN HYDROCHLORIDE 3.5 MG/ML
1 SOLUTION/ DROPS TOPICAL 4 TIMES DAILY
Qty: 5 ML | Refills: 0 | Status: SHIPPED | OUTPATIENT
Start: 2023-06-09 | End: 2023-06-16

## 2023-06-13 ENCOUNTER — OFFICE VISIT (OUTPATIENT)
Dept: FAMILY MEDICINE CLINIC | Facility: CLINIC | Age: 23
End: 2023-06-13
Payer: MEDICAID

## 2023-06-13 VITALS
BODY MASS INDEX: 21.49 KG/M2 | SYSTOLIC BLOOD PRESSURE: 128 MMHG | HEIGHT: 59 IN | DIASTOLIC BLOOD PRESSURE: 67 MMHG | OXYGEN SATURATION: 97 % | WEIGHT: 106.6 LBS | HEART RATE: 113 BPM | TEMPERATURE: 98.9 F

## 2023-06-13 DIAGNOSIS — H10.32 ACUTE BACTERIAL CONJUNCTIVITIS OF LEFT EYE: ICD-10-CM

## 2023-06-13 DIAGNOSIS — R50.9 FEVER, UNSPECIFIED FEVER CAUSE: ICD-10-CM

## 2023-06-13 DIAGNOSIS — K08.89 PAIN, DENTAL: ICD-10-CM

## 2023-06-13 DIAGNOSIS — R68.84 JAW PAIN: Primary | ICD-10-CM

## 2023-06-13 PROCEDURE — 99214 OFFICE O/P EST MOD 30 MIN: CPT | Performed by: NURSE PRACTITIONER

## 2023-06-13 RX ORDER — AMOXICILLIN 500 MG/1
500 CAPSULE ORAL 2 TIMES DAILY
Qty: 20 CAPSULE | Refills: 0 | Status: SHIPPED | OUTPATIENT
Start: 2023-06-13 | End: 2023-06-23

## 2023-06-13 RX ORDER — OFLOXACIN 3 MG/ML
SOLUTION/ DROPS OPHTHALMIC
COMMUNITY
Start: 2023-06-09 | End: 2023-06-13 | Stop reason: ALTCHOICE

## 2023-06-13 NOTE — PROGRESS NOTES
"Chief Complaint  Jaw Pain (Right side of jaw pain, started last Friday.), Dental Pain, and Fever (Low grade fever 100.0 )    Subjective        Riri Martinez presents to [facility]  History of Present Illness  Pt presents c/o right sided jaw pain, dental pain, and fever of 100.0 x 1 day. Pt states she was told months ago that her wisdom teeth need to be removed. She was unable to get this done d/t pregnancy. States eye was slightly red this morning, but it has cleared up. She has used cipro drops as prescribed for acute conjunctivitis.     Reviewed all recent labs and medications.    Objective   Vital Signs:  /67   Pulse 113   Temp 98.9 °F (37.2 °C) (Temporal)   Ht 149.9 cm (59\")   Wt 48.4 kg (106 lb 9.6 oz)   SpO2 97%   BMI 21.53 kg/m²   Estimated body mass index is 21.53 kg/m² as calculated from the following:    Height as of this encounter: 149.9 cm (59\").    Weight as of this encounter: 48.4 kg (106 lb 9.6 oz).       BMI is within normal parameters. No other follow-up for BMI required.      Physical Exam  Vitals reviewed.   Constitutional:       General: She is not in acute distress.     Appearance: Normal appearance.   HENT:      Head: Normocephalic.      Jaw: Swelling present.      Comments: Right maxillary mild swelling      Right Ear: Tympanic membrane normal.      Left Ear: Tympanic membrane normal.      Nose: Nose normal.      Mouth/Throat:      Dentition: Abnormal dentition.      Pharynx: Oropharynx is clear. No posterior oropharyngeal erythema.   Eyes:      General: No scleral icterus.     Extraocular Movements: Extraocular movements intact.      Conjunctiva/sclera: Conjunctivae normal.      Pupils: Pupils are equal, round, and reactive to light.   Cardiovascular:      Rate and Rhythm: Normal rate and regular rhythm.      Pulses: Normal pulses.      Heart sounds: Normal heart sounds.   Pulmonary:      Effort: Pulmonary effort is normal.      Breath " sounds: Normal breath sounds.   Abdominal:      General: Bowel sounds are normal.      Palpations: Abdomen is soft.   Musculoskeletal:         General: Normal range of motion.      Cervical back: Neck supple.   Skin:     General: Skin is warm and dry.   Neurological:      Mental Status: She is alert and oriented to person, place, and time.   Psychiatric:         Mood and Affect: Mood normal.         Behavior: Behavior normal.         Thought Content: Thought content normal.         Judgment: Judgment normal.      Result Review :    Common labs          8/3/2022    12:34 8/4/2022    07:03 8/5/2022    07:59   Common Labs   WBC 7.78  9.91  9.49    Hemoglobin 7.3  6.4  6.3    Hematocrit 24.1  21.1  21.4    Platelets 190  183  182      Data reviewed : Consultant notes gyn             Assessment and Plan   Diagnoses and all orders for this visit:    1. Jaw pain (Primary)  Comments:  amox 500mg PO bid x 10 days   Advised to call dentist today   ice 20mg PO tid prn    2. Pain, dental    3. Fever, unspecified fever cause  Comments:  tyl/ibu UAD prn    4. Acute bacterial conjunctivitis of left eye  Comments:  resolving   continue cipro eye drops as prescribed   continue to refrain from touching eyes   freq hand washing    Other orders  -     amoxicillin (AMOXIL) 500 MG capsule; Take 1 capsule by mouth 2 (Two) Times a Day for 10 days.  Dispense: 20 capsule; Refill: 0             Follow Up   Return if symptoms worsen or fail to improve.  Patient was given instructions and counseling regarding her condition or for health maintenance advice. Please see specific information pulled into the AVS if appropriate.

## 2023-08-08 ENCOUNTER — OFFICE VISIT (OUTPATIENT)
Dept: OBSTETRICS AND GYNECOLOGY | Facility: CLINIC | Age: 23
End: 2023-08-08
Payer: MEDICAID

## 2023-08-08 VITALS
HEART RATE: 106 BPM | BODY MASS INDEX: 21.97 KG/M2 | WEIGHT: 109 LBS | SYSTOLIC BLOOD PRESSURE: 108 MMHG | HEIGHT: 59 IN | DIASTOLIC BLOOD PRESSURE: 70 MMHG

## 2023-08-08 DIAGNOSIS — Z30.42 ENCOUNTER FOR SURVEILLANCE OF INJECTABLE CONTRACEPTIVE: ICD-10-CM

## 2023-08-08 DIAGNOSIS — N94.10 DYSPAREUNIA IN FEMALE: Primary | ICD-10-CM

## 2023-08-08 LAB
C TRACH RRNA CVX QL NAA+PROBE: NOT DETECTED
CANDIDA SPECIES: NEGATIVE
GARDNERELLA VAGINALIS: NEGATIVE
N GONORRHOEA RRNA SPEC QL NAA+PROBE: NOT DETECTED
T VAGINALIS DNA VAG QL PROBE+SIG AMP: NEGATIVE

## 2023-08-08 PROCEDURE — 87660 TRICHOMONAS VAGIN DIR PROBE: CPT | Performed by: OBSTETRICS & GYNECOLOGY

## 2023-08-08 PROCEDURE — 87480 CANDIDA DNA DIR PROBE: CPT | Performed by: OBSTETRICS & GYNECOLOGY

## 2023-08-08 PROCEDURE — 1160F RVW MEDS BY RX/DR IN RCRD: CPT | Performed by: OBSTETRICS & GYNECOLOGY

## 2023-08-08 PROCEDURE — 87591 N.GONORRHOEAE DNA AMP PROB: CPT | Performed by: OBSTETRICS & GYNECOLOGY

## 2023-08-08 PROCEDURE — 99214 OFFICE O/P EST MOD 30 MIN: CPT | Performed by: OBSTETRICS & GYNECOLOGY

## 2023-08-08 PROCEDURE — 87491 CHLMYD TRACH DNA AMP PROBE: CPT | Performed by: OBSTETRICS & GYNECOLOGY

## 2023-08-08 PROCEDURE — 87510 GARDNER VAG DNA DIR PROBE: CPT | Performed by: OBSTETRICS & GYNECOLOGY

## 2023-08-08 PROCEDURE — 1159F MED LIST DOCD IN RCRD: CPT | Performed by: OBSTETRICS & GYNECOLOGY

## 2023-08-08 PROCEDURE — 87086 URINE CULTURE/COLONY COUNT: CPT | Performed by: OBSTETRICS & GYNECOLOGY

## 2023-08-08 RX ORDER — CHLORHEXIDINE GLUCONATE 0.12 MG/ML
RINSE ORAL
COMMUNITY
Start: 2023-06-14

## 2023-08-08 NOTE — ASSESSMENT & PLAN NOTE
The etiology of the patient's dyspareunia is unclear.  I agree that I do not think that is related to the small fibroid.  We discussed a number of possible etiologies, including urinary infection, pelvic infection, pelvic floor injury, endometriosis, pelvic adhesions, or nongynecologic etiologies including low back problems and/or conditions such as interstitial cystitis.  Today we started by collecting a vaginitis panel and cultures to rule out vaginal infection.  We also collected a urine culture.  Recommended avoiding intercourse for now.  Recommend OTC Tylenol and or ibuprofen as needed for pelvic pain.  If these are negative then I would recommend evaluation by physical therapy for pelvic floor therapy or a diagnostic laparoscopy.

## 2023-08-08 NOTE — PROGRESS NOTES
"GYN Visit    CC: Pelvic pain    HPI:   23 y.o. who presents with complaints of pelvic pain.  She initially saw one of our nurse practitioners.  A pelvic ultrasound was ordered and only showed a small uterine fibroid which was not felt to be the etiology of her symptoms.  It was recommended she follow-up with me.  The patient reports that her pain started about a year ago after the delivery of her second child.  This was a  delivery.  Pain is more on the right side.  She does not really have pain outside of having intercourse.  She reports that its painful with every episode of intercourse.  The pain starts as soon as there is insertion.  The longer the episode of intercourse the more pain she has.  No particular position makes the pain worse or better.  She denies lack of lubrication during intercourse.  She reports good desire.  There is a family history of endometriosis.      History: PMHx, Meds, Allergies, PSHx, Social Hx, and POBHx all reviewed and updated.    /70   Pulse 106   Ht 149.9 cm (59\")   Wt 49.4 kg (109 lb)   BMI 22.02 kg/mý     Physical Exam  Vitals and nursing note reviewed. Exam conducted with a chaperone present.   Constitutional:       General: She is not in acute distress.     Appearance: Normal appearance. She is not ill-appearing.   Abdominal:      General: Abdomen is flat. There is no distension.      Palpations: Abdomen is soft. There is no mass.      Tenderness: There is no abdominal tenderness. There is no guarding or rebound.      Hernia: No hernia is present.   Genitourinary:     General: Normal vulva.      Exam position: Lithotomy position.      Labia:         Right: No rash, tenderness, lesion or injury.         Left: No rash, tenderness, lesion or injury.       Urethra: No prolapse or urethral lesion.      Vagina: No signs of injury. Tenderness present. No vaginal discharge, erythema, bleeding, lesions or prolapsed vaginal walls.      Cervix: No cervical motion " tenderness, discharge, friability, lesion, erythema or cervical bleeding.      Uterus: Tender. Not deviated and not fixed.       Adnexa:         Right: Tenderness present. No mass or fullness.          Left: No mass, tenderness or fullness.        Comments: There is generalized tenderness throughout the pelvic floor.  More so on the right and anteriorly than other areas.  There are some tenderness with palpation of the uterus and right adnexa.  There are no palpable masses.  Neurological:      Mental Status: She is alert and oriented to person, place, and time.   Psychiatric:         Mood and Affect: Mood normal.         Behavior: Behavior normal.         Thought Content: Thought content normal.         Judgment: Judgment normal.       Study Result    PROCEDURE:  US NON-OB TRANSVAGINAL     COMPARISON: None     INDICATIONS:  pelvic pain since c section 10 months ago, worse on right side     TECHNIQUE:    Ultrasound examination of the pelvis was performed, using endovaginal technique.       FINDINGS:          UTERUS:           Size is 5.2 x 3.7 x 4.4 cm.  There is a 1.8 x 1.1 x 0.7 cm hypoechoic heterogeneous area in   the anterior fundal region which is likely a fibroid.  Endometrial thickness is 11 mm.    ADNEXAE:        Normal bilateral appearance with no significant masses.  Each ovary is normal in size for   a patient of this age.  Right ovary is 2.6 x 2.3 x 7.5 cm with a volume of 7.9 mL.  Left ovary is   2.6 x 2.6 x 3.2 cm with a volume of 11.3 mL.   CUL-DE-SAC:   Normal.  No fluid or mass.    OTHER:             Negative.       IMPRESSION:               1.8 x 1.1 x 0.7 cm predominantly hypoechoic anterior fundal myometrial lesion is   favored to be a fibroid.            ALESSIO FARRIS MD         Electronically Signed and Approved By: ALESSIO FARRIS MD on 6/22/2023 at 9:42          ASSESSMENT AND PLAN:  Diagnoses and all orders for this visit:    1. Dyspareunia in female (Primary)  Assessment & Plan:  The etiology  of the patient's dyspareunia is unclear.  I agree that I do not think that is related to the small fibroid.  We discussed a number of possible etiologies, including urinary infection, pelvic infection, pelvic floor injury, endometriosis, pelvic adhesions, or nongynecologic etiologies including low back problems and/or conditions such as interstitial cystitis.  Today we started by collecting a vaginitis panel and cultures to rule out vaginal infection.  We also collected a urine culture.  Recommended avoiding intercourse for now.  Recommend OTC Tylenol and or ibuprofen as needed for pelvic pain.  If these are negative then I would recommend evaluation by physical therapy for pelvic floor therapy or a diagnostic laparoscopy.    Orders:  -     Gardnerella vaginalis, Trichomonas vaginalis, Candida albicans, DNA - Swab, Vagina  -     Chlamydia trachomatis, Neisseria gonorrhoeae, PCR - Swab, Cervix  -     Urine Culture - Urine, Urine, Clean Catch; Future    2. Encounter for surveillance of injectable contraceptive  Overview:  depo inj given in office today  condom use during every sexual encounter    Assessment & Plan:  Continue Depo-Provera          Follow Up:  Return if symptoms worsen or fail to improve.      Jackson Akbar MD  08/08/2023

## 2023-08-09 LAB — BACTERIA SPEC AEROBE CULT: NO GROWTH

## 2023-08-10 ENCOUNTER — TELEPHONE (OUTPATIENT)
Dept: OBSTETRICS AND GYNECOLOGY | Facility: CLINIC | Age: 23
End: 2023-08-10
Payer: MEDICAID

## 2023-08-10 NOTE — TELEPHONE ENCOUNTER
Discussed negative results with pt. She stated she would like to procees with the diagnostic laparoscopy.

## 2023-08-10 NOTE — TELEPHONE ENCOUNTER
----- Message from Jackson Akbar MD sent at 8/9/2023  8:28 AM EDT -----  Please notify the patient that her testing was negative in the office yesterday.  We discussed some options for further evaluation including pelvic floor therapy and possibly a diagnostic laparoscopy.  Please inquire about which way the patient would like to go.

## 2023-08-21 ENCOUNTER — OFFICE VISIT (OUTPATIENT)
Dept: OBSTETRICS AND GYNECOLOGY | Facility: CLINIC | Age: 23
End: 2023-08-21
Payer: MEDICAID

## 2023-08-21 VITALS
HEART RATE: 104 BPM | DIASTOLIC BLOOD PRESSURE: 65 MMHG | HEIGHT: 59 IN | WEIGHT: 111 LBS | BODY MASS INDEX: 22.38 KG/M2 | SYSTOLIC BLOOD PRESSURE: 112 MMHG

## 2023-08-21 DIAGNOSIS — N94.10 DYSPAREUNIA IN FEMALE: Primary | ICD-10-CM

## 2023-08-21 DIAGNOSIS — Z30.42 ENCOUNTER FOR SURVEILLANCE OF INJECTABLE CONTRACEPTIVE: ICD-10-CM

## 2023-08-21 PROCEDURE — 1160F RVW MEDS BY RX/DR IN RCRD: CPT | Performed by: OBSTETRICS & GYNECOLOGY

## 2023-08-21 PROCEDURE — 99213 OFFICE O/P EST LOW 20 MIN: CPT | Performed by: OBSTETRICS & GYNECOLOGY

## 2023-08-21 PROCEDURE — 1159F MED LIST DOCD IN RCRD: CPT | Performed by: OBSTETRICS & GYNECOLOGY

## 2023-08-21 RX ORDER — SODIUM CHLORIDE 0.9 % (FLUSH) 0.9 %
10 SYRINGE (ML) INJECTION AS NEEDED
OUTPATIENT
Start: 2023-08-21

## 2023-08-21 RX ORDER — SODIUM CHLORIDE 0.9 % (FLUSH) 0.9 %
3 SYRINGE (ML) INJECTION EVERY 12 HOURS SCHEDULED
OUTPATIENT
Start: 2023-08-21

## 2023-08-21 RX ORDER — SODIUM CHLORIDE 9 MG/ML
40 INJECTION, SOLUTION INTRAVENOUS AS NEEDED
OUTPATIENT
Start: 2023-08-21

## 2023-08-21 RX ORDER — SODIUM CHLORIDE, SODIUM LACTATE, POTASSIUM CHLORIDE, CALCIUM CHLORIDE 600; 310; 30; 20 MG/100ML; MG/100ML; MG/100ML; MG/100ML
125 INJECTION, SOLUTION INTRAVENOUS CONTINUOUS
OUTPATIENT
Start: 2023-08-21

## 2023-08-21 RX ORDER — ONDANSETRON 2 MG/ML
4 INJECTION INTRAMUSCULAR; INTRAVENOUS EVERY 6 HOURS PRN
OUTPATIENT
Start: 2023-08-21

## 2023-08-21 NOTE — ASSESSMENT & PLAN NOTE
The patient has had no improvement in symptoms despite the use of Depo-Provera and over-the-counter nonsteroidal anti-inflammatories.  Symptoms occurred after a surgical procedure and there is also a family history of endometriosis.  She has declined pelvic floor therapy and desires to proceed with diagnostic laparoscopy for further evaluation of her symptoms and specifically including evaluation for pelvic adhesions and/or endometriosis.  She understands that the laparoscopy may not reveal any conclusive findings as a cause of her pelvic pain.  She may need further assessment for her pelvic pain possible further surgery for pelvic pain.  Her pelvic pain may not even be gynecologic in etiology.  The risks, benefits, and alternatives to the procedure have been reviewed the patient.  The risks included, but not limited to, infection, bleeding, hemorrhage, blood transfusion. Injury to nearby structures including: Bowel, bladder, pelvic blood vessels and nerves, ureters, and other nearby structures.  We discussed the risks of anesthesia.  The risks of postoperative complications, such as: Venous thromboembolism, myocardial infarction, stroke, and death.  The patient expressed her understanding of the risks, benefits, and alternatives to the procedure, and wishes to proceed.

## 2023-08-21 NOTE — PROGRESS NOTES
"GYN Visit    CC: Discuss surgery    HPI:   23 y.o. who presents to further discuss management of dyspareunia and pelvic pain.  After our discussion her last office visit the patient has a elected to proceed with surgical evaluation.  She has decided to decline pelvic floor therapy.  She is concerned due to the fact that her symptoms started after her last  birth, and her family history of endometriosis.  She continues to use Depo-Provera.  She has had no vaginal bleeding in 2 months.  She has had no real improvement in her symptoms with the Depo-Provera.  NSAIDs really do not help her pain as well.      History: PMHx, Meds, Allergies, PSHx, Social Hx, and POBHx all reviewed and updated.    /65   Pulse 104   Ht 149.9 cm (59\")   Wt 50.3 kg (111 lb)   Breastfeeding No   BMI 22.42 kg/mý     Physical Exam  Vitals and nursing note reviewed. Exam conducted with a chaperone present.   Constitutional:       Appearance: Normal appearance.   Abdominal:      General: Abdomen is flat. There is no distension.      Palpations: Abdomen is soft.      Tenderness: There is no abdominal tenderness. There is no guarding or rebound.      Hernia: No hernia is present.   Musculoskeletal:      Right lower leg: No edema.      Left lower leg: No edema.   Skin:     General: Skin is warm and dry.      Findings: No rash.   Neurological:      Mental Status: She is alert.   Psychiatric:         Mood and Affect: Mood normal.         Behavior: Behavior normal.         Thought Content: Thought content normal.         Judgment: Judgment normal.         ASSESSMENT AND PLAN:  Diagnoses and all orders for this visit:    1. Dyspareunia in female (Primary)  Assessment & Plan:  The patient has had no improvement in symptoms despite the use of Depo-Provera and over-the-counter nonsteroidal anti-inflammatories.  Symptoms occurred after a surgical procedure and there is also a family history of endometriosis.  She has declined pelvic " floor therapy and desires to proceed with diagnostic laparoscopy for further evaluation of her symptoms and specifically including evaluation for pelvic adhesions and/or endometriosis.  She understands that the laparoscopy may not reveal any conclusive findings as a cause of her pelvic pain.  She may need further assessment for her pelvic pain possible further surgery for pelvic pain.  Her pelvic pain may not even be gynecologic in etiology.  The risks, benefits, and alternatives to the procedure have been reviewed the patient.  The risks included, but not limited to, infection, bleeding, hemorrhage, blood transfusion. Injury to nearby structures including: Bowel, bladder, pelvic blood vessels and nerves, ureters, and other nearby structures.  We discussed the risks of anesthesia.  The risks of postoperative complications, such as: Venous thromboembolism, myocardial infarction, stroke, and death.  The patient expressed her understanding of the risks, benefits, and alternatives to the procedure, and wishes to proceed.    Orders:  -     Case Request; Standing  -     sodium chloride 0.9 % flush 3 mL  -     sodium chloride 0.9 % flush 10 mL  -     sodium chloride 0.9 % infusion 40 mL  -     lactated ringers infusion  -     ondansetron (ZOFRAN) injection 4 mg  -     ceFAZolin (ANCEF) 2,000 mg in sodium chloride 0.9 % 100 mL IVPB  -     Case Request    2. Encounter for surveillance of injectable contraceptive  Overview:  depo inj given in office today  condom use during every sexual encounter    Assessment & Plan:  Continue Depo-Provera 150 mg IM every 3 months.      Other orders  -     Follow Anesthesia Guidelines / Protocol; Future  -     Follow Anesthesia Guidelines / Protocol; Standing  -     Verify / Perform Chlorhexidine Skin Prep; Standing  -     Verify / Perform Chlorhexidine Skin Prep if Indicated (If Not Already Completed); Standing  -     Obtain Informed Consent; Future  -     Provide NPO Instructions to  Patient; Future  -     Chlorhexidine Skin Prep; Future  -     Notify Physician - Standard; Standing  -     Type & Screen; Standing  -     Insert Peripheral IV; Standing  -     Saline Lock & Maintain IV Access; Standing  -     CBC & Differential; Standing  -     hCG, Quantitative, Pregnancy; Standing        Counseling: TRACK MENSES, RTO if <q21d, >7d long, heavy or painful.    All BIRTH CONTROL options R/B/A/SE/E of each reviewed in detail.      Follow Up:  Return for after surgery.      Jackson Akbar MD  08/21/2023

## 2023-08-31 ENCOUNTER — TELEPHONE (OUTPATIENT)
Dept: FAMILY MEDICINE CLINIC | Facility: CLINIC | Age: 23
End: 2023-08-31
Payer: MEDICAID

## 2023-08-31 NOTE — TELEPHONE ENCOUNTER
Caller: Riri Martinez    Relationship: Self    Best call back number: [phone]    What is the best time to reach you: ANYTIME     Who are you requesting to speak with (clinical staff, provider,  specific staff member): CLINICAL         What was the call regarding:      THE PATIENT IS WANTING TO KNOW WHAT DAY IN SEPTEMBER SHE NEEDS TO COME IN AND GET HER DEPO SHOT    PLEASE CALL AND ADVISE

## 2023-09-01 NOTE — TELEPHONE ENCOUNTER
Left message for patient to call back.  She had her last depo shot by Dr. Akbar on 8/8/23.  She is not due for this again until 11/8/23.

## 2023-09-05 PROCEDURE — S0260 H&P FOR SURGERY: HCPCS | Performed by: OBSTETRICS & GYNECOLOGY

## 2023-09-05 NOTE — TELEPHONE ENCOUNTER
Spoke with patient, she states she DID NOT get her depo injection at Dr. Akbar's appt on 8/8/23.  She will call them to clear this up.  Last Depo injection with us was 6/6/23.  She is due again on 9/6/23.  She will come get this as a nurse visit.

## 2023-09-05 NOTE — H&P
[facility]   HISTORY AND PHYSICAL    Patient Name: Riri Martinez  : 2000  MRN: [id number]  Primary Care Physician:  Dorothy Oglesby APRN  Date of admission: 2023    Subjective   Subjective     Chief Complaint: Here for surgery    HPI:    Riri Martinez is a 23 y.o. female who presents for surgical evaluation and management of chronic pelvic pain and dyspareunia.  The patient began to have pelvic pain shortly after her  birth of her second child.  Mostly on the right side.  Most of her pain is with intercourse.  She has less pain outside of intercourse.  Reports pain starts as soon as there is insertion.  There are no particular circumstances or situations that make the pain better during intercourse.  She does report a family history of endometriosis.  She has tried medical therapies including birth control pills and NSAIDs in the past without much relief.    Review of Systems   All systems were reviewed and negative except for: Pelvic pain, pain with intercourse    Personal History     Past Medical History:   Diagnosis Date    Anemia     Anxiety     Arthritis     Chronic back pain     Depression     History of tethered spinal cord     HAD SURGICAL INTERVENTION. DENIES ANY CURRENT ISSUES    Migraine     Pelvic pain     Seasonal allergies        Past Surgical History:   Procedure Laterality Date    BACK SURGERY      tethered cord     SECTION       SECTION N/A 2022    Procedure:  SECTION REPEAT;  Surgeon: Rudy Alcantar MD;  Location: [facility] LABOR DELIVERY;  Service: Gynecology;  Laterality: N/A;    HIP SURGERY Left     HIP SURGERY      REPAIR PATELLAR TENDON Bilateral        Family History: family history includes Breast cancer in her maternal grandmother; Diabetes in her mother; Stroke in her maternal grandmother. Otherwise pertinent FHx was reviewed and not pertinent to current issue.    Social History:  reports that she quit smoking  about 6 years ago. Her smoking use included cigarettes. She started smoking about 8 years ago. She has a 0.50 pack-year smoking history. She has been exposed to tobacco smoke. She has never used smokeless tobacco. She reports that she does not drink alcohol and does not use drugs.    Home Medications:  chlorhexidine, ibuprofen, and medroxyPROGESTERone Acetate      Allergies:  Allergies   Allergen Reactions    Latex Unknown - High Severity     REPORTS HAS BEEN INSTRUCTED SINCE HX OF TETHERED SPINAL CORD TO LIST LATEX AS CONTRAINDICATION    Adhesive Tape Rash     NO ISSUES WITH PAPER TAPE       Objective   Objective     Vitals:      Physical Exam    Constitutional: Awake, alert   Eyes: PERRLA, sclerae anicteric, no conjunctival injection   HENT: NCAT, mucous membranes moist   Neck: Supple, no thyromegaly, no lymphadenopathy, trachea midline   Respiratory: Clear to auscultation bilaterally, nonlabored respirations    Cardiovascular: RRR, no murmurs, rubs, or gallops, palpable pedal pulses bilaterally   Gastrointestinal: Positive bowel sounds, soft, nontender, nondistended              Genitourinary: Normal external genitalia, vagina, and cervix.  There is significant tenderness with palpation in the right adnexal region during exam.  There are no palpable adnexal masses   Musculoskeletal: No bilateral ankle edema, no clubbing or cyanosis to extremities   Psychiatric: Appropriate affect, cooperative   Neurologic: Oriented x 3, strength symmetric in all extremities, speech clear   Skin: No rashes     Result Review    Result Review:  I have personally reviewed the results from the time of this admission to 9/7/2023 07:09 EDT and agree with these findings:  [x]  Laboratory  []  Microbiology  [x]  Radiology  []  EKG/Telemetry   []  Cardiology/Vascular   [x]  Pathology  [x]  Old records  []  Other:      Assessment & Plan   Assessment / Plan     Active Hospital Problems:  Active Hospital Problems    Diagnosis      **Dyspareunia in female     Chronic pelvic pain in female      Plan:   The patient has had no improvement in symptoms despite the use of Depo-Provera and over-the-counter nonsteroidal anti-inflammatories. Symptoms occurred after a surgical procedure and there is also a family history of endometriosis. She has declined pelvic floor therapy and desires to proceed with diagnostic laparoscopy for further evaluation of her symptoms and specifically including evaluation for pelvic adhesions and/or endometriosis. She understands that the laparoscopy may not reveal any conclusive findings as a cause of her pelvic pain. She may need further assessment for her pelvic pain possible further surgery for pelvic pain.  She may require ongoing therapy for pelvic pain.  Her pelvic pain may not even be gynecologic in etiology. The risks, benefits, and alternatives to the procedure have been reviewed the patient. The risks included, but not limited to, infection, bleeding, hemorrhage, blood transfusion. Injury to nearby structures including: Bowel, bladder, pelvic blood vessels and nerves, ureters, and other nearby structures. We discussed the risks of anesthesia. The risks of postoperative complications, such as: Venous thromboembolism, myocardial infarction, stroke, and death. The patient expressed her understanding of the risks, benefits, and alternatives to the procedure, and wishes to proceed.       Electronically signed by Jackson Akbar MD, 09/05/23, 9:20 AM EDT.

## 2023-09-06 ENCOUNTER — CLINICAL SUPPORT (OUTPATIENT)
Dept: FAMILY MEDICINE CLINIC | Facility: CLINIC | Age: 23
End: 2023-09-06
Payer: MEDICAID

## 2023-09-06 DIAGNOSIS — Z30.9 ENCOUNTER FOR CONTRACEPTIVE MANAGEMENT, UNSPECIFIED TYPE: Primary | ICD-10-CM

## 2023-09-06 RX ORDER — MEDROXYPROGESTERONE ACETATE 150 MG/ML
150 INJECTION, SUSPENSION INTRAMUSCULAR ONCE
Status: DISCONTINUED | OUTPATIENT
Start: 2023-09-06 | End: 2023-09-06

## 2023-09-06 RX ORDER — IBUPROFEN 600 MG/1
600 TABLET ORAL EVERY 6 HOURS PRN
COMMUNITY

## 2023-09-06 RX ADMIN — MEDROXYPROGESTERONE ACETATE 150 MG: 150 INJECTION, SUSPENSION INTRAMUSCULAR at 10:51

## 2023-09-06 NOTE — PRE-PROCEDURE INSTRUCTIONS
IMPORTANT INSTRUCTIONS - PRE-ADMISSION TESTING  DO NOT EAT OR CHEW anything after midnight the night before your procedure.    You may have CLEAR liquids up to ______ hours prior to ARRIVAL time. PER DR NOLAND CAN HAVE GATORADE UP TO 3 HOURS PRIOR TO ARRIVAL NO RED  Take the following medications the morning of your procedure with JUST A SIP OF WATER:  _____NONE__________________________________________________________________________________________________________________________________________________________________________________    DO NOT BRING your medications to the hospital with you, UNLESS something has changed since your PRE-Admission Testing appointment.  Hold all vitamins, supplements, and NSAIDS (Non- steroidal anti-inflammatory meds) for one week prior to surgery (you MAY take Tylenol or Acetaminophen).  If you are diabetic, check your blood sugar the morning of your procedure. If it is less than 70 or if you are feeling symptomatic, call the following number for further instructions: [phone]  Use your inhalers/nebulizers as usual, the morning of your procedure. BRING YOUR INHALERS with you.   Bring your CPAP or BIPAP to hospital, ONLY IF YOU WILL BE SPENDING THE NIGHT.   Make sure you have a ride home and have someone who will stay with you the day of your procedure after you go home.  If you have any questions, please call your Pre-Admission Testing Nurse, ___DAWANSLEY_____________ at [phone]_________.   Per anesthesia request, do not smoke for 24 hours before your procedure or as instructed by your surgeon.    BATHING INSTRUCTIONS GIVEN. NO JEWELRY DAY OF PROCEDURE. NO NAIL POLISH UPPER OR LOWER EXTREMITIES.  ENTRANCE A ELEVATOR A 3RD FLOOR DAY OF SURGERY

## 2023-09-07 ENCOUNTER — HOSPITAL ENCOUNTER (OUTPATIENT)
Facility: HOSPITAL | Age: 23
Setting detail: HOSPITAL OUTPATIENT SURGERY
Discharge: HOME | End: 2023-09-07
Attending: OBSTETRICS & GYNECOLOGY | Admitting: OBSTETRICS & GYNECOLOGY
Payer: MEDICAID

## 2023-09-07 ENCOUNTER — ANESTHESIA (OUTPATIENT)
Dept: PERIOP | Facility: HOSPITAL | Age: 23
End: 2023-09-07
Payer: MEDICAID

## 2023-09-07 ENCOUNTER — ANESTHESIA EVENT (OUTPATIENT)
Dept: PERIOP | Facility: HOSPITAL | Age: 23
End: 2023-09-07
Payer: MEDICAID

## 2023-09-07 VITALS
OXYGEN SATURATION: 100 % | BODY MASS INDEX: 22.62 KG/M2 | HEART RATE: 60 BPM | HEIGHT: 59 IN | SYSTOLIC BLOOD PRESSURE: 98 MMHG | TEMPERATURE: 96.8 F | DIASTOLIC BLOOD PRESSURE: 60 MMHG | RESPIRATION RATE: 16 BRPM | WEIGHT: 112.21 LBS

## 2023-09-07 DIAGNOSIS — N94.10 DYSPAREUNIA IN FEMALE: ICD-10-CM

## 2023-09-07 DIAGNOSIS — N80.9 ENDOMETRIOSIS DETERMINED BY LAPAROSCOPY: ICD-10-CM

## 2023-09-07 DIAGNOSIS — Z98.890 S/P LAPAROSCOPY: Primary | ICD-10-CM

## 2023-09-07 PROBLEM — R10.2 CHRONIC PELVIC PAIN IN FEMALE: Status: ACTIVE | Noted: 2023-09-07

## 2023-09-07 PROBLEM — G89.29 CHRONIC PELVIC PAIN IN FEMALE: Status: ACTIVE | Noted: 2023-09-07

## 2023-09-07 LAB
ABO GROUP BLD: NORMAL
BASOPHILS # BLD AUTO: 0.04 10*3/MM3 (ref 0–0.2)
BASOPHILS NFR BLD AUTO: 0.7 % (ref 0–1.5)
BLD GP AB SCN SERPL QL: NEGATIVE
DEPRECATED RDW RBC AUTO: 45.6 FL (ref 37–54)
EOSINOPHIL # BLD AUTO: 0.15 10*3/MM3 (ref 0–0.4)
EOSINOPHIL NFR BLD AUTO: 2.7 % (ref 0.3–6.2)
ERYTHROCYTE [DISTWIDTH] IN BLOOD BY AUTOMATED COUNT: 15.9 % (ref 12.3–15.4)
HCG INTACT+B SERPL-ACNC: <0.5 MIU/ML
HCT VFR BLD AUTO: 35.6 % (ref 34–46.6)
HGB BLD-MCNC: 11.5 G/DL (ref 12–15.9)
IMM GRANULOCYTES # BLD AUTO: 0.01 10*3/MM3 (ref 0–0.05)
IMM GRANULOCYTES NFR BLD AUTO: 0.2 % (ref 0–0.5)
LYMPHOCYTES # BLD AUTO: 1.35 10*3/MM3 (ref 0.7–3.1)
LYMPHOCYTES NFR BLD AUTO: 24.1 % (ref 19.6–45.3)
MCH RBC QN AUTO: 25.4 PG (ref 26.6–33)
MCHC RBC AUTO-ENTMCNC: 32.3 G/DL (ref 31.5–35.7)
MCV RBC AUTO: 78.6 FL (ref 79–97)
MONOCYTES # BLD AUTO: 0.48 10*3/MM3 (ref 0.1–0.9)
MONOCYTES NFR BLD AUTO: 8.6 % (ref 5–12)
NEUTROPHILS NFR BLD AUTO: 3.57 10*3/MM3 (ref 1.7–7)
NEUTROPHILS NFR BLD AUTO: 63.7 % (ref 42.7–76)
NRBC BLD AUTO-RTO: 0 /100 WBC (ref 0–0.2)
PLATELET # BLD AUTO: 217 10*3/MM3 (ref 140–450)
PMV BLD AUTO: 10.1 FL (ref 6–12)
RBC # BLD AUTO: 4.53 10*6/MM3 (ref 3.77–5.28)
RH BLD: POSITIVE
T&S EXPIRATION DATE: NORMAL
WBC NRBC COR # BLD: 5.6 10*3/MM3 (ref 3.4–10.8)

## 2023-09-07 PROCEDURE — 88305 TISSUE EXAM BY PATHOLOGIST: CPT | Performed by: OBSTETRICS & GYNECOLOGY

## 2023-09-07 PROCEDURE — 86901 BLOOD TYPING SEROLOGIC RH(D): CPT | Performed by: OBSTETRICS & GYNECOLOGY

## 2023-09-07 PROCEDURE — 86850 RBC ANTIBODY SCREEN: CPT | Performed by: OBSTETRICS & GYNECOLOGY

## 2023-09-07 PROCEDURE — 25010000002 ONDANSETRON PER 1 MG: Performed by: NURSE ANESTHETIST, CERTIFIED REGISTERED

## 2023-09-07 PROCEDURE — 25010000002 KETOROLAC TROMETHAMINE PER 15 MG: Performed by: NURSE ANESTHETIST, CERTIFIED REGISTERED

## 2023-09-07 PROCEDURE — 86900 BLOOD TYPING SEROLOGIC ABO: CPT | Performed by: OBSTETRICS & GYNECOLOGY

## 2023-09-07 PROCEDURE — 25010000002 FENTANYL CITRATE (PF) 50 MCG/ML SOLUTION: Performed by: NURSE ANESTHETIST, CERTIFIED REGISTERED

## 2023-09-07 PROCEDURE — 25010000002 MIDAZOLAM PER 1MG: Performed by: ANESTHESIOLOGY

## 2023-09-07 PROCEDURE — 25010000002 DEXAMETHASONE PER 1 MG: Performed by: NURSE ANESTHETIST, CERTIFIED REGISTERED

## 2023-09-07 PROCEDURE — 84702 CHORIONIC GONADOTROPIN TEST: CPT | Performed by: OBSTETRICS & GYNECOLOGY

## 2023-09-07 PROCEDURE — 58662 LAPAROSCOPY EXCISE LESIONS: CPT | Performed by: OBSTETRICS & GYNECOLOGY

## 2023-09-07 PROCEDURE — 25010000002 SUGAMMADEX 200 MG/2ML SOLUTION: Performed by: NURSE ANESTHETIST, CERTIFIED REGISTERED

## 2023-09-07 PROCEDURE — 85025 COMPLETE CBC W/AUTO DIFF WBC: CPT | Performed by: OBSTETRICS & GYNECOLOGY

## 2023-09-07 PROCEDURE — 25010000002 CEFAZOLIN IN DEXTROSE 2000 MG/ 100 ML SOLUTION: Performed by: OBSTETRICS & GYNECOLOGY

## 2023-09-07 PROCEDURE — 25010000002 PROPOFOL 10 MG/ML EMULSION: Performed by: NURSE ANESTHETIST, CERTIFIED REGISTERED

## 2023-09-07 RX ORDER — MEPERIDINE HYDROCHLORIDE 25 MG/ML
12.5 INJECTION INTRAMUSCULAR; INTRAVENOUS; SUBCUTANEOUS
Status: DISCONTINUED | OUTPATIENT
Start: 2023-09-07 | End: 2023-09-07 | Stop reason: HOSPADM

## 2023-09-07 RX ORDER — LIDOCAINE HYDROCHLORIDE 20 MG/ML
INJECTION, SOLUTION EPIDURAL; INFILTRATION; INTRACAUDAL; PERINEURAL AS NEEDED
Status: DISCONTINUED | OUTPATIENT
Start: 2023-09-07 | End: 2023-09-07 | Stop reason: SURG

## 2023-09-07 RX ORDER — PROMETHAZINE HYDROCHLORIDE 25 MG/1
25 SUPPOSITORY RECTAL ONCE AS NEEDED
Status: DISCONTINUED | OUTPATIENT
Start: 2023-09-07 | End: 2023-09-07 | Stop reason: HOSPADM

## 2023-09-07 RX ORDER — OXYCODONE HYDROCHLORIDE AND ACETAMINOPHEN 5; 325 MG/1; MG/1
1 TABLET ORAL EVERY 4 HOURS PRN
Status: DISCONTINUED | OUTPATIENT
Start: 2023-09-07 | End: 2023-09-07 | Stop reason: HOSPADM

## 2023-09-07 RX ORDER — ROCURONIUM BROMIDE 10 MG/ML
INJECTION, SOLUTION INTRAVENOUS AS NEEDED
Status: DISCONTINUED | OUTPATIENT
Start: 2023-09-07 | End: 2023-09-07 | Stop reason: SURG

## 2023-09-07 RX ORDER — ONDANSETRON 2 MG/ML
4 INJECTION INTRAMUSCULAR; INTRAVENOUS EVERY 6 HOURS PRN
Status: DISCONTINUED | OUTPATIENT
Start: 2023-09-07 | End: 2023-09-07 | Stop reason: HOSPADM

## 2023-09-07 RX ORDER — SCOLOPAMINE TRANSDERMAL SYSTEM 1 MG/1
1 PATCH, EXTENDED RELEASE TRANSDERMAL ONCE
Status: DISCONTINUED | OUTPATIENT
Start: 2023-09-07 | End: 2023-09-07 | Stop reason: HOSPADM

## 2023-09-07 RX ORDER — SODIUM CHLORIDE 0.9 % (FLUSH) 0.9 %
10 SYRINGE (ML) INJECTION AS NEEDED
Status: DISCONTINUED | OUTPATIENT
Start: 2023-09-07 | End: 2023-09-07 | Stop reason: HOSPADM

## 2023-09-07 RX ORDER — PROMETHAZINE HYDROCHLORIDE 12.5 MG/1
25 TABLET ORAL ONCE AS NEEDED
Status: DISCONTINUED | OUTPATIENT
Start: 2023-09-07 | End: 2023-09-07 | Stop reason: HOSPADM

## 2023-09-07 RX ORDER — BUPIVACAINE HYDROCHLORIDE AND EPINEPHRINE 5; 5 MG/ML; UG/ML
INJECTION, SOLUTION EPIDURAL; INTRACAUDAL; PERINEURAL AS NEEDED
Status: DISCONTINUED | OUTPATIENT
Start: 2023-09-07 | End: 2023-09-07 | Stop reason: HOSPADM

## 2023-09-07 RX ORDER — SODIUM CHLORIDE, SODIUM LACTATE, POTASSIUM CHLORIDE, CALCIUM CHLORIDE 600; 310; 30; 20 MG/100ML; MG/100ML; MG/100ML; MG/100ML
9 INJECTION, SOLUTION INTRAVENOUS CONTINUOUS PRN
Status: DISCONTINUED | OUTPATIENT
Start: 2023-09-07 | End: 2023-09-07 | Stop reason: HOSPADM

## 2023-09-07 RX ORDER — SODIUM CHLORIDE 0.9 % (FLUSH) 0.9 %
3 SYRINGE (ML) INJECTION EVERY 12 HOURS SCHEDULED
Status: DISCONTINUED | OUTPATIENT
Start: 2023-09-07 | End: 2023-09-07 | Stop reason: HOSPADM

## 2023-09-07 RX ORDER — CEFAZOLIN SODIUM 2 G/100ML
2000 INJECTION, SOLUTION INTRAVENOUS ONCE
Status: COMPLETED | OUTPATIENT
Start: 2023-09-07 | End: 2023-09-07

## 2023-09-07 RX ORDER — MIDAZOLAM HYDROCHLORIDE 2 MG/2ML
2 INJECTION, SOLUTION INTRAMUSCULAR; INTRAVENOUS ONCE
Status: COMPLETED | OUTPATIENT
Start: 2023-09-07 | End: 2023-09-07

## 2023-09-07 RX ORDER — ONDANSETRON 2 MG/ML
INJECTION INTRAMUSCULAR; INTRAVENOUS AS NEEDED
Status: DISCONTINUED | OUTPATIENT
Start: 2023-09-07 | End: 2023-09-07 | Stop reason: SURG

## 2023-09-07 RX ORDER — FENTANYL CITRATE 50 UG/ML
INJECTION, SOLUTION INTRAMUSCULAR; INTRAVENOUS AS NEEDED
Status: DISCONTINUED | OUTPATIENT
Start: 2023-09-07 | End: 2023-09-07 | Stop reason: SURG

## 2023-09-07 RX ORDER — MAGNESIUM HYDROXIDE 1200 MG/15ML
LIQUID ORAL AS NEEDED
Status: DISCONTINUED | OUTPATIENT
Start: 2023-09-07 | End: 2023-09-07 | Stop reason: HOSPADM

## 2023-09-07 RX ORDER — HYDROCODONE BITARTRATE AND ACETAMINOPHEN 5; 325 MG/1; MG/1
1-2 TABLET ORAL EVERY 6 HOURS PRN
Qty: 15 TABLET | Refills: 0 | Status: SHIPPED | OUTPATIENT
Start: 2023-09-07

## 2023-09-07 RX ORDER — KETOROLAC TROMETHAMINE 30 MG/ML
INJECTION, SOLUTION INTRAMUSCULAR; INTRAVENOUS AS NEEDED
Status: DISCONTINUED | OUTPATIENT
Start: 2023-09-07 | End: 2023-09-07 | Stop reason: SURG

## 2023-09-07 RX ORDER — PROPOFOL 10 MG/ML
VIAL (ML) INTRAVENOUS AS NEEDED
Status: DISCONTINUED | OUTPATIENT
Start: 2023-09-07 | End: 2023-09-07 | Stop reason: SURG

## 2023-09-07 RX ORDER — ONDANSETRON 2 MG/ML
4 INJECTION INTRAMUSCULAR; INTRAVENOUS ONCE AS NEEDED
Status: DISCONTINUED | OUTPATIENT
Start: 2023-09-07 | End: 2023-09-07 | Stop reason: HOSPADM

## 2023-09-07 RX ORDER — ACETAMINOPHEN 500 MG
1000 TABLET ORAL ONCE
Status: COMPLETED | OUTPATIENT
Start: 2023-09-07 | End: 2023-09-07

## 2023-09-07 RX ORDER — SODIUM CHLORIDE, SODIUM LACTATE, POTASSIUM CHLORIDE, CALCIUM CHLORIDE 600; 310; 30; 20 MG/100ML; MG/100ML; MG/100ML; MG/100ML
125 INJECTION, SOLUTION INTRAVENOUS CONTINUOUS
Status: DISCONTINUED | OUTPATIENT
Start: 2023-09-07 | End: 2023-09-07 | Stop reason: HOSPADM

## 2023-09-07 RX ORDER — DEXAMETHASONE SODIUM PHOSPHATE 4 MG/ML
INJECTION, SOLUTION INTRA-ARTICULAR; INTRALESIONAL; INTRAMUSCULAR; INTRAVENOUS; SOFT TISSUE AS NEEDED
Status: DISCONTINUED | OUTPATIENT
Start: 2023-09-07 | End: 2023-09-07 | Stop reason: SURG

## 2023-09-07 RX ORDER — SODIUM CHLORIDE 9 MG/ML
40 INJECTION, SOLUTION INTRAVENOUS AS NEEDED
Status: DISCONTINUED | OUTPATIENT
Start: 2023-09-07 | End: 2023-09-07 | Stop reason: HOSPADM

## 2023-09-07 RX ORDER — GLYCOPYRROLATE 0.2 MG/ML
INJECTION INTRAMUSCULAR; INTRAVENOUS AS NEEDED
Status: DISCONTINUED | OUTPATIENT
Start: 2023-09-07 | End: 2023-09-07 | Stop reason: SURG

## 2023-09-07 RX ADMIN — MIDAZOLAM HYDROCHLORIDE 2 MG: 1 INJECTION, SOLUTION INTRAMUSCULAR; INTRAVENOUS at 08:18

## 2023-09-07 RX ADMIN — LIDOCAINE HYDROCHLORIDE 10 MG: 20 INJECTION, SOLUTION EPIDURAL; INFILTRATION; INTRACAUDAL; PERINEURAL at 09:35

## 2023-09-07 RX ADMIN — ACETAMINOPHEN 1000 MG: 500 TABLET ORAL at 08:18

## 2023-09-07 RX ADMIN — CEFAZOLIN SODIUM 2000 MG: 2 INJECTION, SOLUTION INTRAVENOUS at 08:53

## 2023-09-07 RX ADMIN — ONDANSETRON 4 MG: 2 INJECTION INTRAMUSCULAR; INTRAVENOUS at 09:10

## 2023-09-07 RX ADMIN — FENTANYL CITRATE 100 MCG: 50 INJECTION, SOLUTION INTRAMUSCULAR; INTRAVENOUS at 08:57

## 2023-09-07 RX ADMIN — ROCURONIUM BROMIDE 60 MG: 50 INJECTION INTRAVENOUS at 08:55

## 2023-09-07 RX ADMIN — DEXAMETHASONE SODIUM PHOSPHATE 8 MG: 4 INJECTION, SOLUTION INTRAMUSCULAR; INTRAVENOUS at 09:10

## 2023-09-07 RX ADMIN — GLYCOPYRROLATE 0.2 MG: 0.2 INJECTION INTRAMUSCULAR; INTRAVENOUS at 08:54

## 2023-09-07 RX ADMIN — KETOROLAC TROMETHAMINE 30 MG: 30 INJECTION, SOLUTION INTRAMUSCULAR; INTRAVENOUS at 09:10

## 2023-09-07 RX ADMIN — SUGAMMADEX 200 MG: 100 INJECTION, SOLUTION INTRAVENOUS at 10:00

## 2023-09-07 RX ADMIN — SCOPOLAMINE 1 PATCH: 1.5 PATCH, EXTENDED RELEASE TRANSDERMAL at 08:18

## 2023-09-07 RX ADMIN — LIDOCAINE HYDROCHLORIDE 50 MG: 20 INJECTION, SOLUTION EPIDURAL; INFILTRATION; INTRACAUDAL; PERINEURAL at 08:52

## 2023-09-07 RX ADMIN — PROPOFOL 200 MG: 10 INJECTION, EMULSION INTRAVENOUS at 08:52

## 2023-09-07 RX ADMIN — SODIUM CHLORIDE, POTASSIUM CHLORIDE, SODIUM LACTATE AND CALCIUM CHLORIDE 9 ML/HR: 600; 310; 30; 20 INJECTION, SOLUTION INTRAVENOUS at 08:18

## 2023-09-07 NOTE — ANESTHESIA PREPROCEDURE EVALUATION
Anesthesia Evaluation     Patient summary reviewed and Nursing notes reviewed   no history of anesthetic complications:   NPO Solid Status: > 8 hours  NPO Liquid Status: > 2 hours           Airway   Mallampati: II  TM distance: >3 FB  Neck ROM: full  No difficulty expected  Dental      Pulmonary - negative pulmonary ROS and normal exam    breath sounds clear to auscultation  Cardiovascular - negative cardio ROS and normal exam  Exercise tolerance: good (4-7 METS)    Rhythm: regular  Rate: normal        Neuro/Psych  (+) headaches, psychiatric history Depression  GI/Hepatic/Renal/Endo - negative ROS     Musculoskeletal     Abdominal    Substance History - negative use     OB/GYN negative ob/gyn ROS         Other   arthritis,     ROS/Med Hx Other: PAT Nursing Notes unavailable.                 Anesthesia Plan    ASA 2     general     (Patient understands anesthesia not responsible for dental damage.)  intravenous induction     Anesthetic plan, risks, benefits, and alternatives have been provided, discussed and informed consent has been obtained with: patient.  Pre-procedure education provided  Plan discussed with CRNA.    CODE STATUS:

## 2023-09-07 NOTE — ANESTHESIA POSTPROCEDURE EVALUATION
Patient: Riri Martinez    Procedure Summary       Date: 09/07/23 Room / Location: [facility] OR 05 / [facility] MAIN OR    Anesthesia Start: 0851 Anesthesia Stop: 1016    Procedure: DIAGNOSTIC LAPAROSCOPY WITH EXCISION OF ENDOMETRIOSIS (Abdomen) Diagnosis:       Dyspareunia in female      Endometriosis determined by laparoscopy      (Dyspareunia in female [N94.10])    Surgeons: Jackson Akbar MD Provider: Matthew Coppola MD    Anesthesia Type: general ASA Status: 2            Anesthesia Type: general    Vitals  Vitals Value Taken Time   /53 09/07/23 1040   Temp 36.6 °C (97.8 °F) 09/07/23 1040   Pulse 70 09/07/23 1041   Resp 16 09/07/23 1040   SpO2 100 % 09/07/23 1041   Vitals shown include unvalidated device data.        Post Anesthesia Care and Evaluation    Patient location during evaluation: bedside  Patient participation: complete - patient participated  Level of consciousness: awake  Pain management: adequate    Airway patency: patent  PONV Status: none  Cardiovascular status: acceptable and stable  Respiratory status: acceptable  Hydration status: acceptable    Comments: An Anesthesiologist personally participated in the most demanding procedures (including induction and emergence if applicable) in the anesthesia plan, monitored the course of anesthesia administration at frequent intervals and remained physically present and available for immediate diagnosis and treatment of emergencies.

## 2023-09-07 NOTE — DISCHARGE INSTRUCTIONS
No driving while taking narcotic pain medications  No lifting more than 15 to 20 pounds for 1 to 2 weeks  No intercourse until follow-up  Keep the incisions clean and dry  Remove outer bandage 24 hours after surgery  Remove inner bandage/Steri-Strips 1 week from surgery  Call for temperature greater than 100 °F, shortness of breath or chest pain, heavy vaginal bleeding soaking a pad in less than 1 hour, redness swelling or drainage from the incisions, excessive nausea or vomiting, or pain that is worsening despite current pain medications     DISCHARGE INSTRUCTIONS  GYNECOLOGICAL  PROCEDURES      For your surgery you had:  General anesthesia (you may have a sore throat for the first 24 hours)  IV sedation  Local anesthesia  Monitored anesthesia care  You received a medicated patch for nausea prevention today (behind your ear). It is recommended that you remove it 24-48 hours post-operatively. It must be removed within 72 hours.  You received an anesthesia medication today that can cause hormonal forms of birth control to be ineffective. You should use a different form of birth control (to prevent pregnancy) for 7 days.   You may experience dizziness, drowsiness, or lightheadedness for several hours following surgery.  Do not stay alone today or tonight.  Limit your activity for 24 hours.  Resume your diet slowly.  Follow any special dietary instructions you may have been given by your doctor.  You should not drive or operate machinery, drink alcohol, or sign legally binding documents for 24 hours or while you are taking pain medication.  Last dose of pain medication was given at: tylenol 1000 mg at 8:18 am- may take next dose tylenol at 12:18 pm or later-do not mix tylenol with norco-do not exceed 4000 mg of tylenol (acetaminophen ) in 24 hours, Toradol 30 mg at 9:10 am-may take ibuprofen (motrin) in needed at 3:10 pm or later .  NOTIFY YOUR DOCTOR IF YOU EXPERIENCE ANY OF THE FOLLOWING:  Temperature greater than  101 degrees Fahrenheit  Shaking Chills  Redness or excessive drainage from incision  Nausea, vomiting and/or pain that is not controlled by prescribed medications  Increase in bleeding or bleeding that is excessive  Unable to urinate in 6 hours after surgery  If unable to reach your doctor, please go to the closest Emergency Room [] Remove dressing in:     [] You may resume intercourse and the use of tampons as your physician has instructed you.  [] Nothing in the vagina for 2 weeks to include intercourse, douches, or tampons.  [] Vaginal bleeding may be expected for several days with flow decreasing with time and never any heavier than a normal   period.    If you have foul smelling discharge, notify your physician.  Medications per physician instructions as indicated on Discharge Medication Information Sheet.  You should see   for follow-up care   on   .  Phone number:      SPECIAL INSTRUCTIONS:

## 2023-09-07 NOTE — OP NOTE
DIAGNOSTIC LAPAROSCOPY  Procedure Report    Patient Name:  Riri Martinez  YOB: 2000    Date of Surgery:  9/7/2023     Pre-op Diagnosis:   Dyspareunia in female [N94.10]       Post-Op Diagnosis Codes:     * Dyspareunia in female [N94.10]    Procedure(s):  DIAGNOSTIC LAPAROSCOPY WITH EXCISION OF ENDOMETRIOSIS    Staff:  Surgeon(s):  Jackson Akbar MD    Assistant: Mable Cohn RN CSA    Anesthesia: General    Estimated Blood Loss: 20 mL      Specimen:          Specimens       ID Source Type Tests Collected By Collected At Frozen?    A Peritoneum Tissue TISSUE PATHOLOGY EXAM   Jackson Akbar MD 9/7/23 0941 No    Description: PELVIC PERITONEUM TISSUE                Findings: The uterus was normal size and shape.  Both ovaries and fallopian tubes appeared normal.  There were some adhesions of the bladder peritoneum to the anterior lower segment of the uterus.  There was some family adhesions of the sigmoid colon to the left pelvic sidewall below the left ovary.  There were endometriosis implants in the posterior cul-de-sac and along the left uterosacral ligament.    Complications: None    Description of Procedure: After reviewing the informed consent including the risks benefits and alternatives to the procedure, the patient expressed her understanding and desire to proceed.  She was taken to the operating room with an IV in place and running.  She was placed on the operating table in the dorsal supine position.  The patient was given a general anesthetic, and intubated endotracheally.  The patient was then repositioned into the dorsal lithotomy position.  Her arms were tucked to the side, and her legs were placed in yellowfin stirrups.  We took care in positioning both the arms and legs, padding any potential pressure points.  The bladder was drained in a sterile fashion with an in and out catheterization. After a surgical time-out was performed, the patient was prepped and  draped in the usual sterile fashion.  I inserted a Lobo retractor into the posterior vagina, and a Sandy retractor and the anterior vagina.  I used a single-tooth tenaculum grasped the anterior lip of the cervix, and a Hulka tenaculum was placed without difficulty.  The single-tooth tenaculum was removed.  The Lobo and Sandy retractors were removed.  I then changed my gloves, and proceeded to the abdomen.    After ensuring the patient was flat, and had an orogastric tube in place, I infiltrated in the planned incision sites with a total of 10mL of half percent Marcaine with epinephrine.  I made a vertical infraumbilical skin incision with the scalpel.  With anterior traction on the anterior abdominal wall I inserted the Veress needle into the peritoneal cavity without difficulty.  Free-flowing saline through the Veress needle confirmed my intraperitoneal location.  Opening pressures were less than 7 mm of mercury.  The abdomen was insufflated to 25 mm of mercury. I then used a 5 mm Optiview trocar to enter the peritoneal cavity under direct visualization.      I then surveyed the area below the trocar insertion site, and there was no evidence of any bowel or vascular injury.  At this time I placed a 5 mm trocar in the left lower quadrant, under direct visualization.  The intra-abdominal pressure were reduced to 15 mm of mercury.  The patient was placed in Trendelenburg position, and the bowel was evacuated out of the pelvis with a blunt probe.    The uterus was grossly normal in size and shape.  Both fallopian tubes and ovaries appeared normal.  There were some adhesions of the bladder peritoneum to the anterior lower uterine segment.  The sigmoid colon was adhered to the left pelvic sidewall with some filmy adhesions distal to the ovary.  There were endometriosis implants in the posterior cul-de-sac and along the left uterosacral ligament.  I placed a second 5 mm trocar in the left lower quadrant.  I called for  the laparoscopic Metzenbaum scissors as well as the Maryland grasper.  I identified the course of the ureter through both sides of the pelvis.  I tented the peritoneum away from the pelvic sidewall and the posterior cul-de-sac.  The nodules were well away from the colon.  I was able to excise the nodule completely on both sides.  I used short burst of cautery to achieve hemostasis.  I used the cautery function of the Metzenbaum scissors on the left uterosacral ligament to destroy this endometriosis which was much more widespread and less defined.  I then took down the adhesions of the bladder peritoneum to the anterior lower uterine segment without difficulty.  These were all filmy.  I mobilized the colon away from the left pelvic sidewall.  Once again, these adhesions were filmy.    This completed the my procedure.  The left lower quadrant trocars were removed under direct visualization.  The camera was removed, and the pneumoperitoneum was evacuated from the patient's abdomen.  Once the pneumoperitoneum was evacuated, the trocar cannula was removed.  The skin sites were then reapproximated with simple, interrupted, subcuticular 4-0 Monocryl sutures.  The incisions were then reinforced with Mastisol and Steri-Strips, and covered with a coverlet.  The Hulka tenaculum was removed, and the tenaculum site was noted to be hemostatic.  The patient was taken out of lithotomy position, awoken from her general anesthesia, and taken to the recovery room in satisfactory condition.  All counts were correct x2, and the patient received as her preoperative antibiotics.  The patient will be discharged home which she meets PACU criteria.  The patient will follow up with me in approximately 2 weeks, and this appointment will be made for her prior to discharge.  For discharge medications please see the medication reconciliation sheet.  The patient was instructed to call the office for any of the following: Temperature greater than  100°F, shortness of breath or chest pain, pain that is worsening despite current pain medications, excessive nausea or vomiting, redness swelling or drainage from the incisions, heavy vaginal bleeding, or other concerns.    Assistant: Mable Cohn RN CSA  was responsible for performing the following activities: Retraction, Suction, Irrigation, Suturing, Closing, Placing Dressing, and Held/Positioned Camera and their skilled assistance was necessary for the success of this case.    Jackson Akbar MD     Date: 9/7/2023  Time: 10:13 EDT

## 2023-09-08 LAB
CYTO UR: NORMAL
LAB AP CASE REPORT: NORMAL
LAB AP CLINICAL INFORMATION: NORMAL
PATH REPORT.FINAL DX SPEC: NORMAL
PATH REPORT.GROSS SPEC: NORMAL

## 2023-09-11 ENCOUNTER — TELEPHONE (OUTPATIENT)
Dept: OBSTETRICS AND GYNECOLOGY | Facility: CLINIC | Age: 23
End: 2023-09-11
Payer: MEDICAID

## 2023-09-11 NOTE — TELEPHONE ENCOUNTER
Provider: DR. NOLAND    Caller: KAT VEGA    Relationship to Patient: SELF    Pharmacy:     Phone Number: [phone]    Reason for Call: PT HAD SURGERY ON THURS 9-7, NOW HAD VERY ITCHY RED BUMPS @ INCISIONS. NO PRIORITY SYMPTOMS.    When was the patient last seen: 09-08

## 2023-09-11 NOTE — TELEPHONE ENCOUNTER
Spoke to patient and area is where she had bandages. She does admit she has an adhesive sensitivity/allergy and went ahead and removed the bandages that were still placed. She was advised to remove any bandage that may be left and take benadryl to calm down the reaction. She will monitor for any reaction and if any SOA or swelling noted to go to the ER for evaluation.

## 2023-09-19 NOTE — PROGRESS NOTES
"Post Operative Visit      CC: Post operative follow up    HPI:   Pain:  No  Vaginal bleeding:  No  Vaginal discharge:  No  Fever/chills:  No  Good appetite:  Yes  Normal bladder function:  Yes  Normal bowel function:  Yes  Hot flashes: No    Operative report, surgical findings and any pathology reviewed.    /70   Pulse 104   Ht 149.9 cm (59\")   Wt 49.9 kg (110 lb)   Breastfeeding No   BMI 22.22 kg/m²     Physical Exam  Vitals and nursing note reviewed. Exam conducted with a chaperone present.   Constitutional:       General: She is not in acute distress.     Appearance: Normal appearance. She is not ill-appearing.   Abdominal:      General: Abdomen is flat. There is no distension.      Palpations: Abdomen is soft. There is no mass.      Tenderness: There is no abdominal tenderness. There is no guarding or rebound.      Hernia: No hernia is present.      Comments: The incisions are clean, dry, intact and well-healing.  There are no signs of infection.   Musculoskeletal:         General: No swelling.      Right lower leg: No edema.      Left lower leg: No edema.   Skin:     General: Skin is warm and dry.      Findings: No rash.   Neurological:      Mental Status: She is alert and oriented to person, place, and time.   Psychiatric:         Mood and Affect: Mood normal.         Behavior: Behavior normal.         Thought Content: Thought content normal.         Judgment: Judgment normal.         ASSESSMENT:  Post-operative exam    Diagnoses and all orders for this visit:    1. Postoperative visit (Primary)  Assessment & Plan:  Stable postoperative course  May resume all normal activities  Continue OTC NSAIDs for any further postoperative pain      2. Chronic pelvic pain in female    3. Endometriosis determined by laparoscopy  Assessment & Plan:  Discussed findings at surgery.  Endometriosis was confirmed by pathology.  This is likely the patient's etiology of her pelvic pain.  We discussed options for " management.  Currently the patient is taking Depo-Provera, and she wishes to continue this.  We discussed that if symptoms worsen we can consider alternative medical therapy such as Depo-Lupron.      4. Dyspareunia in female          PLAN:    Any available photos and/or pathology were reviewed.  All questions answered.     Ok to resume normal activities  Ok to resume intercourse  Return to school/work without limitations    Counseling: TRACK MENSES, RTO if <q21 days or >q3mo (frequent or infrequent), >7d long, heavy or painful.        Follow Up:  Return in about 3 months (around 12/20/2023) for Recheck.      Jackson Akbar MD  09/20/2023

## 2023-09-20 ENCOUNTER — OFFICE VISIT (OUTPATIENT)
Dept: OBSTETRICS AND GYNECOLOGY | Facility: CLINIC | Age: 23
End: 2023-09-20
Payer: MEDICAID

## 2023-09-20 VITALS
DIASTOLIC BLOOD PRESSURE: 70 MMHG | SYSTOLIC BLOOD PRESSURE: 113 MMHG | HEIGHT: 59 IN | HEART RATE: 104 BPM | WEIGHT: 110 LBS | BODY MASS INDEX: 22.18 KG/M2

## 2023-09-20 DIAGNOSIS — N94.10 DYSPAREUNIA IN FEMALE: ICD-10-CM

## 2023-09-20 DIAGNOSIS — N80.9 ENDOMETRIOSIS DETERMINED BY LAPAROSCOPY: ICD-10-CM

## 2023-09-20 DIAGNOSIS — Z48.89 POSTOPERATIVE VISIT: Primary | ICD-10-CM

## 2023-09-20 DIAGNOSIS — G89.29 CHRONIC PELVIC PAIN IN FEMALE: ICD-10-CM

## 2023-09-20 DIAGNOSIS — R10.2 CHRONIC PELVIC PAIN IN FEMALE: ICD-10-CM

## 2023-09-20 PROCEDURE — 1159F MED LIST DOCD IN RCRD: CPT | Performed by: OBSTETRICS & GYNECOLOGY

## 2023-09-20 PROCEDURE — 1160F RVW MEDS BY RX/DR IN RCRD: CPT | Performed by: OBSTETRICS & GYNECOLOGY

## 2023-09-20 PROCEDURE — 99024 POSTOP FOLLOW-UP VISIT: CPT | Performed by: OBSTETRICS & GYNECOLOGY

## 2023-09-21 PROBLEM — Z48.89 POSTOPERATIVE VISIT: Status: ACTIVE | Noted: 2023-09-21

## 2023-09-21 NOTE — ASSESSMENT & PLAN NOTE
Discussed findings at surgery.  Endometriosis was confirmed by pathology.  This is likely the patient's etiology of her pelvic pain.  We discussed options for management.  Currently the patient is taking Depo-Provera, and she wishes to continue this.  We discussed that if symptoms worsen we can consider alternative medical therapy such as Depo-Lupron.  
Stable postoperative course  May resume all normal activities  Continue OTC NSAIDs for any further postoperative pain  
negative...

## 2023-11-28 ENCOUNTER — OFFICE VISIT (OUTPATIENT)
Dept: FAMILY MEDICINE CLINIC | Facility: CLINIC | Age: 23
End: 2023-11-28
Payer: MEDICAID

## 2023-11-28 VITALS
SYSTOLIC BLOOD PRESSURE: 121 MMHG | BODY MASS INDEX: 23.79 KG/M2 | HEIGHT: 59 IN | TEMPERATURE: 97.5 F | OXYGEN SATURATION: 100 % | HEART RATE: 107 BPM | DIASTOLIC BLOOD PRESSURE: 61 MMHG | WEIGHT: 118 LBS

## 2023-11-28 DIAGNOSIS — R05.9 COUGH, UNSPECIFIED TYPE: ICD-10-CM

## 2023-11-28 DIAGNOSIS — Z30.42 ENCOUNTER FOR SURVEILLANCE OF INJECTABLE CONTRACEPTIVE: ICD-10-CM

## 2023-11-28 DIAGNOSIS — J06.9 UPPER RESPIRATORY TRACT INFECTION, UNSPECIFIED TYPE: ICD-10-CM

## 2023-11-28 DIAGNOSIS — J02.9 SORETHROAT: Primary | ICD-10-CM

## 2023-11-28 DIAGNOSIS — R52 GENERALIZED BODY ACHES: ICD-10-CM

## 2023-11-28 PROBLEM — Z48.89 POSTOPERATIVE VISIT: Status: RESOLVED | Noted: 2023-09-21 | Resolved: 2023-11-28

## 2023-11-28 PROBLEM — H10.32 ACUTE BACTERIAL CONJUNCTIVITIS OF LEFT EYE: Status: RESOLVED | Noted: 2023-06-06 | Resolved: 2023-11-28

## 2023-11-28 LAB
EXPIRATION DATE: NORMAL
EXPIRATION DATE: NORMAL
FLUAV AG UPPER RESP QL IA.RAPID: NOT DETECTED
FLUBV AG UPPER RESP QL IA.RAPID: NOT DETECTED
INTERNAL CONTROL: NORMAL
INTERNAL CONTROL: NORMAL
Lab: 6893
Lab: 8208
S PYO AG THROAT QL: NEGATIVE
SARS-COV-2 AG UPPER RESP QL IA.RAPID: NOT DETECTED

## 2023-11-28 RX ORDER — IBUPROFEN 600 MG/1
600 TABLET ORAL EVERY 6 HOURS PRN
Qty: 40 TABLET | Refills: 5 | Status: SHIPPED | OUTPATIENT
Start: 2023-11-28

## 2023-11-28 RX ORDER — METHYLPREDNISOLONE 4 MG/1
TABLET ORAL
Qty: 21 TABLET | Refills: 0 | Status: SHIPPED | OUTPATIENT
Start: 2023-11-28

## 2023-11-28 RX ORDER — AZITHROMYCIN 250 MG/1
TABLET, FILM COATED ORAL
Qty: 6 TABLET | Refills: 0 | Status: SHIPPED | OUTPATIENT
Start: 2023-11-28

## 2023-11-28 NOTE — PATIENT INSTRUCTIONS
Upper Respiratory Infection, Adult  An upper respiratory infection (URI) affects the nose, throat, and upper airways that lead to the lungs. The most common type of URI is often called the common cold. URIs usually get better on their own, without medical treatment.  What are the causes?  A URI is caused by a germ (virus). You may catch these germs by:  Breathing in droplets from an infected person's cough or sneeze.  Touching something that has the germ on it (is contaminated) and then touching your mouth, nose, or eyes.  What increases the risk?  You are more likely to get a URI if:  You are very young or very old.  You have close contact with others, such as at work, school, or a health care facility.  You smoke.  You have long-term (chronic) heart or lung disease.  You have a weakened disease-fighting system (immune system).  You have nasal allergies or asthma.  You have a lot of stress.  You have poor nutrition.  What are the signs or symptoms?  Runny or stuffy (congested) nose.  Cough.  Sneezing.  Sore throat.  Headache.  Feeling tired (fatigue).  Fever.  Not wanting to eat as much as usual.  Pain in your forehead, behind your eyes, and over your cheekbones (sinus pain).  Muscle aches.  Redness or irritation of the eyes.  Pressure in the ears or face.  How is this treated?  URIs usually get better on their own within 7-10 days. Medicines cannot cure URIs, but your doctor may recommend certain medicines to help relieve symptoms, such as:  Over-the-counter cold medicines.  Medicines to reduce coughing (cough suppressants). Coughing is a type of defense against infection that helps to clear the nose, throat, windpipe, and lungs (respiratory system). Take these medicines only as told by your doctor.  Medicines to lower your fever.  Follow these instructions at home:  Activity  Rest as needed.  If you have a fever, stay home from work or school until your fever is gone, or until your doctor says you may return to  work or school.  You should stay home until you cannot spread the infection anymore (you are not contagious).  Your doctor may have you wear a face mask so you have less risk of spreading the infection.  Relieving symptoms  Rinse your mouth often with salt water. To make salt water, dissolve ½-1 tsp (3-6 g) of salt in 1 cup (237 mL) of warm water.  Use a cool-mist humidifier to add moisture to the air. This can help you breathe more easily.  Eating and drinking    Drink enough fluid to keep your pee (urine) pale yellow.  Eat soups and other clear broths.  General instructions    Take over-the-counter and prescription medicines only as told by your doctor.  Do not smoke or use any products that contain nicotine or tobacco. If you need help quitting, ask your doctor.  Avoid being where people are smoking (avoid secondhand smoke).  Stay up to date on all your shots (immunizations), and get the flu shot every year.  Keep all follow-up visits.  How to prevent the spread of infection to others    Wash your hands with soap and water for at least 20 seconds. If you cannot use soap and water, use hand .  Avoid touching your mouth, face, eyes, or nose.  Cough or sneeze into a tissue or your sleeve or elbow. Do not cough or sneeze into your hand or into the air.  Contact a doctor if:  You are getting worse, not better.  You have any of these:  A fever or chills.  Brown or red mucus in your nose.  Yellow or brown fluid (discharge)coming from your nose.  Pain in your face, especially when you bend forward.  Swollen neck glands.  Pain when you swallow.  White areas in the back of your throat.  Get help right away if:  You have shortness of breath that gets worse.  You have very bad or constant:  Headache.  Ear pain.  Pain in your forehead, behind your eyes, and over your cheekbones (sinus pain).  Chest pain.  You have long-lasting (chronic) lung disease along with any of these:  Making high-pitched whistling sounds when  you breathe, most often when you breathe out (wheezing).  Long-lasting cough (more than 14 days).  Coughing up blood.  A change in your usual mucus.  You have a stiff neck.  You have changes in your:  Vision.  Hearing.  Thinking.  Mood.  These symptoms may be an emergency. Get help right away. Call 911.  Do not wait to see if the symptoms will go away.  Do not drive yourself to the hospital.  Summary  An upper respiratory infection (URI) is caused by a germ (virus). The most common type of URI is often called the common cold.  URIs usually get better within 7-10 days.  Take over-the-counter and prescription medicines only as told by your doctor.  This information is not intended to replace advice given to you by your health care provider. Make sure you discuss any questions you have with your health care provider.  Document Revised: 07/20/2022 Document Reviewed: 07/20/2022  [employer] Patient Education © 2023 [employer]

## 2023-11-28 NOTE — PROGRESS NOTES
"Chief Complaint  Generalized Body Aches (Symptoms started yesterday), Nasal Congestion, Sore Throat, and Cough    Subjective        Riri Martinez presents to [facility]  History of Present Illness  Pt presents c/o sore throat, body aches, nasal congestion, cough x 2 days. Denies fever, chills, SOB, chest pain, n/v/d or other. States her aunt has been sick but not sure what she had. Has not taken anything to treat.     Reviewed all recent labs and medications.      Objective   Vital Signs:  /61   Pulse 107   Temp 97.5 °F (36.4 °C) (Temporal)   Ht 149.9 cm (59\")   Wt 53.5 kg (118 lb)   SpO2 100%   BMI 23.83 kg/m²   Estimated body mass index is 23.83 kg/m² as calculated from the following:    Height as of this encounter: 149.9 cm (59\").    Weight as of this encounter: 53.5 kg (118 lb).       BMI is within normal parameters. No other follow-up for BMI required.      Physical Exam  Vitals reviewed.   Constitutional:       General: She is not in acute distress.     Appearance: Normal appearance.   HENT:      Head: Normocephalic.      Right Ear: Tympanic membrane normal.      Left Ear: Tympanic membrane normal.      Nose: Nose normal. Congestion present.      Mouth/Throat:      Pharynx: Oropharynx is clear. Posterior oropharyngeal erythema present.   Eyes:      General: No scleral icterus.     Extraocular Movements: Extraocular movements intact.      Conjunctiva/sclera: Conjunctivae normal.      Pupils: Pupils are equal, round, and reactive to light.   Cardiovascular:      Rate and Rhythm: Normal rate and regular rhythm.      Pulses: Normal pulses.      Heart sounds: Normal heart sounds.   Pulmonary:      Effort: Pulmonary effort is normal.      Breath sounds: Normal breath sounds.   Abdominal:      General: Bowel sounds are normal.      Palpations: Abdomen is soft.   Musculoskeletal:         General: Normal range of motion.      Cervical back: Neck supple.   Skin:     " General: Skin is warm and dry.   Neurological:      Mental Status: She is alert and oriented to person, place, and time.   Psychiatric:         Mood and Affect: Mood normal.         Behavior: Behavior normal.         Thought Content: Thought content normal.         Judgment: Judgment normal.        Result Review :    Common labs          9/7/2023    07:19   Common Labs   WBC 5.60    Hemoglobin 11.5    Hematocrit 35.6    Platelets 217      Data reviewed : Consultant notes ob/gyn             Assessment and Plan   Diagnoses and all orders for this visit:    1. Sorethroat (Primary)  Comments:  rapid strep, covid, flu negative   ibu 600mg PO tid prn   salt water gargles tid prn  Orders:  -     POCT rapid strep A  -     POCT SARS-CoV-2 Antigen ALMA + Flu    2. Cough, unspecified type  -     POCT rapid strep A  -     POCT SARS-CoV-2 Antigen ALMA + Flu    3. Generalized body aches  -     POCT rapid strep A  -     POCT SARS-CoV-2 Antigen ALMA + Flu    4. Upper respiratory tract infection, unspecified type  Comments:  zpack UAD   medrol dose ayo UAD   increase rest/clear fluids    5. Encounter for surveillance of injectable contraceptive  Assessment & Plan:  Pt is due for depo injection   States she will  and receive injection tomorrow       Other orders  -     azithromycin (Zithromax Z-Ayo) 250 MG tablet; UAD  Dispense: 6 tablet; Refill: 0  -     methylPREDNISolone (MEDROL) 4 MG dose pack; Take as directed on package instructions.  Dispense: 21 tablet; Refill: 0  -     ibuprofen (ADVIL,MOTRIN) 600 MG tablet; Take 1 tablet by mouth Every 6 (Six) Hours As Needed for Mild Pain.  Dispense: 40 tablet; Refill: 5             Follow Up   Return if symptoms worsen or fail to improve.  Patient was given instructions and counseling regarding her condition or for health maintenance advice. Please see specific information pulled into the AVS if appropriate.

## 2023-11-29 ENCOUNTER — CLINICAL SUPPORT (OUTPATIENT)
Dept: FAMILY MEDICINE CLINIC | Facility: CLINIC | Age: 23
End: 2023-11-29
Payer: MEDICAID

## 2023-11-29 DIAGNOSIS — Z30.9 ENCOUNTER FOR CONTRACEPTIVE MANAGEMENT, UNSPECIFIED TYPE: Primary | ICD-10-CM

## 2023-11-29 RX ORDER — MEDROXYPROGESTERONE ACETATE 150 MG/ML
150 INJECTION, SUSPENSION INTRAMUSCULAR ONCE
Status: COMPLETED | OUTPATIENT
Start: 2023-11-29 | End: 2023-11-29

## 2023-11-29 RX ADMIN — MEDROXYPROGESTERONE ACETATE 150 MG: 150 INJECTION, SUSPENSION INTRAMUSCULAR at 15:28

## 2023-12-26 ENCOUNTER — TELEPHONE (OUTPATIENT)
Dept: OBSTETRICS AND GYNECOLOGY | Facility: CLINIC | Age: 23
End: 2023-12-26

## 2024-03-06 RX ORDER — MEDROXYPROGESTERONE ACETATE 150 MG/ML
INJECTION, SUSPENSION INTRAMUSCULAR
Qty: 1 ML | Refills: 0 | Status: SHIPPED | OUTPATIENT
Start: 2024-03-06

## 2024-03-08 ENCOUNTER — CLINICAL SUPPORT (OUTPATIENT)
Dept: FAMILY MEDICINE CLINIC | Facility: CLINIC | Age: 24
End: 2024-03-08
Payer: MEDICAID

## 2024-03-08 DIAGNOSIS — Z30.42 ENCOUNTER FOR SURVEILLANCE OF INJECTABLE CONTRACEPTIVE: Primary | ICD-10-CM

## 2024-03-08 RX ORDER — MEDROXYPROGESTERONE ACETATE 150 MG/ML
150 INJECTION, SUSPENSION INTRAMUSCULAR ONCE
Status: COMPLETED | OUTPATIENT
Start: 2024-03-08 | End: 2024-03-08

## 2024-03-08 RX ADMIN — MEDROXYPROGESTERONE ACETATE 150 MG: 150 INJECTION, SUSPENSION INTRAMUSCULAR at 11:29

## 2024-04-19 ENCOUNTER — OFFICE VISIT (OUTPATIENT)
Dept: FAMILY MEDICINE CLINIC | Facility: CLINIC | Age: 24
End: 2024-04-19
Payer: MEDICAID

## 2024-04-19 VITALS
HEART RATE: 79 BPM | SYSTOLIC BLOOD PRESSURE: 116 MMHG | BODY MASS INDEX: 25.12 KG/M2 | TEMPERATURE: 99.1 F | WEIGHT: 124.6 LBS | HEIGHT: 59 IN | DIASTOLIC BLOOD PRESSURE: 75 MMHG | OXYGEN SATURATION: 99 %

## 2024-04-19 DIAGNOSIS — R04.0 EPISTAXIS: ICD-10-CM

## 2024-04-19 DIAGNOSIS — R22.0 NASAL SWELLING: Primary | ICD-10-CM

## 2024-04-19 DIAGNOSIS — G43.911 INTRACTABLE MIGRAINE WITH STATUS MIGRAINOSUS, UNSPECIFIED MIGRAINE TYPE: ICD-10-CM

## 2024-04-19 PROCEDURE — 99214 OFFICE O/P EST MOD 30 MIN: CPT

## 2024-04-29 ENCOUNTER — HOSPITAL ENCOUNTER (OUTPATIENT)
Dept: CT IMAGING | Facility: HOSPITAL | Age: 24
Discharge: HOME | End: 2024-04-29
Payer: MEDICAID

## 2024-04-29 DIAGNOSIS — G43.911 INTRACTABLE MIGRAINE WITH STATUS MIGRAINOSUS, UNSPECIFIED MIGRAINE TYPE: ICD-10-CM

## 2024-04-29 DIAGNOSIS — R04.0 EPISTAXIS: ICD-10-CM

## 2024-04-29 DIAGNOSIS — R22.0 NASAL SWELLING: ICD-10-CM

## 2024-04-29 PROCEDURE — 70486 CT MAXILLOFACIAL W/O DYE: CPT

## 2024-05-03 ENCOUNTER — OFFICE VISIT (OUTPATIENT)
Dept: FAMILY MEDICINE CLINIC | Facility: CLINIC | Age: 24
End: 2024-05-03
Payer: MEDICAID

## 2024-05-03 ENCOUNTER — LAB (OUTPATIENT)
Dept: LAB | Facility: HOSPITAL | Age: 24
End: 2024-05-03
Payer: MEDICAID

## 2024-05-03 VITALS
OXYGEN SATURATION: 100 % | TEMPERATURE: 98.2 F | BODY MASS INDEX: 24.72 KG/M2 | SYSTOLIC BLOOD PRESSURE: 111 MMHG | DIASTOLIC BLOOD PRESSURE: 64 MMHG | HEART RATE: 86 BPM | WEIGHT: 122.6 LBS | RESPIRATION RATE: 16 BRPM | HEIGHT: 59 IN

## 2024-05-03 DIAGNOSIS — N80.9 ENDOMETRIOSIS DETERMINED BY LAPAROSCOPY: ICD-10-CM

## 2024-05-03 DIAGNOSIS — R53.83 OTHER FATIGUE: ICD-10-CM

## 2024-05-03 DIAGNOSIS — Z13.220 SCREENING FOR LIPID DISORDERS: ICD-10-CM

## 2024-05-03 DIAGNOSIS — Z11.59 NEED FOR HEPATITIS C SCREENING TEST: ICD-10-CM

## 2024-05-03 DIAGNOSIS — Z00.00 ANNUAL PHYSICAL EXAM: Primary | ICD-10-CM

## 2024-05-03 DIAGNOSIS — Z13.29 SCREENING FOR THYROID DISORDER: ICD-10-CM

## 2024-05-03 DIAGNOSIS — D50.9 IRON DEFICIENCY ANEMIA, UNSPECIFIED IRON DEFICIENCY ANEMIA TYPE: ICD-10-CM

## 2024-05-03 DIAGNOSIS — E55.9 VITAMIN D DEFICIENCY: ICD-10-CM

## 2024-05-03 DIAGNOSIS — G43.719 INTRACTABLE CHRONIC MIGRAINE WITHOUT AURA AND WITHOUT STATUS MIGRAINOSUS: ICD-10-CM

## 2024-05-03 DIAGNOSIS — Z00.00 ANNUAL PHYSICAL EXAM: ICD-10-CM

## 2024-05-03 DIAGNOSIS — F41.1 GAD (GENERALIZED ANXIETY DISORDER): ICD-10-CM

## 2024-05-03 DIAGNOSIS — J30.89 ALLERGIC RHINITIS DUE TO OTHER ALLERGIC TRIGGER, UNSPECIFIED SEASONALITY: ICD-10-CM

## 2024-05-03 DIAGNOSIS — F32.A DEPRESSION, UNSPECIFIED DEPRESSION TYPE: ICD-10-CM

## 2024-05-03 PROBLEM — J30.9 ALLERGIC RHINITIS: Status: ACTIVE | Noted: 2024-05-03

## 2024-05-03 LAB
25(OH)D3 SERPL-MCNC: 23.7 NG/ML (ref 30–100)
ALBUMIN SERPL-MCNC: 4.6 G/DL (ref 3.5–5.2)
ALBUMIN/GLOB SERPL: 1.7 G/DL
ALP SERPL-CCNC: 82 U/L (ref 39–117)
ALT SERPL W P-5'-P-CCNC: 25 U/L (ref 1–33)
ANION GAP SERPL CALCULATED.3IONS-SCNC: 10.2 MMOL/L (ref 5–15)
AST SERPL-CCNC: 20 U/L (ref 1–32)
BASOPHILS # BLD AUTO: 0.05 10*3/MM3 (ref 0–0.2)
BASOPHILS NFR BLD AUTO: 1 % (ref 0–1.5)
BILIRUB SERPL-MCNC: 0.7 MG/DL (ref 0–1.2)
BUN SERPL-MCNC: 10 MG/DL (ref 6–20)
BUN/CREAT SERPL: 15.6 (ref 7–25)
CALCIUM SPEC-SCNC: 9.5 MG/DL (ref 8.6–10.5)
CHLORIDE SERPL-SCNC: 106 MMOL/L (ref 98–107)
CHOLEST SERPL-MCNC: 147 MG/DL (ref 0–200)
CO2 SERPL-SCNC: 22.8 MMOL/L (ref 22–29)
CREAT SERPL-MCNC: 0.64 MG/DL (ref 0.57–1)
DEPRECATED RDW RBC AUTO: 44.2 FL (ref 37–54)
EGFRCR SERPLBLD CKD-EPI 2021: 125.2 ML/MIN/1.73
EOSINOPHIL # BLD AUTO: 0.21 10*3/MM3 (ref 0–0.4)
EOSINOPHIL NFR BLD AUTO: 4 % (ref 0.3–6.2)
ERYTHROCYTE [DISTWIDTH] IN BLOOD BY AUTOMATED COUNT: 14 % (ref 12.3–15.4)
FOLATE SERPL-MCNC: 11.8 NG/ML (ref 4.78–24.2)
FSH SERPL-ACNC: 6.99 MIU/ML
GLOBULIN UR ELPH-MCNC: 2.7 GM/DL
GLUCOSE SERPL-MCNC: 93 MG/DL (ref 65–99)
HCT VFR BLD AUTO: 38.9 % (ref 34–46.6)
HCV AB SER QL: NORMAL
HDLC SERPL-MCNC: 34 MG/DL (ref 40–60)
HGB BLD-MCNC: 12.3 G/DL (ref 12–15.9)
IMM GRANULOCYTES # BLD AUTO: 0.01 10*3/MM3 (ref 0–0.05)
IMM GRANULOCYTES NFR BLD AUTO: 0.2 % (ref 0–0.5)
IRON 24H UR-MRATE: 32 MCG/DL (ref 37–145)
IRON SATN MFR SERPL: 8 % (ref 20–50)
LDLC SERPL CALC-MCNC: 101 MG/DL (ref 0–100)
LDLC/HDLC SERPL: 2.98 {RATIO}
LH SERPL-ACNC: 10 MIU/ML
LYMPHOCYTES # BLD AUTO: 1 10*3/MM3 (ref 0.7–3.1)
LYMPHOCYTES NFR BLD AUTO: 19.2 % (ref 19.6–45.3)
MCH RBC QN AUTO: 27.4 PG (ref 26.6–33)
MCHC RBC AUTO-ENTMCNC: 31.6 G/DL (ref 31.5–35.7)
MCV RBC AUTO: 86.6 FL (ref 79–97)
MONOCYTES # BLD AUTO: 0.41 10*3/MM3 (ref 0.1–0.9)
MONOCYTES NFR BLD AUTO: 7.9 % (ref 5–12)
NEUTROPHILS NFR BLD AUTO: 3.54 10*3/MM3 (ref 1.7–7)
NEUTROPHILS NFR BLD AUTO: 67.7 % (ref 42.7–76)
NRBC BLD AUTO-RTO: 0 /100 WBC (ref 0–0.2)
PLATELET # BLD AUTO: 213 10*3/MM3 (ref 140–450)
PMV BLD AUTO: 10.7 FL (ref 6–12)
POTASSIUM SERPL-SCNC: 3.9 MMOL/L (ref 3.5–5.2)
PROGEST SERPL-MCNC: 0.22 NG/ML
PROT SERPL-MCNC: 7.3 G/DL (ref 6–8.5)
RBC # BLD AUTO: 4.49 10*6/MM3 (ref 3.77–5.28)
SODIUM SERPL-SCNC: 139 MMOL/L (ref 136–145)
TIBC SERPL-MCNC: 395 MCG/DL (ref 298–536)
TRANSFERRIN SERPL-MCNC: 265 MG/DL (ref 200–360)
TRIGL SERPL-MCNC: 58 MG/DL (ref 0–150)
TSH SERPL DL<=0.05 MIU/L-ACNC: 2.14 UIU/ML (ref 0.27–4.2)
VIT B12 BLD-MCNC: 367 PG/ML (ref 211–946)
VLDLC SERPL-MCNC: 12 MG/DL (ref 5–40)
WBC NRBC COR # BLD AUTO: 5.22 10*3/MM3 (ref 3.4–10.8)

## 2024-05-03 PROCEDURE — 84466 ASSAY OF TRANSFERRIN: CPT

## 2024-05-03 PROCEDURE — 85025 COMPLETE CBC W/AUTO DIFF WBC: CPT

## 2024-05-03 PROCEDURE — 83002 ASSAY OF GONADOTROPIN (LH): CPT

## 2024-05-03 PROCEDURE — 80061 LIPID PANEL: CPT

## 2024-05-03 PROCEDURE — 86803 HEPATITIS C AB TEST: CPT

## 2024-05-03 PROCEDURE — 80053 COMPREHEN METABOLIC PANEL: CPT

## 2024-05-03 PROCEDURE — 82306 VITAMIN D 25 HYDROXY: CPT

## 2024-05-03 PROCEDURE — 36415 COLL VENOUS BLD VENIPUNCTURE: CPT

## 2024-05-03 PROCEDURE — 84443 ASSAY THYROID STIM HORMONE: CPT

## 2024-05-03 PROCEDURE — 82607 VITAMIN B-12: CPT

## 2024-05-03 PROCEDURE — 83001 ASSAY OF GONADOTROPIN (FSH): CPT

## 2024-05-03 PROCEDURE — 84144 ASSAY OF PROGESTERONE: CPT

## 2024-05-03 PROCEDURE — 82746 ASSAY OF FOLIC ACID SERUM: CPT

## 2024-05-03 PROCEDURE — 83540 ASSAY OF IRON: CPT

## 2024-05-03 PROCEDURE — 82672 ASSAY OF ESTROGEN: CPT

## 2024-05-03 RX ORDER — SUMATRIPTAN 100 MG/1
TABLET, FILM COATED ORAL
Qty: 10 TABLET | Refills: 2 | Status: SHIPPED | OUTPATIENT
Start: 2024-05-03

## 2024-05-03 RX ORDER — CETIRIZINE HYDROCHLORIDE 10 MG/1
10 TABLET ORAL DAILY
Qty: 90 TABLET | Refills: 1 | Status: SHIPPED | OUTPATIENT
Start: 2024-05-03

## 2024-05-03 NOTE — ASSESSMENT & PLAN NOTE
Headaches are worsening.    Plan:  Begin taking the following medication/s; imitrex UAD, given samples of Ubrelvy UAD prn.     Discussed medication dosage, use, side effects, and goals of treatment in detail.    Discussed monitoring symptoms and use of quick-relief medications and maintenance medication.    General Treatment Goals:   symptom prevention  minimize work absence  minimizing limitation in activity  prevention of exacerbations  decrease use of ER/inpatient care  minimization of adverse effects of treatment    Followup at the next regular appointment

## 2024-05-03 NOTE — ASSESSMENT & PLAN NOTE
Patient's depression is  in remission  that is mild without psychosis. Depression is in full remission and stable.    Plan:   CTM     Followup at the next regular appointment.

## 2024-05-03 NOTE — PROGRESS NOTES
"Chief Complaint  Annual Exam (Pt is wanting labs to check hormone levels do to being on the depo shot and \"feeling weird\" at times.)    Subjective        Riri Martinez presents to [facility]  History of Present Illness  Pt presents for annual PE/labs. Pt c/o freq migraine, up to 3x per month. States she has taken propanolol in the past, but it did not help. No other tx. Takes OTC ibu normally. Denies N/V, SOB, chest pain, dizziness, or other. Pt would like to have labs checked d/t recurrent fatigue despite getting adequate amounts of rest. Denies anxiety and depression at this time. Denies any thoughts of suicide or self harm.     Reviewed all recent labs and medications.      Objective   Vital Signs:  /64 (BP Location: Left arm, Patient Position: Sitting, Cuff Size: Adult)   Pulse 86   Temp 98.2 °F (36.8 °C) (Oral)   Resp 16   Ht 149.9 cm (59\")   Wt 55.6 kg (122 lb 9.6 oz)   SpO2 100%   BMI 24.76 kg/m²   Estimated body mass index is 24.76 kg/m² as calculated from the following:    Height as of this encounter: 149.9 cm (59\").    Weight as of this encounter: 55.6 kg (122 lb 9.6 oz).               Physical Exam  Vitals reviewed.   Constitutional:       General: She is not in acute distress.     Appearance: Normal appearance.   HENT:      Head: Normocephalic.      Right Ear: Tympanic membrane normal.      Left Ear: Tympanic membrane normal.      Nose: Nose normal.      Mouth/Throat:      Pharynx: Oropharynx is clear. No posterior oropharyngeal erythema.   Eyes:      General: No scleral icterus.     Extraocular Movements: Extraocular movements intact.      Conjunctiva/sclera: Conjunctivae normal.      Pupils: Pupils are equal, round, and reactive to light.   Cardiovascular:      Rate and Rhythm: Normal rate and regular rhythm.      Pulses: Normal pulses.      Heart sounds: Normal heart sounds.   Pulmonary:      Effort: Pulmonary effort is normal.      Breath sounds: " Normal breath sounds.   Abdominal:      General: Bowel sounds are normal.      Palpations: Abdomen is soft.   Musculoskeletal:         General: Normal range of motion.      Cervical back: Neck supple.   Skin:     General: Skin is warm and dry.   Neurological:      Mental Status: She is alert and oriented to person, place, and time.   Psychiatric:         Mood and Affect: Mood normal.         Behavior: Behavior normal.         Thought Content: Thought content normal.         Judgment: Judgment normal.        Result Review :      Common labs          9/7/2023    07:19   Common Labs   WBC 5.60    Hemoglobin 11.5    Hematocrit 35.6    Platelets 217                   Assessment and Plan     Diagnoses and all orders for this visit:    1. Annual physical exam (Primary)  Assessment & Plan:  Discussed age appropriate preventative counseling including all recommended screenings and immunizations, sunscreen, and seatbelt use. Written information provided to patient. All questions answered. Pt verbalized understanding.       Orders:  -     Lipid Panel; Future  -     CBC & Differential; Future  -     Comprehensive Metabolic Panel; Future  -     TSH; Future    2. Screening for thyroid disorder  -     TSH; Future    3. Screening for lipid disorders  -     Lipid Panel; Future    4. Need for hepatitis C screening test  -     Hepatitis C antibody; Future    5. Endometriosis determined by laparoscopy  -     Estrogens, Total; Future  -     Follicle Stimulating Hormone; Future  -     Luteinizing Hormone; Future  -     Progesterone; Future    6. CAIO (generalized anxiety disorder)  Assessment & Plan:  Psychological condition is improving with lifestyle modifications.  Regular aerobic exercise.  Psychological condition  will be reassessed at the next regular appointment.      7. Depression, unspecified depression type  Assessment & Plan:  Patient's depression is  in remission  that is mild without psychosis. Depression is in full remission  and stable.    Plan:   CTM     Followup at the next regular appointment.       8. Intractable chronic migraine without aura and without status migrainosus  Assessment & Plan:  Headaches are worsening.    Plan:  Begin taking the following medication/s; imitrex UAD, given samples of Ubrelvy UAD prn.     Discussed medication dosage, use, side effects, and goals of treatment in detail.    Discussed monitoring symptoms and use of quick-relief medications and maintenance medication.    General Treatment Goals:   symptom prevention  minimize work absence  minimizing limitation in activity  prevention of exacerbations  decrease use of ER/inpatient care  minimization of adverse effects of treatment    Followup at the next regular appointment                  9. Allergic rhinitis due to other allergic trigger, unspecified seasonality  Assessment & Plan:  Start zyrtec 10mg PO qd  Avoid allergy triggers       10. Other fatigue  -     Estrogens, Total; Future  -     Follicle Stimulating Hormone; Future  -     Luteinizing Hormone; Future  -     Progesterone; Future    11. Iron deficiency anemia, unspecified iron deficiency anemia type  -     Iron and TIBC; Future  -     Vitamin B12 & Folate; Future    12. Vitamin D deficiency  -     Vitamin D 25 hydroxy; Future    Other orders  -     SUMAtriptan (Imitrex) 100 MG tablet; Take one tablet at onset of headache. May repeat dose one time in 2 hours if headache not relieved.  Dispense: 10 tablet; Refill: 2  -     cetirizine (zyrTEC) 10 MG tablet; Take 1 tablet by mouth Daily.  Dispense: 90 tablet; Refill: 1  -     ubrogepant (Ubrelvy) 100 MG tablet; Take 1 tablet by mouth 1 (One) Time As Needed (migraine) for up to 1 dose.  Dispense: 2 tablet; Refill: 0             Follow Up     Return in 4 weeks (on 5/31/2024), or if symptoms worsen or fail to improve.  Patient was given instructions and counseling regarding her condition or for health maintenance advice. Please see specific  information pulled into the AVS if appropriate.

## 2024-05-06 ENCOUNTER — PATIENT ROUNDING (BHMG ONLY) (OUTPATIENT)
Dept: FAMILY MEDICINE CLINIC | Facility: CLINIC | Age: 24
End: 2024-05-06
Payer: MEDICAID

## 2024-05-07 RX ORDER — FERROUS SULFATE 325(65) MG
325 TABLET ORAL
Qty: 30 TABLET | Refills: 5 | Status: SHIPPED | OUTPATIENT
Start: 2024-05-07

## 2024-05-07 RX ORDER — ERGOCALCIFEROL 1.25 MG/1
50000 CAPSULE ORAL WEEKLY
Qty: 12 CAPSULE | Refills: 1 | Status: SHIPPED | OUTPATIENT
Start: 2024-05-07

## 2024-05-10 LAB — ESTROGEN SERPL-MCNC: 36 PG/ML

## 2024-06-04 ENCOUNTER — OFFICE VISIT (OUTPATIENT)
Dept: FAMILY MEDICINE CLINIC | Facility: CLINIC | Age: 24
End: 2024-06-04
Payer: MEDICAID

## 2024-06-04 VITALS
OXYGEN SATURATION: 100 % | BODY MASS INDEX: 25 KG/M2 | TEMPERATURE: 98.4 F | HEIGHT: 59 IN | DIASTOLIC BLOOD PRESSURE: 73 MMHG | HEART RATE: 87 BPM | SYSTOLIC BLOOD PRESSURE: 108 MMHG | WEIGHT: 124 LBS

## 2024-06-04 DIAGNOSIS — G43.719 INTRACTABLE CHRONIC MIGRAINE WITHOUT AURA AND WITHOUT STATUS MIGRAINOSUS: ICD-10-CM

## 2024-06-04 DIAGNOSIS — Z30.9 ENCOUNTER FOR CONTRACEPTIVE MANAGEMENT, UNSPECIFIED TYPE: Primary | ICD-10-CM

## 2024-06-04 DIAGNOSIS — J30.9 ALLERGIC RHINITIS, UNSPECIFIED SEASONALITY, UNSPECIFIED TRIGGER: ICD-10-CM

## 2024-06-04 PROCEDURE — 96372 THER/PROPH/DIAG INJ SC/IM: CPT | Performed by: NURSE PRACTITIONER

## 2024-06-04 PROCEDURE — 1160F RVW MEDS BY RX/DR IN RCRD: CPT | Performed by: NURSE PRACTITIONER

## 2024-06-04 PROCEDURE — 1125F AMNT PAIN NOTED PAIN PRSNT: CPT | Performed by: NURSE PRACTITIONER

## 2024-06-04 PROCEDURE — 99214 OFFICE O/P EST MOD 30 MIN: CPT | Performed by: NURSE PRACTITIONER

## 2024-06-04 PROCEDURE — 1159F MED LIST DOCD IN RCRD: CPT | Performed by: NURSE PRACTITIONER

## 2024-06-04 RX ORDER — MEDROXYPROGESTERONE ACETATE 150 MG/ML
150 INJECTION, SUSPENSION INTRAMUSCULAR ONCE
Status: SHIPPED | OUTPATIENT
Start: 2024-06-04

## 2024-06-04 RX ORDER — TOPIRAMATE 50 MG/1
50 TABLET, FILM COATED ORAL 2 TIMES DAILY
Qty: 60 TABLET | Refills: 2 | Status: SHIPPED | OUTPATIENT
Start: 2024-06-04

## 2024-06-04 RX ORDER — KETOROLAC TROMETHAMINE 30 MG/ML
60 INJECTION, SOLUTION INTRAMUSCULAR; INTRAVENOUS ONCE
Status: COMPLETED | OUTPATIENT
Start: 2024-06-04 | End: 2024-06-04

## 2024-06-04 RX ORDER — MEDROXYPROGESTERONE ACETATE 150 MG/ML
INJECTION, SUSPENSION INTRAMUSCULAR
Qty: 1 ML | Refills: 0 | Status: SHIPPED | OUTPATIENT
Start: 2024-06-04

## 2024-06-04 RX ORDER — MEDROXYPROGESTERONE ACETATE 150 MG/ML
INJECTION, SUSPENSION INTRAMUSCULAR
Qty: 1 ML | Refills: 0 | OUTPATIENT
Start: 2024-06-04

## 2024-06-04 RX ADMIN — KETOROLAC TROMETHAMINE 60 MG: 30 INJECTION, SOLUTION INTRAMUSCULAR; INTRAVENOUS at 11:19

## 2024-06-04 NOTE — ASSESSMENT & PLAN NOTE
Headaches are  unchanged .    Plan:  Discontinue the following medication/s; ubrelvey and imitrex.   and Begin taking the following medication/s; topamax begin 25mg PO qd x 1 week, then increase by 25mg PO qd each week till 50mg PO bid.   All questions answered, pt verbalizes understanding, and agrees to POC    Discussed medication dosage, use, side effects, and goals of treatment in detail.    Discussed monitoring symptoms and use of quick-relief medications and maintenance medication.  Counseled patient on lifestyle modifications to help control headaches.   Headache information shared with patient.  Reminded patient to keep a detailed headache diary and bring it in to next office visit.    General Treatment Goals:   symptom prevention  minimize work absence  minimizing limitation in activity  prevention of exacerbations  decrease use of ER/inpatient care  minimization of adverse effects of treatment    Followup in 4 weeks

## 2024-06-04 NOTE — PROGRESS NOTES
"Chief Complaint  Follow-up (1 month follow up on medications.)    Subjective        Riri Martinez presents to [facility]  History of Present Illness  Pt presents 1 month FU migraine. Given imitrex and a sample of ubrelvey. Pt states neither of these worked. Pt states she does have a current migraine. Denies n/v/d, dizziness, SOB, chest pain or other. Pt has also tried propanolol in the past without success.     Pt to receive depo inj today.     Reviewed all recent labs and medications.      Objective   Vital Signs:  /73   Pulse 87   Temp 98.4 °F (36.9 °C) (Temporal)   Ht 149.9 cm (59\")   Wt 56.2 kg (124 lb)   SpO2 100%   BMI 25.04 kg/m²   Estimated body mass index is 25.04 kg/m² as calculated from the following:    Height as of this encounter: 149.9 cm (59\").    Weight as of this encounter: 56.2 kg (124 lb).       BMI is within normal parameters. No other follow-up for BMI required.      Physical Exam  Vitals reviewed.   Constitutional:       General: She is not in acute distress.     Appearance: Normal appearance.   HENT:      Head: Normocephalic.      Right Ear: Tympanic membrane normal.      Left Ear: Tympanic membrane normal.      Nose: Nose normal.      Mouth/Throat:      Pharynx: Oropharynx is clear. No posterior oropharyngeal erythema.   Eyes:      General: No scleral icterus.     Extraocular Movements: Extraocular movements intact.      Conjunctiva/sclera: Conjunctivae normal.      Pupils: Pupils are equal, round, and reactive to light.   Cardiovascular:      Rate and Rhythm: Normal rate and regular rhythm.      Pulses: Normal pulses.      Heart sounds: Normal heart sounds.   Pulmonary:      Effort: Pulmonary effort is normal.      Breath sounds: Normal breath sounds.   Abdominal:      General: Bowel sounds are normal.      Palpations: Abdomen is soft.   Musculoskeletal:         General: Normal range of motion.      Cervical back: Neck supple.   Skin:     " General: Skin is warm and dry.   Neurological:      Mental Status: She is alert and oriented to person, place, and time.   Psychiatric:         Mood and Affect: Mood normal.         Behavior: Behavior normal.         Thought Content: Thought content normal.         Judgment: Judgment normal.        Result Review :      Common labs          9/7/2023    07:19 5/3/2024    11:01   Common Labs   Glucose  93    BUN  10    Creatinine  0.64    Sodium  139    Potassium  3.9    Chloride  106    Calcium  9.5    Albumin  4.6    Total Bilirubin  0.7    Alkaline Phosphatase  82    AST (SGOT)  20    ALT (SGPT)  25    WBC 5.60  5.22    Hemoglobin 11.5  12.3    Hematocrit 35.6  38.9    Platelets 217  213    Total Cholesterol  147    Triglycerides  58    HDL Cholesterol  34    LDL Cholesterol   101      Data reviewed : Consultant notes ob/gyn              Assessment and Plan     Diagnoses and all orders for this visit:    1. Intractable chronic migraine without aura and without status migrainosus (Primary)  Assessment & Plan:  Headaches are  unchanged .    Plan:  Discontinue the following medication/s; ubrelvey and imitrex.   and Begin taking the following medication/s; topamax begin 25mg PO qd x 1 week, then increase by 25mg PO qd each week till 50mg PO bid.   All questions answered, pt verbalizes understanding, and agrees to POC    Discussed medication dosage, use, side effects, and goals of treatment in detail.    Discussed monitoring symptoms and use of quick-relief medications and maintenance medication.  Counseled patient on lifestyle modifications to help control headaches.   Headache information shared with patient.  Reminded patient to keep a detailed headache diary and bring it in to next office visit.    General Treatment Goals:   symptom prevention  minimize work absence  minimizing limitation in activity  prevention of exacerbations  decrease use of ER/inpatient care  minimization of adverse effects of  treatment    Followup in 4 weeks                Orders:  -     ketorolac (TORADOL) injection 60 mg    2. Encounter for contraceptive management, unspecified type    3. Allergic rhinitis, unspecified seasonality, unspecified trigger  Assessment & Plan:  Start zyrtec 10mg PO qd  Avoid allergy triggers       Other orders  -     topiramate (Topamax) 50 MG tablet; Take 1 tablet by mouth 2 (Two) Times a Day.  Dispense: 60 tablet; Refill: 2             Follow Up     Return in about 4 weeks (around 7/2/2024), or if symptoms worsen or fail to improve.  Patient was given instructions and counseling regarding her condition or for health maintenance advice. Please see specific information pulled into the AVS if appropriate.

## 2024-08-08 ENCOUNTER — OFFICE VISIT (OUTPATIENT)
Dept: FAMILY MEDICINE CLINIC | Facility: CLINIC | Age: 24
End: 2024-08-08
Payer: MEDICAID

## 2024-08-08 VITALS
WEIGHT: 122.8 LBS | HEIGHT: 59 IN | SYSTOLIC BLOOD PRESSURE: 108 MMHG | RESPIRATION RATE: 18 BRPM | BODY MASS INDEX: 24.76 KG/M2 | HEART RATE: 96 BPM | OXYGEN SATURATION: 100 % | TEMPERATURE: 98.7 F | DIASTOLIC BLOOD PRESSURE: 64 MMHG

## 2024-08-08 DIAGNOSIS — J01.10 ACUTE NON-RECURRENT FRONTAL SINUSITIS: Primary | ICD-10-CM

## 2024-08-08 LAB
EXPIRATION DATE: NORMAL
FLUAV AG UPPER RESP QL IA.RAPID: NOT DETECTED
FLUBV AG UPPER RESP QL IA.RAPID: NOT DETECTED
INTERNAL CONTROL: NORMAL
Lab: NORMAL
SARS-COV-2 AG UPPER RESP QL IA.RAPID: NOT DETECTED

## 2024-08-08 PROCEDURE — 99213 OFFICE O/P EST LOW 20 MIN: CPT | Performed by: FAMILY MEDICINE

## 2024-08-08 PROCEDURE — 1125F AMNT PAIN NOTED PAIN PRSNT: CPT | Performed by: FAMILY MEDICINE

## 2024-08-08 PROCEDURE — 87428 SARSCOV & INF VIR A&B AG IA: CPT | Performed by: FAMILY MEDICINE

## 2024-08-08 RX ORDER — CEFDINIR 300 MG/1
300 CAPSULE ORAL 2 TIMES DAILY
Qty: 14 CAPSULE | Refills: 0 | Status: SHIPPED | OUTPATIENT
Start: 2024-08-08

## 2024-08-08 RX ORDER — BROMPHENIRAMINE MALEATE, PSEUDOEPHEDRINE HYDROCHLORIDE, AND DEXTROMETHORPHAN HYDROBROMIDE 2; 30; 10 MG/5ML; MG/5ML; MG/5ML
5 SYRUP ORAL 4 TIMES DAILY PRN
Qty: 118 ML | Refills: 0 | Status: SHIPPED | OUTPATIENT
Start: 2024-08-08

## 2024-08-13 NOTE — PROGRESS NOTES
"Chief Complaint  Cough (Dry cough), Headache, and Nasal Congestion (X 4 days. And body aches. )    Subjective          Riri Martinez presents to [facility]  Cough  Associated symptoms include headaches.   Headache      Patient presents with cough congestion headache ongoing for up to a week    Objective   Vital Signs:   /64 (BP Location: Left arm, Patient Position: Sitting, Cuff Size: Adult)   Pulse 96   Temp 98.7 °F (37.1 °C)   Resp 18   Ht 149.9 cm (59\")   Wt 55.7 kg (122 lb 12.8 oz)   SpO2 100%   BMI 24.80 kg/m²     BMI is within normal parameters. No other follow-up for BMI required.      Physical Exam  Vitals reviewed.   Constitutional:       Appearance: Normal appearance. She is well-developed.   HENT:      Head: Normocephalic and atraumatic.      Right Ear: External ear normal.      Left Ear: External ear normal.      Nose: Congestion and rhinorrhea present.   Eyes:      Conjunctiva/sclera: Conjunctivae normal.      Pupils: Pupils are equal, round, and reactive to light.   Cardiovascular:      Rate and Rhythm: Normal rate.   Pulmonary:      Effort: Pulmonary effort is normal.      Breath sounds: Normal breath sounds.   Abdominal:      General: There is no distension.   Skin:     General: Skin is warm and dry.   Neurological:      Mental Status: She is alert and oriented to person, place, and time. Mental status is at baseline.   Psychiatric:         Mood and Affect: Mood and affect normal.         Behavior: Behavior normal.         Thought Content: Thought content normal.         Judgment: Judgment normal.          Result Review :   The following data was reviewed by: Jagdeep Carpio DO on 08/08/2024:  Common labs          9/7/2023    07:19 5/3/2024    11:01   Common Labs   Glucose  93    BUN  10    Creatinine  0.64    Sodium  139    Potassium  3.9    Chloride  106    Calcium  9.5    Albumin  4.6    Total Bilirubin  0.7    Alkaline Phosphatase  82    AST " (SGOT)  20    ALT (SGPT)  25    WBC 5.60  5.22    Hemoglobin 11.5  12.3    Hematocrit 35.6  38.9    Platelets 217  213    Total Cholesterol  147    Triglycerides  58    HDL Cholesterol  34    LDL Cholesterol   101                    Assessment and Plan    Diagnoses and all orders for this visit:    1. Acute non-recurrent frontal sinusitis (Primary)  -     POCT SARS-CoV-2 + Flu Antigen ALMA    Other orders  -     cefdinir (OMNICEF) 300 MG capsule; Take 1 capsule by mouth 2 (Two) Times a Day.  Dispense: 14 capsule; Refill: 0  -     brompheniramine-pseudoephedrine-DM 30-2-10 MG/5ML syrup; Take 5 mL by mouth 4 (Four) Times a Day As Needed for Cough.  Dispense: 118 mL; Refill: 0      Testing negative we will treat with antibiotics and cough medicine as above follow-up with no improvement      Follow Up   No follow-ups on file.  Patient was given instructions and counseling regarding her condition or for health maintenance advice. Please see specific information pulled into the AVS if appropriate.     Transcribed from ambient dictation for Jagdeep Carpio DO by Jagdeep Carpio DO.  08/13/24   11:20 EDT

## 2024-09-03 RX ORDER — MEDROXYPROGESTERONE ACETATE 150 MG/ML
150 INJECTION, SUSPENSION INTRAMUSCULAR
Qty: 1 ML | Refills: 2 | Status: SHIPPED | OUTPATIENT
Start: 2024-09-03 | End: 2024-09-04 | Stop reason: SDUPTHER

## 2024-09-03 RX ORDER — MEDROXYPROGESTERONE ACETATE 150 MG/ML
INJECTION, SUSPENSION INTRAMUSCULAR
Qty: 1 ML | Refills: 0 | OUTPATIENT
Start: 2024-09-03

## 2024-09-04 RX ORDER — MEDROXYPROGESTERONE ACETATE 150 MG/ML
150 INJECTION, SUSPENSION INTRAMUSCULAR
Qty: 1 ML | Refills: 2 | Status: SHIPPED | OUTPATIENT
Start: 2024-09-04

## 2024-09-04 NOTE — TELEPHONE ENCOUNTER
Pharmacy needs directions updated, depo shot is given every 90 days and not every 30.  Sent new rx to reflect appropriate directions.

## 2024-09-10 ENCOUNTER — OFFICE VISIT (OUTPATIENT)
Dept: FAMILY MEDICINE CLINIC | Facility: CLINIC | Age: 24
End: 2024-09-10
Payer: MEDICAID

## 2024-09-10 ENCOUNTER — HOSPITAL ENCOUNTER (OUTPATIENT)
Dept: GENERAL RADIOLOGY | Facility: HOSPITAL | Age: 24
Discharge: HOME | End: 2024-09-10
Payer: MEDICAID

## 2024-09-10 VITALS
TEMPERATURE: 98.7 F | HEART RATE: 82 BPM | BODY MASS INDEX: 25.08 KG/M2 | DIASTOLIC BLOOD PRESSURE: 69 MMHG | HEIGHT: 59 IN | SYSTOLIC BLOOD PRESSURE: 117 MMHG | OXYGEN SATURATION: 100 % | WEIGHT: 124.4 LBS

## 2024-09-10 DIAGNOSIS — R20.0 NUMBNESS AND TINGLING OF RIGHT ARM: ICD-10-CM

## 2024-09-10 DIAGNOSIS — R20.2 NUMBNESS AND TINGLING OF RIGHT ARM: ICD-10-CM

## 2024-09-10 DIAGNOSIS — J30.9 ALLERGIC RHINITIS, UNSPECIFIED SEASONALITY, UNSPECIFIED TRIGGER: ICD-10-CM

## 2024-09-10 DIAGNOSIS — M79.644 PAIN OF RIGHT THUMB: ICD-10-CM

## 2024-09-10 DIAGNOSIS — G43.809 OTHER MIGRAINE WITHOUT STATUS MIGRAINOSUS, NOT INTRACTABLE: ICD-10-CM

## 2024-09-10 DIAGNOSIS — Z30.42 ENCOUNTER FOR DEPO-PROVERA CONTRACEPTION: ICD-10-CM

## 2024-09-10 DIAGNOSIS — M79.644 PAIN OF RIGHT THUMB: Primary | ICD-10-CM

## 2024-09-10 PROCEDURE — 1160F RVW MEDS BY RX/DR IN RCRD: CPT | Performed by: NURSE PRACTITIONER

## 2024-09-10 PROCEDURE — 73130 X-RAY EXAM OF HAND: CPT

## 2024-09-10 PROCEDURE — 1125F AMNT PAIN NOTED PAIN PRSNT: CPT | Performed by: NURSE PRACTITIONER

## 2024-09-10 PROCEDURE — 1159F MED LIST DOCD IN RCRD: CPT | Performed by: NURSE PRACTITIONER

## 2024-09-10 PROCEDURE — 81025 URINE PREGNANCY TEST: CPT | Performed by: NURSE PRACTITIONER

## 2024-09-10 PROCEDURE — 99214 OFFICE O/P EST MOD 30 MIN: CPT | Performed by: NURSE PRACTITIONER

## 2024-09-10 RX ORDER — MELOXICAM 15 MG/1
15 TABLET ORAL DAILY
Qty: 30 TABLET | Refills: 3 | Status: SHIPPED | OUTPATIENT
Start: 2024-09-10

## 2024-09-10 NOTE — ASSESSMENT & PLAN NOTE
Order for XR   Continue to wear brace   Meloxicam 15mg PO qd prn   Do not take with other NSAIDs  Increase rest of extremity

## 2024-09-10 NOTE — ASSESSMENT & PLAN NOTE
Headaches are stable.    Plan:  Continue same medication/s without change.     Discussed medication dosage, use, side effects, and goals of treatment in detail.    Discussed monitoring symptoms and use of quick-relief medications and maintenance medication.    General Treatment Goals:   symptom prevention  minimize work absence  minimizing limitation in activity  prevention of exacerbations  decrease use of ER/inpatient care  minimization of adverse effects of treatment    Followup at the next regular appointment

## 2024-09-10 NOTE — PROGRESS NOTES
"Chief Complaint  Numbness (When patient lays on her left side and back, her RIGHT arm goes numb from the shoulder down to her thumb)    Subjective        Riri Martinez presents to [facility]  History of Present Illness  Pt presents c/o R thumb pain, stiffness, decreased ROM, as well as R forearm and bicep numbness and tingling x 1 week. Denies any falls or injuries. Wearing wrist brace with thumb support and taking ibu to treat. States ibu doesn't seem to be helping. States numbness occurs when laying down on left side.     Pt requesting depo-provera injection. Last injection 6/4/24. Will obtain pregnancy test d/t 14 week lapse.     Reviewed all recent labs and medications.      Objective   Vital Signs:  /69 (BP Location: Left arm, Patient Position: Sitting, Cuff Size: Adult)   Pulse 82   Temp 98.7 °F (37.1 °C) (Temporal)   Ht 149.9 cm (59\")   Wt 56.4 kg (124 lb 6.4 oz)   SpO2 100%   BMI 25.13 kg/m²   Estimated body mass index is 25.13 kg/m² as calculated from the following:    Height as of this encounter: 149.9 cm (59\").    Weight as of this encounter: 56.4 kg (124 lb 6.4 oz).            Physical Exam  Vitals reviewed.   Constitutional:       General: She is not in acute distress.     Appearance: Normal appearance.   HENT:      Head: Normocephalic.      Right Ear: Tympanic membrane normal.      Left Ear: Tympanic membrane normal.      Nose: Nose normal.      Mouth/Throat:      Pharynx: Oropharynx is clear. No posterior oropharyngeal erythema.   Eyes:      General: No scleral icterus.     Extraocular Movements: Extraocular movements intact.      Conjunctiva/sclera: Conjunctivae normal.      Pupils: Pupils are equal, round, and reactive to light.   Cardiovascular:      Rate and Rhythm: Normal rate and regular rhythm.      Pulses: Normal pulses.      Heart sounds: Normal heart sounds.   Pulmonary:      Effort: Pulmonary effort is normal.      Breath sounds: Normal " breath sounds.   Abdominal:      General: Bowel sounds are normal.      Palpations: Abdomen is soft.   Musculoskeletal:      Right hand: Tenderness and bony tenderness present. No swelling or deformity. Decreased range of motion.      Cervical back: Neck supple.      Comments: R thumb decreased flexion   Skin:     General: Skin is warm and dry.   Neurological:      Mental Status: She is alert and oriented to person, place, and time.   Psychiatric:         Mood and Affect: Mood normal.         Behavior: Behavior normal.         Thought Content: Thought content normal.         Judgment: Judgment normal.        Result Review :    Common labs          5/3/2024    11:01   Common Labs   Glucose 93    BUN 10    Creatinine 0.64    Sodium 139    Potassium 3.9    Chloride 106    Calcium 9.5    Albumin 4.6    Total Bilirubin 0.7    Alkaline Phosphatase 82    AST (SGOT) 20    ALT (SGPT) 25    WBC 5.22    Hemoglobin 12.3    Hematocrit 38.9    Platelets 213    Total Cholesterol 147    Triglycerides 58    HDL Cholesterol 34    LDL Cholesterol  101      Data reviewed : Consultant notes ob/gyn            Assessment and Plan   Diagnoses and all orders for this visit:    1. Pain of right thumb (Primary)  Assessment & Plan:  Order for XR   Continue to wear brace   Meloxicam 15mg PO qd prn   Do not take with other NSAIDs  Increase rest of extremity     Orders:  -     XR Hand 3+ View Right; Future    2. Numbness and tingling of right arm  Assessment & Plan:  Order for Xr   Consider EMG testing if s/s worsen or persist despite conservative tx      3. Encounter for Depo-Provera contraception    4. Allergic rhinitis, unspecified seasonality, unspecified trigger  Assessment & Plan:  Controlled  Continue zyrtec 10mg PO qd  Avoid allergy triggers       5. Other migraine without status migrainosus, not intractable  Assessment & Plan:  Headaches are stable.    Plan:  Continue same medication/s without change.     Discussed medication dosage,  use, side effects, and goals of treatment in detail.    Discussed monitoring symptoms and use of quick-relief medications and maintenance medication.    General Treatment Goals:   symptom prevention  minimize work absence  minimizing limitation in activity  prevention of exacerbations  decrease use of ER/inpatient care  minimization of adverse effects of treatment    Followup at the next regular appointment                  Other orders  -     meloxicam (MOBIC) 15 MG tablet; Take 1 tablet by mouth Daily.  Dispense: 30 tablet; Refill: 3             Follow Up   Return if symptoms worsen or fail to improve.  Patient was given instructions and counseling regarding her condition or for health maintenance advice. Please see specific information pulled into the AVS if appropriate.

## 2024-10-02 ENCOUNTER — LAB (OUTPATIENT)
Dept: LAB | Facility: HOSPITAL | Age: 24
End: 2024-10-02
Payer: MEDICAID

## 2024-10-02 ENCOUNTER — OFFICE VISIT (OUTPATIENT)
Dept: FAMILY MEDICINE CLINIC | Facility: CLINIC | Age: 24
End: 2024-10-02
Payer: MEDICAID

## 2024-10-02 VITALS
HEART RATE: 88 BPM | WEIGHT: 122 LBS | SYSTOLIC BLOOD PRESSURE: 108 MMHG | OXYGEN SATURATION: 100 % | TEMPERATURE: 97.4 F | DIASTOLIC BLOOD PRESSURE: 65 MMHG | BODY MASS INDEX: 24.6 KG/M2 | HEIGHT: 59 IN

## 2024-10-02 DIAGNOSIS — Z20.2 POSSIBLE EXPOSURE TO STD: ICD-10-CM

## 2024-10-02 DIAGNOSIS — M79.644 PAIN OF RIGHT THUMB: ICD-10-CM

## 2024-10-02 DIAGNOSIS — Z20.2 POSSIBLE EXPOSURE TO STD: Primary | ICD-10-CM

## 2024-10-02 DIAGNOSIS — M65.311 TRIGGER FINGER OF RIGHT THUMB: ICD-10-CM

## 2024-10-02 LAB
C TRACH RRNA CVX QL NAA+PROBE: NOT DETECTED
CANDIDA SPECIES: NEGATIVE
GARDNERELLA VAGINALIS: NEGATIVE
HBV SURFACE AG SERPL QL IA: NORMAL
HCV AB SER QL: NORMAL
HIV 1+2 AB+HIV1 P24 AG SERPL QL IA: NORMAL
N GONORRHOEA RRNA SPEC QL NAA+PROBE: NOT DETECTED
T VAGINALIS DNA VAG QL PROBE+SIG AMP: NEGATIVE

## 2024-10-02 PROCEDURE — 87510 GARDNER VAG DNA DIR PROBE: CPT | Performed by: NURSE PRACTITIONER

## 2024-10-02 PROCEDURE — 99214 OFFICE O/P EST MOD 30 MIN: CPT | Performed by: NURSE PRACTITIONER

## 2024-10-02 PROCEDURE — 86695 HERPES SIMPLEX TYPE 1 TEST: CPT

## 2024-10-02 PROCEDURE — 87591 N.GONORRHOEAE DNA AMP PROB: CPT | Performed by: NURSE PRACTITIONER

## 2024-10-02 PROCEDURE — 20550 NJX 1 TENDON SHEATH/LIGAMENT: CPT | Performed by: NURSE PRACTITIONER

## 2024-10-02 PROCEDURE — 36415 COLL VENOUS BLD VENIPUNCTURE: CPT

## 2024-10-02 PROCEDURE — G0432 EIA HIV-1/HIV-2 SCREEN: HCPCS

## 2024-10-02 PROCEDURE — 87491 CHLMYD TRACH DNA AMP PROBE: CPT | Performed by: NURSE PRACTITIONER

## 2024-10-02 PROCEDURE — 86592 SYPHILIS TEST NON-TREP QUAL: CPT

## 2024-10-02 PROCEDURE — 1125F AMNT PAIN NOTED PAIN PRSNT: CPT | Performed by: NURSE PRACTITIONER

## 2024-10-02 PROCEDURE — 87340 HEPATITIS B SURFACE AG IA: CPT

## 2024-10-02 PROCEDURE — 86803 HEPATITIS C AB TEST: CPT

## 2024-10-02 PROCEDURE — 87660 TRICHOMONAS VAGIN DIR PROBE: CPT | Performed by: NURSE PRACTITIONER

## 2024-10-02 PROCEDURE — 87480 CANDIDA DNA DIR PROBE: CPT | Performed by: NURSE PRACTITIONER

## 2024-10-02 PROCEDURE — 86696 HERPES SIMPLEX TYPE 2 TEST: CPT

## 2024-10-02 RX ORDER — LIDOCAINE HYDROCHLORIDE 20 MG/ML
0.25 INJECTION, SOLUTION INFILTRATION; PERINEURAL
Status: COMPLETED | OUTPATIENT
Start: 2024-10-02 | End: 2024-10-02

## 2024-10-02 RX ORDER — TRIAMCINOLONE ACETONIDE 40 MG/ML
10 INJECTION, SUSPENSION INTRA-ARTICULAR; INTRAMUSCULAR
Status: COMPLETED | OUTPATIENT
Start: 2024-10-02 | End: 2024-10-02

## 2024-10-02 RX ADMIN — LIDOCAINE HYDROCHLORIDE 0.25 ML: 20 INJECTION, SOLUTION INFILTRATION; PERINEURAL at 11:25

## 2024-10-02 RX ADMIN — TRIAMCINOLONE ACETONIDE 10 MG: 40 INJECTION, SUSPENSION INTRA-ARTICULAR; INTRAMUSCULAR at 11:25

## 2024-10-02 NOTE — PROGRESS NOTES
Chief Complaint     Hand Pain (Patient states that her thumb pain has gotten worse and it feels like she is popping it back into place.) and std labs (Patient is requesting std labs. No known exposure)    History of Present Illness     Riri Martinez is a 24 y.o. female who presents to [facility] for evaluation of Right thumb pain, request STD check.        Pt with continued R thumb pain/stiffness/locking. Pt is worse as the day goes on. Wear brace at night. Takes ibu for treatment. XR 9/10/24 unremarkable. No recent injury.   Pt would like tested for STD's d/t possible exposure. No known exposure. Denies rash, vaginal bleeding, lesions, fever, chills, or other.      History      Past Medical History:   Diagnosis Date    Allergic     Anemia     Anxiety     Arthritis     Chronic back pain     Depression     Endometriosis     Endometriosis determined by laparoscopy 2023    History of tethered spinal cord     HAD SURGICAL INTERVENTION. DENIES ANY CURRENT ISSUES    Migraine     Pelvic pain     Seasonal allergies        Past Surgical History:   Procedure Laterality Date    BACK SURGERY      tethered cord     SECTION       SECTION N/A 2022    Procedure:  SECTION REPEAT;  Surgeon: Rudy Alcantar MD;  Location: [facility] LABOR DELIVERY;  Service: Gynecology;  Laterality: N/A;    DIAGNOSTIC LAPAROSCOPY N/A 2023    Procedure: DIAGNOSTIC LAPAROSCOPY WITH EXCISION OF ENDOMETRIOSIS;  Surgeon: Jackson Akbar MD;  Location: [facility] MAIN OR;  Service: Gynecology;  Laterality: N/A;    HIP SURGERY Left     HIP SURGERY      REPAIR PATELLAR TENDON Bilateral        Family History   Problem Relation Age of Onset    Diabetes Mother     Breast cancer Maternal Grandmother     Stroke Maternal Grandmother     Ovarian cancer Neg Hx     Uterine cancer Neg Hx     Colon cancer Neg Hx     Deep vein thrombosis Neg Hx     Pulmonary embolism Neg Hx         Current  Medications        Current Outpatient Medications:     cetirizine (zyrTEC) 10 MG tablet, Take 1 tablet by mouth Daily., Disp: 90 tablet, Rfl: 1    ferrous sulfate 325 (65 FE) MG tablet, Take 1 tablet by mouth Daily With Breakfast., Disp: 30 tablet, Rfl: 5    medroxyPROGESTERone Acetate 150 MG/ML suspension prefilled syringe, Inject 1 mL into the appropriate muscle as directed by prescriber Every 3 (Three) Months., Disp: 1 mL, Rfl: 2    meloxicam (MOBIC) 15 MG tablet, Take 1 tablet by mouth Daily., Disp: 30 tablet, Rfl: 3    topiramate (Topamax) 50 MG tablet, Take 1 tablet by mouth 2 (Two) Times a Day., Disp: 60 tablet, Rfl: 2    vitamin D (ERGOCALCIFEROL) 1.25 MG (92620 UT) capsule capsule, Take 1 capsule by mouth 1 (One) Time Per Week., Disp: 12 capsule, Rfl: 1    Current Facility-Administered Medications:     medroxyPROGESTERone (DEPO-PROVERA) injection 150 mg, 150 mg, Intramuscular, Once, Dorothy Oglesby APRN     Allergies     Allergies   Allergen Reactions    Latex Unknown - High Severity     REPORTS HAS BEEN INSTRUCTED SINCE HX OF TETHERED SPINAL CORD TO LIST LATEX AS CONTRAINDICATION    Adhesive Tape Rash     NO ISSUES WITH PAPER TAPE       Social History       Social History     Social History Narrative    Not on file       Immunizations     Immunization:  Immunization History   Administered Date(s) Administered    FluMist 2-49yrs (Nasal) 09/24/2009    Fluzone  >6mos 10/18/2013    Fluzone (or Fluarix & Flulaval for VFC) >6mos 12/30/2019    HPV Quadrivalent 03/30/2009, 07/14/2009, 11/17/2009    Hep A, 2 Dose 10/31/2013, 07/22/2015    Influenza, Unspecified 12/30/2019    Meningococcal MCV4P (Menactra) 07/22/2015    Meningococcal Polysaccharide 03/30/2009    Tdap 03/30/2009, 12/30/2019    Varicella 07/14/2009          Objective     Objective     Vital Signs:   /65 (BP Location: Left arm, Patient Position: Sitting, Cuff Size: Adult)   Pulse 88   Temp 97.4 °F (36.3 °C) (Temporal)   Ht 149.9 cm  "(59\")   Wt 55.3 kg (122 lb)   SpO2 100%   BMI 24.64 kg/m²       Physical Exam  Vitals reviewed.   Constitutional:       General: She is not in acute distress.     Appearance: Normal appearance.   HENT:      Head: Normocephalic.      Right Ear: Tympanic membrane normal.      Left Ear: Tympanic membrane normal.      Nose: Nose normal.      Mouth/Throat:      Pharynx: Oropharynx is clear. No posterior oropharyngeal erythema.   Eyes:      General: No scleral icterus.     Extraocular Movements: Extraocular movements intact.      Conjunctiva/sclera: Conjunctivae normal.      Pupils: Pupils are equal, round, and reactive to light.   Cardiovascular:      Rate and Rhythm: Normal rate and regular rhythm.      Pulses: Normal pulses.      Heart sounds: Normal heart sounds.   Pulmonary:      Effort: Pulmonary effort is normal.      Breath sounds: Normal breath sounds.   Abdominal:      General: Bowel sounds are normal.      Palpations: Abdomen is soft.   Musculoskeletal:      Right hand: Tenderness present. No deformity. Decreased range of motion.      Cervical back: Neck supple.      Comments: Tender nodule at base of thumb     Skin:     General: Skin is warm and dry.   Neurological:      Mental Status: She is alert and oriented to person, place, and time.   Psychiatric:         Mood and Affect: Mood normal.         Behavior: Behavior normal.         Thought Content: Thought content normal.         Judgment: Judgment normal.         Results      Result Review :     Common labs          5/3/2024    11:01   Common Labs   Glucose 93    BUN 10    Creatinine 0.64    Sodium 139    Potassium 3.9    Chloride 106    Calcium 9.5    Albumin 4.6    Total Bilirubin 0.7    Alkaline Phosphatase 82    AST (SGOT) 20    ALT (SGPT) 25    WBC 5.22    Hemoglobin 12.3    Hematocrit 38.9    Platelets 213    Total Cholesterol 147    Triglycerides 58    HDL Cholesterol 34    LDL Cholesterol  101      XR Hand 3+ View Right (09/10/2024 14:49)     - " Injection Tendon or Ligament    Date/Time: 10/2/2024 11:25 AM    Performed by: Dorothy Oglesby APRN  Authorized by: Dorothy Oglesby APRN  Preparation: Patient was prepped and draped in the usual sterile fashion.  Local anesthesia used: no    Anesthesia:  Local anesthesia used: no    Sedation:  Patient sedated: no    Patient tolerance: patient tolerated the procedure well with no immediate complications  Medications administered: 10 mg triamcinolone acetonide 40 MG/ML; 0.25 mL lidocaine 2%        Assessment and Plan        Assessment and Plan    Diagnoses and all orders for this visit:    1. Possible exposure to STD (Primary)  Comments:  STD testing today   Condom use during every sexual encounter  Orders:  -     RPR, Rfx Qn RPR / Confirm TP; Future  -     Hepatitis B Surface Antigen; Future  -     Hepatitis C Antibody; Future  -     HIV-1 / O / 2 Ag / Antibody 4th Generation; Future  -     HSV 1 & 2 - Specific Antibody, IgG; Future  -     Chlamydia trachomatis, Neisseria gonorrhoeae, PCR - Swab, Cervix; Future  -     Gardnerella vaginalis, Trichomonas vaginalis, Candida albicans, DNA - Swab, Vagina  -     Chlamydia trachomatis, Neisseria gonorrhoeae, PCR - Swab, Cervix    2. Pain of right thumb    3. Trigger finger of right thumb    Other orders  -     - Injection Tendon or Ligament              Follow Up        Follow Up   Return if symptoms worsen or fail to improve.  Patient was given instructions and counseling regarding her condition or for health maintenance advice. Please see specific information pulled into the AVS if appropriate.

## 2024-10-04 LAB
HSV1 IGG SER IA-ACNC: 47.7 INDEX (ref 0–0.9)
HSV2 IGG SER IA-ACNC: <0.91 INDEX (ref 0–0.9)
RPR SER QL: NON REACTIVE

## 2024-11-26 ENCOUNTER — CLINICAL SUPPORT (OUTPATIENT)
Dept: FAMILY MEDICINE CLINIC | Facility: CLINIC | Age: 24
End: 2024-11-26
Payer: MEDICAID

## 2024-11-26 DIAGNOSIS — Z30.42 ENCOUNTER FOR DEPO-PROVERA CONTRACEPTION: Primary | ICD-10-CM

## 2024-11-26 PROCEDURE — 96372 THER/PROPH/DIAG INJ SC/IM: CPT | Performed by: NURSE PRACTITIONER

## 2024-11-26 RX ORDER — MEDROXYPROGESTERONE ACETATE 150 MG/ML
150 INJECTION, SUSPENSION INTRAMUSCULAR
Status: SHIPPED | OUTPATIENT
Start: 2024-11-26

## 2024-11-26 RX ADMIN — MEDROXYPROGESTERONE ACETATE 150 MG: 150 INJECTION, SUSPENSION INTRAMUSCULAR at 12:47

## 2025-01-14 ENCOUNTER — OFFICE VISIT (OUTPATIENT)
Dept: FAMILY MEDICINE CLINIC | Facility: CLINIC | Age: 25
End: 2025-01-14
Payer: MEDICAID

## 2025-01-14 VITALS
DIASTOLIC BLOOD PRESSURE: 60 MMHG | SYSTOLIC BLOOD PRESSURE: 105 MMHG | HEART RATE: 89 BPM | RESPIRATION RATE: 18 BRPM | TEMPERATURE: 98.2 F | HEIGHT: 59 IN | OXYGEN SATURATION: 98 % | WEIGHT: 120 LBS | BODY MASS INDEX: 24.19 KG/M2

## 2025-01-14 DIAGNOSIS — K04.7 DENTAL ABSCESS: ICD-10-CM

## 2025-01-14 DIAGNOSIS — H92.02 LEFT EAR PAIN: ICD-10-CM

## 2025-01-14 DIAGNOSIS — J02.9 SORE THROAT: Primary | ICD-10-CM

## 2025-01-14 DIAGNOSIS — G43.809 OTHER MIGRAINE WITHOUT STATUS MIGRAINOSUS, NOT INTRACTABLE: ICD-10-CM

## 2025-01-14 LAB
EXPIRATION DATE: NORMAL
INTERNAL CONTROL: NORMAL
Lab: NORMAL
S PYO AG THROAT QL: NEGATIVE

## 2025-01-14 PROCEDURE — 99214 OFFICE O/P EST MOD 30 MIN: CPT | Performed by: NURSE PRACTITIONER

## 2025-01-14 PROCEDURE — 1159F MED LIST DOCD IN RCRD: CPT | Performed by: NURSE PRACTITIONER

## 2025-01-14 PROCEDURE — 1160F RVW MEDS BY RX/DR IN RCRD: CPT | Performed by: NURSE PRACTITIONER

## 2025-01-14 PROCEDURE — 1125F AMNT PAIN NOTED PAIN PRSNT: CPT | Performed by: NURSE PRACTITIONER

## 2025-01-14 PROCEDURE — 87880 STREP A ASSAY W/OPTIC: CPT | Performed by: NURSE PRACTITIONER

## 2025-01-14 RX ORDER — AMOXICILLIN 500 MG/1
500 CAPSULE ORAL 2 TIMES DAILY
Qty: 28 CAPSULE | Refills: 0 | Status: SHIPPED | OUTPATIENT
Start: 2025-01-14 | End: 2025-01-28

## 2025-01-14 NOTE — PROGRESS NOTES
Chief Complaint     Earache (Left ear.) and Sore Throat (X1 day)    History of Present Illness     Riri Martinez is a 25 y.o. female who presents to [facility] for evaluation of sore throat, left ear pain x 1 day. States she has been having dental issues for >1 month but has been unable to be seen by a dentist d/t insurance. Pt has changed insurance plans which will go into effect 25. Denies fever, chills, SOB, chest pain, n/v/d, HA, or other. Has not taken anything to treat. No known sick contacts.            History      Past Medical History:   Diagnosis Date    Allergic     Anemia     Anxiety     Arthritis     Chronic back pain     Depression     Endometriosis     Endometriosis determined by laparoscopy 2023    History of tethered spinal cord     HAD SURGICAL INTERVENTION. DENIES ANY CURRENT ISSUES    Migraine     Pelvic pain     Seasonal allergies        Past Surgical History:   Procedure Laterality Date    BACK SURGERY      tethered cord     SECTION       SECTION N/A 2022    Procedure:  SECTION REPEAT;  Surgeon: Rudy Alcantar MD;  Location: [facility] LABOR DELIVERY;  Service: Gynecology;  Laterality: N/A;    DIAGNOSTIC LAPAROSCOPY N/A 2023    Procedure: DIAGNOSTIC LAPAROSCOPY WITH EXCISION OF ENDOMETRIOSIS;  Surgeon: Jackson Akbar MD;  Location: [facility] MAIN OR;  Service: Gynecology;  Laterality: N/A;    HIP SURGERY Left     HIP SURGERY      REPAIR PATELLAR TENDON Bilateral        Family History   Problem Relation Age of Onset    Diabetes Mother     Breast cancer Maternal Grandmother     Stroke Maternal Grandmother     Ovarian cancer Neg Hx     Uterine cancer Neg Hx     Colon cancer Neg Hx     Deep vein thrombosis Neg Hx     Pulmonary embolism Neg Hx         Current Medications        Current Outpatient Medications:     cetirizine (zyrTEC) 10 MG tablet, Take 1 tablet by mouth Daily., Disp: 90 tablet, Rfl: 1    ferrous  sulfate 325 (65 FE) MG tablet, Take 1 tablet by mouth Daily With Breakfast., Disp: 30 tablet, Rfl: 5    medroxyPROGESTERone Acetate 150 MG/ML suspension prefilled syringe, Inject 1 mL into the appropriate muscle as directed by prescriber Every 3 (Three) Months., Disp: 1 mL, Rfl: 2    meloxicam (MOBIC) 15 MG tablet, Take 1 tablet by mouth Daily., Disp: 30 tablet, Rfl: 3    topiramate (Topamax) 50 MG tablet, Take 1 tablet by mouth 2 (Two) Times a Day., Disp: 60 tablet, Rfl: 2    vitamin D (ERGOCALCIFEROL) 1.25 MG (32710 UT) capsule capsule, Take 1 capsule by mouth 1 (One) Time Per Week., Disp: 12 capsule, Rfl: 1    amoxicillin (AMOXIL) 500 MG capsule, Take 1 capsule by mouth 2 (Two) Times a Day for 14 days., Disp: 28 capsule, Rfl: 0    Lidocaine 4 % solution, Apply 1 Application topically Every 4 (Four) Hours As Needed (dental pain)., Disp: 50 mL, Rfl: 0    Current Facility-Administered Medications:     medroxyPROGESTERone (DEPO-PROVERA) injection 150 mg, 150 mg, Intramuscular, Q3 Months, Dorothy Oglesby, PHANI     Allergies     Allergies   Allergen Reactions    Latex Unknown - High Severity     REPORTS HAS BEEN INSTRUCTED SINCE HX OF TETHERED SPINAL CORD TO LIST LATEX AS CONTRAINDICATION    Adhesive Tape Rash     NO ISSUES WITH PAPER TAPE       Social History       Social History     Social History Narrative    Not on file       Immunizations     Immunization:  Immunization History   Administered Date(s) Administered    FluMist 2-49yrs (Nasal) 09/24/2009    Fluzone  >6mos 10/18/2013    Fluzone (or Fluarix & Flulaval for VFC) >6mos 12/30/2019    HPV Quadrivalent 03/30/2009, 07/14/2009, 11/17/2009    Hep A, 2 Dose 10/31/2013, 07/22/2015    Influenza, Unspecified 12/30/2019    Meningococcal MCV4P (Menactra) 07/22/2015    Meningococcal Polysaccharide 03/30/2009    Tdap 03/30/2009, 12/30/2019    Varicella 07/14/2009          Objective     Objective     Vital Signs:   /60 (BP Location: Left arm, Patient  "Position: Sitting, Cuff Size: Adult)   Pulse 89   Temp 98.2 °F (36.8 °C) (Temporal)   Resp 18   Ht 149.9 cm (59\")   Wt 54.4 kg (120 lb)   SpO2 98%   BMI 24.24 kg/m²       Physical Exam  Vitals reviewed.   Constitutional:       General: She is not in acute distress.     Appearance: Normal appearance.   HENT:      Head: Normocephalic.      Right Ear: Tympanic membrane normal.      Left Ear: Tympanic membrane normal. Tenderness present. No swelling. Tympanic membrane is not erythematous.      Nose: Nose normal.      Mouth/Throat:      Dentition: Dental abscesses present.      Pharynx: Oropharynx is clear. No posterior oropharyngeal erythema.      Comments: Left posterior erythema/edema   Eyes:      General: No scleral icterus.     Extraocular Movements: Extraocular movements intact.      Conjunctiva/sclera: Conjunctivae normal.      Pupils: Pupils are equal, round, and reactive to light.   Cardiovascular:      Rate and Rhythm: Normal rate and regular rhythm.      Pulses: Normal pulses.      Heart sounds: Normal heart sounds.   Pulmonary:      Effort: Pulmonary effort is normal.      Breath sounds: Normal breath sounds.   Abdominal:      General: Bowel sounds are normal.      Palpations: Abdomen is soft.   Musculoskeletal:         General: Normal range of motion.      Cervical back: Neck supple.   Skin:     General: Skin is warm and dry.   Neurological:      Mental Status: She is alert and oriented to person, place, and time.   Psychiatric:         Mood and Affect: Mood normal.         Behavior: Behavior normal.         Thought Content: Thought content normal.         Judgment: Judgment normal.         Results      Result Review :   The following data was reviewed by: PHANI Albarran on 01/14/2025:  Common labs          5/3/2024    11:01   Common Labs   Glucose 93    BUN 10    Creatinine 0.64    Sodium 139    Potassium 3.9    Chloride 106    Calcium 9.5    Albumin 4.6    Total Bilirubin 0.7  "   Alkaline Phosphatase 82    AST (SGOT) 20    ALT (SGPT) 25    WBC 5.22    Hemoglobin 12.3    Hematocrit 38.9    Platelets 213    Total Cholesterol 147    Triglycerides 58    HDL Cholesterol 34    LDL Cholesterol  101             Assessment and Plan        Assessment and Plan    Diagnoses and all orders for this visit:    1. Sore throat (Primary)  Comments:  Rapid strep negative   tyl/ibu UAD prn  Orders:  -     POCT rapid strep A    2. Left ear pain  Comments:  tyl/ibu UAD prn    3. Dental abscess  Assessment & Plan:  Amox 500mg PO bid x 14 days   Lidocaine solution UAD prn   Advised to make appt with dentist as soon as able        4. Other migraine without status migrainosus, not intractable  Assessment & Plan:  Headaches are stable.    Plan:  Continue same medication/s without change.     Discussed medication dosage, use, side effects, and goals of treatment in detail.    Discussed monitoring symptoms and use of quick-relief medications and maintenance medication.    General Treatment Goals:   symptom prevention  minimize work absence  minimizing limitation in activity  prevention of exacerbations  decrease use of ER/inpatient care  minimization of adverse effects of treatment    Followup at the next regular appointment                  Other orders  -     amoxicillin (AMOXIL) 500 MG capsule; Take 1 capsule by mouth 2 (Two) Times a Day for 14 days.  Dispense: 28 capsule; Refill: 0  -     Lidocaine 4 % solution; Apply 1 Application topically Every 4 (Four) Hours As Needed (dental pain).  Dispense: 50 mL; Refill: 0              Follow Up        Follow Up   No follow-ups on file.  Patient was given instructions and counseling regarding her condition or for health maintenance advice. Please see specific information pulled into the AVS if appropriate.

## 2025-01-14 NOTE — ASSESSMENT & PLAN NOTE
Amox 500mg PO bid x 14 days   Lidocaine solution UAD prn   Advised to make appt with dentist as soon as able

## 2025-02-11 ENCOUNTER — OFFICE VISIT (OUTPATIENT)
Dept: FAMILY MEDICINE CLINIC | Facility: CLINIC | Age: 25
End: 2025-02-11
Payer: COMMERCIAL

## 2025-02-11 VITALS
RESPIRATION RATE: 17 BRPM | WEIGHT: 120.2 LBS | SYSTOLIC BLOOD PRESSURE: 111 MMHG | BODY MASS INDEX: 24.23 KG/M2 | TEMPERATURE: 97.8 F | OXYGEN SATURATION: 100 % | HEART RATE: 86 BPM | DIASTOLIC BLOOD PRESSURE: 63 MMHG | HEIGHT: 59 IN

## 2025-02-11 DIAGNOSIS — F41.1 GAD (GENERALIZED ANXIETY DISORDER): ICD-10-CM

## 2025-02-11 DIAGNOSIS — R00.2 RAPID PALPITATIONS: Primary | ICD-10-CM

## 2025-02-11 PROBLEM — K04.7 DENTAL ABSCESS: Status: RESOLVED | Noted: 2025-01-14 | Resolved: 2025-02-11

## 2025-02-11 PROCEDURE — 96372 THER/PROPH/DIAG INJ SC/IM: CPT | Performed by: FAMILY MEDICINE

## 2025-02-11 PROCEDURE — 93000 ELECTROCARDIOGRAM COMPLETE: CPT | Performed by: FAMILY MEDICINE

## 2025-02-11 PROCEDURE — 1126F AMNT PAIN NOTED NONE PRSNT: CPT | Performed by: FAMILY MEDICINE

## 2025-02-11 PROCEDURE — 99214 OFFICE O/P EST MOD 30 MIN: CPT | Performed by: FAMILY MEDICINE

## 2025-02-11 RX ORDER — IBUPROFEN 600 MG/1
600 TABLET, FILM COATED ORAL EVERY 6 HOURS PRN
COMMUNITY

## 2025-02-11 RX ORDER — PROPRANOLOL HCL 20 MG
20 TABLET ORAL 3 TIMES DAILY PRN
Qty: 90 TABLET | Refills: 3 | Status: SHIPPED | OUTPATIENT
Start: 2025-02-11

## 2025-02-11 RX ADMIN — MEDROXYPROGESTERONE ACETATE 150 MG: 150 INJECTION, SUSPENSION INTRAMUSCULAR at 14:30

## 2025-02-11 NOTE — PROGRESS NOTES
"Chief Complaint  Chest Pain (Pt reports that she used to take propanolol for tachycardia. States that [place] out of [place] and hasn't been on medication for years. Reports that she hasn't had any issues until last Friday at work when she felt her heart start to race. Requesting a new RX if possible.) and Rapid Heart Rate    Subjective        Riri Martinez presents to [facility]  History of Present Illness  Pt presents with complaints of intermittent rapid heart rate with palpitations. Reports that she used to take propranolol for these symptoms several years ago and would like to get back on it.      Denies n/v/d, dizziness, SOB, chest pain or other.      Pt to receive depo inj today.      Reviewed all recent labs and medications        Objective   Vital Signs:  /63 (BP Location: Right arm, Patient Position: Sitting, Cuff Size: Adult)   Pulse 86   Temp 97.8 °F (36.6 °C) (Temporal)   Resp 17   Ht 149.9 cm (59.02\")   Wt 54.5 kg (120 lb 3.2 oz)   SpO2 100%   BMI 24.26 kg/m²   Estimated body mass index is 24.26 kg/m² as calculated from the following:    Height as of this encounter: 149.9 cm (59.02\").    Weight as of this encounter: 54.5 kg (120 lb 3.2 oz).    BMI is within normal parameters. No other follow-up for BMI required.      Physical Exam  Vitals reviewed.   Constitutional:       Appearance: Normal appearance. She is normal weight.   HENT:      Head: Normocephalic.      Right Ear: External ear normal.      Left Ear: External ear normal.      Nose: Nose normal.      Mouth/Throat:      Mouth: Mucous membranes are moist.   Eyes:      Conjunctiva/sclera: Conjunctivae normal.   Cardiovascular:      Rate and Rhythm: Normal rate and regular rhythm. Occasional Extrasystoles are present.     Pulses: Normal pulses.      Heart sounds: Normal heart sounds.   Pulmonary:      Effort: Pulmonary effort is normal.      Breath sounds: Normal breath sounds.   Musculoskeletal:  "        General: Normal range of motion.      Cervical back: Normal range of motion.      Right lower leg: No edema.      Left lower leg: No edema.   Skin:     General: Skin is warm and dry.      Coloration: Skin is pale.   Neurological:      General: No focal deficit present.      Mental Status: She is alert and oriented to person, place, and time.   Psychiatric:         Mood and Affect: Mood normal.         Behavior: Behavior normal.        Result Review :    CMP          5/3/2024    11:01   CMP   Glucose 93    BUN 10    Creatinine 0.64    EGFR 125.2    Sodium 139    Potassium 3.9    Chloride 106    Calcium 9.5    Total Protein 7.3    Albumin 4.6    Globulin 2.7    Total Bilirubin 0.7    Alkaline Phosphatase 82    AST (SGOT) 20    ALT (SGPT) 25    Albumin/Globulin Ratio 1.7    BUN/Creatinine Ratio 15.6    Anion Gap 10.2      CBC          5/3/2024    11:01   CBC   WBC 5.22    RBC 4.49    Hemoglobin 12.3    Hematocrit 38.9    MCV 86.6    MCH 27.4    MCHC 31.6    RDW 14.0    Platelets 213      Lipid Panel          5/3/2024    11:01   Lipid Panel   Total Cholesterol 147    Triglycerides 58    HDL Cholesterol 34    VLDL Cholesterol 12    LDL Cholesterol  101    LDL/HDL Ratio 2.98      TSH          5/3/2024    11:01   TSH   TSH 2.140                Assessment and Plan   Diagnoses and all orders for this visit:    1. Rapid palpitations (Primary)  Comments:  EKG today in the clinic.  It was normal.  No sign of any VT interval abnormality or QT abnormality. Most likely symptoms having PVCs.  Propranolol prescribed.  Assessment & Plan:  Will start pt back on Propranolol for rapid heart rate PRN.    Orders:  -     propranolol (INDERAL) 20 MG tablet; Take 1 tablet by mouth 3 (Three) Times a Day As Needed (Rapid heart rate).  Dispense: 90 tablet; Refill: 3  -     ECG 12 Lead    2. CAIO (generalized anxiety disorder)  Assessment & Plan:  Psychological condition is worsening.  Medication changes per orders.  Psychological  condition  will be reassessed in 6 months.                 Follow Up   No follow-ups on file.  Patient was given instructions and counseling regarding her condition or for health maintenance advice. Please see specific information pulled into the AVS if appropriate.

## 2025-02-17 PROBLEM — Z00.00 ANNUAL PHYSICAL EXAM: Status: RESOLVED | Noted: 2023-06-06 | Resolved: 2025-02-17

## 2025-04-25 ENCOUNTER — OFFICE VISIT (OUTPATIENT)
Dept: FAMILY MEDICINE CLINIC | Facility: CLINIC | Age: 25
End: 2025-04-25
Payer: COMMERCIAL

## 2025-04-25 VITALS
HEIGHT: 59 IN | HEART RATE: 89 BPM | BODY MASS INDEX: 25 KG/M2 | TEMPERATURE: 97.8 F | WEIGHT: 124 LBS | RESPIRATION RATE: 17 BRPM | DIASTOLIC BLOOD PRESSURE: 69 MMHG | SYSTOLIC BLOOD PRESSURE: 109 MMHG | OXYGEN SATURATION: 99 %

## 2025-04-25 DIAGNOSIS — M25.539 PAIN IN WRIST, UNSPECIFIED LATERALITY: ICD-10-CM

## 2025-04-25 DIAGNOSIS — M79.644 PAIN OF RIGHT THUMB: Primary | ICD-10-CM

## 2025-04-25 DIAGNOSIS — F41.1 GAD (GENERALIZED ANXIETY DISORDER): Chronic | ICD-10-CM

## 2025-04-25 DIAGNOSIS — F32.A DEPRESSION, UNSPECIFIED DEPRESSION TYPE: ICD-10-CM

## 2025-04-25 RX ORDER — KETOROLAC TROMETHAMINE 30 MG/ML
60 INJECTION, SOLUTION INTRAMUSCULAR; INTRAVENOUS ONCE
Status: COMPLETED | OUTPATIENT
Start: 2025-04-25 | End: 2025-04-25

## 2025-04-25 RX ORDER — CYCLOBENZAPRINE HCL 10 MG
10 TABLET ORAL 3 TIMES DAILY PRN
Qty: 30 TABLET | Refills: 0 | Status: SHIPPED | OUTPATIENT
Start: 2025-04-25

## 2025-04-25 RX ORDER — ESCITALOPRAM OXALATE 10 MG/1
10 TABLET ORAL DAILY
Qty: 30 TABLET | Refills: 2 | Status: SHIPPED | OUTPATIENT
Start: 2025-04-25

## 2025-04-25 RX ORDER — BUSPIRONE HYDROCHLORIDE 7.5 MG/1
7.5 TABLET ORAL 2 TIMES DAILY
Qty: 60 TABLET | Refills: 2 | Status: SHIPPED | OUTPATIENT
Start: 2025-04-25

## 2025-04-25 RX ADMIN — KETOROLAC TROMETHAMINE 60 MG: 30 INJECTION, SOLUTION INTRAMUSCULAR; INTRAVENOUS at 14:26

## 2025-04-25 NOTE — ASSESSMENT & PLAN NOTE
Start buspar 7.5mg PO bid   Discussed all med SE/AEs including all BB warnings, if these occur, pt will DC medication immediately, notify office, and proceed to ED.   FU 1 month

## 2025-04-25 NOTE — PROGRESS NOTES
Chief Complaint     Hand Pain (Pt states that her carpal tunnel has been bothering her the past week. States that it is worse at night time. Starting to bother her shoulder and elbow at night ) and trigger finger (Started out the thumb as a trigger finger but is also in her middle finger had worsen over the past week. States that it will keep her up at night. )    History of Present Illness     Riri Martinez is a 25 y.o. female who presents to [facility] for evaluation of .          History of Present Illness  The patient is a 25-year-old female who presents with complaints of trigger finger.    Severe discomfort in the right middle finger is reported, particularly at night, which has escalated to the point of triggering. The pain radiates up to the elbow and shoulder. This is her dominant hand, and it is barely used due to the pain. Sleep is limited to 2 to 3 hours per night due to the pain, leading to feelings of depression. A brace is used for support but cannot be worn during work hours due to the requirement of wearing gloves. Mobic has been resumed in an attempt to alleviate the symptoms. An injection in the hand was received during the last visit in September or October 2024, which provided relief until recently.    Propranolol is currently taken once daily for anxiety but has not resulted in significant improvement. No medication for anxiety has been taken since 2020 and prior to that, during high school years. No specific medication for depression such as Lexapro, Zoloft, Prozac, or Paxil is recalled. No suicidal ideation or self-harm tendencies are reported.    FAMILY HISTORY  Carpal tunnel runs in the family. Her mother had carpal tunnel in both wrists and underwent surgeries.          History      Past Medical History:   Diagnosis Date    Allergic     Anemia     Anxiety     Arthritis     Chronic back pain     Depression     Endometriosis     Endometriosis  determined by laparoscopy 2023    History of tethered spinal cord     HAD SURGICAL INTERVENTION. DENIES ANY CURRENT ISSUES    Migraine     Pelvic pain     Seasonal allergies        Past Surgical History:   Procedure Laterality Date    BACK SURGERY      tethered cord     SECTION  2019     SECTION N/A 2022    Procedure:  SECTION REPEAT;  Surgeon: Rudy Alcantar MD;  Location: [facility] LABOR DELIVERY;  Service: Gynecology;  Laterality: N/A;    DIAGNOSTIC LAPAROSCOPY N/A 2023    Procedure: DIAGNOSTIC LAPAROSCOPY WITH EXCISION OF ENDOMETRIOSIS;  Surgeon: Jackson Akbar MD;  Location: [facility] MAIN OR;  Service: Gynecology;  Laterality: N/A;    HIP SURGERY Left     2 surgeries to same side    REPAIR PATELLAR TENDON Bilateral        Family History   Problem Relation Age of Onset    Diabetes Mother     Breast cancer Maternal Grandmother     Stroke Maternal Grandmother     Ovarian cancer Neg Hx     Uterine cancer Neg Hx     Colon cancer Neg Hx     Deep vein thrombosis Neg Hx     Pulmonary embolism Neg Hx         Current Medications        Current Outpatient Medications:     cetirizine (zyrTEC) 10 MG tablet, Take 1 tablet by mouth Daily., Disp: 90 tablet, Rfl: 1    ferrous sulfate 325 (65 FE) MG tablet, Take 1 tablet by mouth Daily With Breakfast., Disp: 30 tablet, Rfl: 5    ibuprofen (ADVIL,MOTRIN) 600 MG tablet, Take 1 tablet by mouth Every 6 (Six) Hours As Needed for Mild Pain., Disp: , Rfl:     medroxyPROGESTERone Acetate 150 MG/ML suspension prefilled syringe, Inject 1 mL into the appropriate muscle as directed by prescriber Every 3 (Three) Months., Disp: 1 mL, Rfl: 2    meloxicam (MOBIC) 15 MG tablet, Take 1 tablet by mouth Daily., Disp: 30 tablet, Rfl: 3    propranolol (INDERAL) 20 MG tablet, Take 1 tablet by mouth 3 (Three) Times a Day As Needed (Rapid heart rate)., Disp: 90 tablet, Rfl: 3    topiramate (Topamax) 50 MG tablet, Take 1 tablet by mouth 2 (Two) Times a  "Day., Disp: 60 tablet, Rfl: 2    vitamin D (ERGOCALCIFEROL) 1.25 MG (29587 UT) capsule capsule, Take 1 capsule by mouth 1 (One) Time Per Week., Disp: 12 capsule, Rfl: 1    busPIRone (BUSPAR) 7.5 MG tablet, Take 1 tablet by mouth 2 (Two) Times a Day., Disp: 60 tablet, Rfl: 2    cyclobenzaprine (FLEXERIL) 10 MG tablet, Take 1 tablet by mouth 3 (Three) Times a Day As Needed for Muscle Spasms., Disp: 30 tablet, Rfl: 0    escitalopram (Lexapro) 10 MG tablet, Take 1 tablet by mouth Daily., Disp: 30 tablet, Rfl: 2    Current Facility-Administered Medications:     ketorolac (TORADOL) injection 60 mg, 60 mg, Intramuscular, Once, Dorothy Oglesby APRN    medroxyPROGESTERone (DEPO-PROVERA) injection 150 mg, 150 mg, Intramuscular, Q3 Months, Dorothy Oglesby APRN, 150 mg at 02/11/25 1430     Allergies     Allergies   Allergen Reactions    Latex Unknown - High Severity     REPORTS HAS BEEN INSTRUCTED SINCE HX OF TETHERED SPINAL CORD TO LIST LATEX AS CONTRAINDICATION    Adhesive Tape Rash     NO ISSUES WITH PAPER TAPE       Social History       Social History     Social History Narrative    Not on file       Immunizations     Immunization:  Immunization History   Administered Date(s) Administered    FluMist 2-49yrs (Nasal) 09/24/2009    Fluzone  >6mos 10/18/2013    Fluzone (or Fluarix & Flulaval for VFC) >6mos 12/30/2019    HPV Quadrivalent 03/30/2009, 07/14/2009, 11/17/2009    Hep A, 2 Dose 10/31/2013, 07/22/2015    Influenza, Unspecified 12/30/2019    Meningococcal MCV4P (Menactra) 07/22/2015    Meningococcal Polysaccharide 03/30/2009    Tdap 03/30/2009, 12/30/2019    Varicella 07/14/2009          Objective     Objective     Vital Signs:   /69 (BP Location: Right arm, Patient Position: Sitting, Cuff Size: Adult)   Pulse 89   Temp 97.8 °F (36.6 °C) (Temporal)   Resp 17   Ht 149.9 cm (59.02\")   Wt 56.2 kg (124 lb)   SpO2 99%   BMI 25.03 kg/m²       Physical Exam  Vitals reviewed.   Constitutional:     "   General: She is not in acute distress.  HENT:      Head: Normocephalic.      Right Ear: Tympanic membrane normal.      Left Ear: Tympanic membrane normal.      Nose: Nose normal.      Mouth/Throat:      Pharynx: Oropharynx is clear. No posterior oropharyngeal erythema.   Eyes:      General: No scleral icterus.     Extraocular Movements: Extraocular movements intact.      Conjunctiva/sclera: Conjunctivae normal.      Pupils: Pupils are equal, round, and reactive to light.   Cardiovascular:      Rate and Rhythm: Normal rate and regular rhythm.      Pulses: Normal pulses.      Heart sounds: Normal heart sounds.   Pulmonary:      Effort: Pulmonary effort is normal.      Breath sounds: Normal breath sounds.   Abdominal:      General: Bowel sounds are normal.      Palpations: Abdomen is soft.   Musculoskeletal:         General: Normal range of motion.      Right wrist: Tenderness and bony tenderness present.      Right hand: Tenderness and bony tenderness present.      Cervical back: Neck supple.   Skin:     General: Skin is warm and dry.   Neurological:      Mental Status: She is alert and oriented to person, place, and time.   Psychiatric:         Mood and Affect: Mood normal.         Behavior: Behavior normal.         Thought Content: Thought content normal.         Judgment: Judgment normal.         Physical Exam  Musculoskeletal: Tenderness in the right middle finger extending to the elbow and shoulder      Results      Result Review :   The following data was reviewed by: PHANI Albarran on 04/25/2025:  Common labs          5/3/2024    11:01   Common Labs   Glucose 93    BUN 10    Creatinine 0.64    Sodium 139    Potassium 3.9    Chloride 106    Calcium 9.5    Albumin 4.6    Total Bilirubin 0.7    Alkaline Phosphatase 82    AST (SGOT) 20    ALT (SGPT) 25    WBC 5.22    Hemoglobin 12.3    Hematocrit 38.9    Platelets 213    Total Cholesterol 147    Triglycerides 58    HDL Cholesterol 34    LDL  Cholesterol  101          Results           Assessment and Plan        Assessment and Plan    Diagnoses and all orders for this visit:    1. Pain of right thumb (Primary)  Assessment & Plan:  - Reports significant pain in the middle finger, extending to the elbow and shoulder, particularly at night.  - EMG ordered to assess the severity of the condition.  - Toradol injection to be administered today to help with the pain.  - Advised to continue taking meloxicam and to wear the brace, although it is difficult to wear at work.    Orders:  -     ketorolac (TORADOL) injection 60 mg    2. Pain in wrist, unspecified laterality  Assessment & Plan:  - Reports significant pain in the middle finger, extending to the elbow and shoulder, particularly at night.  - EMG ordered to assess the severity of the condition.  - Toradol injection to be administered today to help with the pain.  - Advised to continue taking meloxicam and to wear the brace, although it is difficult to wear at work.    Orders:  -     EMG & Nerve Conduction Test; Future  -     ketorolac (TORADOL) injection 60 mg    3. Depression, unspecified depression type  Assessment & Plan:  - Reports feeling depressed due to lack of sleep and pain.  - Lexapro 10 mg once daily and BuSpar 10 mg twice daily prescribed to manage anxiety and depression.  - Advised to continue taking propranolol 10 mg three times a day as needed.  - Prescriptions sent to [facility] in [facility].        4. CAIO (generalized anxiety disorder)  Assessment & Plan:  Start buspar 7.5mg PO bid   Discussed all med SE/AEs including all BB warnings, if these occur, pt will DC medication immediately, notify office, and proceed to ED.   FU 1 month       Other orders  -     cyclobenzaprine (FLEXERIL) 10 MG tablet; Take 1 tablet by mouth 3 (Three) Times a Day As Needed for Muscle Spasms.  Dispense: 30 tablet; Refill: 0  -     escitalopram (Lexapro) 10 MG tablet; Take 1 tablet by mouth Daily.  Dispense: 30  tablet; Refill: 2  -     busPIRone (BUSPAR) 7.5 MG tablet; Take 1 tablet by mouth 2 (Two) Times a Day.  Dispense: 60 tablet; Refill: 2        Assessment & Plan  1. Trigger finger.    2. Anxiety.  Follow-up  - Follow-up scheduled in 1 month to evaluate the effectiveness of the treatment.    PROCEDURE  Procedure: Toradol injection, right hand    All questions were answered and agreement to proceed was given after the following Pre-Procedure details were reviewed:  - Risks and Benefits: Pain relief, potential for allergic reaction, temporary discomfort at injection site  - Alternative Options: Oral pain medications, physical therapy, other injectable medications  - Side effects: Nausea, dizziness, headache, injection site pain  - Consent: Verbal consent obtained    Intra-Procedure:  - Time-Out: Confirmed patient's identity, procedure, and site  - Site Preparation: Cleaned and disinfected the injection site  - Medication: Toradol administered    Post-Procedure:  - Tolerance Level: Tolerated the procedure well  - Home Care Instructions: Continue taking meloxicam, wear brace when possible, monitor for any adverse reactions, follow up in one month        Follow Up        Follow Up   Return in about 4 weeks (around 5/23/2025), or if symptoms worsen or fail to improve.  Patient was given instructions and counseling regarding her condition or for health maintenance advice. Please see specific information pulled into the AVS if appropriate.      Patient or patient representative verbalized consent for the use of Ambient Listening during the visit with  PHANI Albarran for chart documentation. 4/25/2025  14:33 EDT

## 2025-04-25 NOTE — ASSESSMENT & PLAN NOTE
- Reports significant pain in the middle finger, extending to the elbow and shoulder, particularly at night.  - EMG ordered to assess the severity of the condition.  - Toradol injection to be administered today to help with the pain.  - Advised to continue taking meloxicam and to wear the brace, although it is difficult to wear at work.

## 2025-04-25 NOTE — ASSESSMENT & PLAN NOTE
- Reports feeling depressed due to lack of sleep and pain.  - Lexapro 10 mg once daily and BuSpar 10 mg twice daily prescribed to manage anxiety and depression.  - Advised to continue taking propranolol 10 mg three times a day as needed.  - Prescriptions sent to [facility] in [facility].

## 2025-04-28 RX ORDER — MEDROXYPROGESTERONE ACETATE 150 MG/ML
INJECTION, SUSPENSION INTRAMUSCULAR
Qty: 1 ML | Refills: 2 | Status: SHIPPED | OUTPATIENT
Start: 2025-04-28

## 2025-04-29 ENCOUNTER — CLINICAL SUPPORT (OUTPATIENT)
Dept: FAMILY MEDICINE CLINIC | Facility: CLINIC | Age: 25
End: 2025-04-29
Payer: COMMERCIAL

## 2025-04-29 DIAGNOSIS — N94.10 DYSPAREUNIA IN FEMALE: Primary | ICD-10-CM

## 2025-04-29 PROCEDURE — 96372 THER/PROPH/DIAG INJ SC/IM: CPT | Performed by: NURSE PRACTITIONER

## 2025-04-29 RX ADMIN — MEDROXYPROGESTERONE ACETATE 150 MG: 150 INJECTION, SUSPENSION INTRAMUSCULAR at 15:31

## 2025-05-14 ENCOUNTER — OFFICE VISIT (OUTPATIENT)
Dept: FAMILY MEDICINE CLINIC | Facility: CLINIC | Age: 25
End: 2025-05-14
Payer: COMMERCIAL

## 2025-05-14 VITALS
HEART RATE: 84 BPM | SYSTOLIC BLOOD PRESSURE: 117 MMHG | DIASTOLIC BLOOD PRESSURE: 63 MMHG | RESPIRATION RATE: 16 BRPM | HEIGHT: 59 IN | TEMPERATURE: 97.6 F | OXYGEN SATURATION: 100 % | BODY MASS INDEX: 25.56 KG/M2 | WEIGHT: 126.8 LBS

## 2025-05-14 DIAGNOSIS — M70.21 OLECRANON BURSITIS OF RIGHT ELBOW: Chronic | ICD-10-CM

## 2025-05-14 DIAGNOSIS — G56.03 CARPAL TUNNEL SYNDROME, BILATERAL: Chronic | ICD-10-CM

## 2025-05-14 DIAGNOSIS — M25.531 WRIST PAIN, RIGHT: Primary | Chronic | ICD-10-CM

## 2025-05-14 DIAGNOSIS — M25.521 RIGHT ELBOW PAIN: Chronic | ICD-10-CM

## 2025-05-14 PROBLEM — G89.29 CHRONIC PELVIC PAIN IN FEMALE: Status: RESOLVED | Noted: 2023-09-07 | Resolved: 2025-05-14

## 2025-05-14 PROBLEM — R10.2 CHRONIC PELVIC PAIN IN FEMALE: Status: RESOLVED | Noted: 2023-09-07 | Resolved: 2025-05-14

## 2025-05-14 RX ORDER — HYDROCODONE BITARTRATE AND ACETAMINOPHEN 10; 325 MG/1; MG/1
1 TABLET ORAL EVERY 8 HOURS PRN
Qty: 9 TABLET | Refills: 0 | Status: SHIPPED | OUTPATIENT
Start: 2025-05-14

## 2025-05-14 RX ORDER — IBUPROFEN 600 MG/1
600 TABLET, FILM COATED ORAL EVERY 6 HOURS PRN
Qty: 60 TABLET | Refills: 2 | Status: CANCELLED | OUTPATIENT
Start: 2025-05-14

## 2025-05-20 PROBLEM — M70.21 OLECRANON BURSITIS OF RIGHT ELBOW: Status: ACTIVE | Noted: 2025-05-20

## 2025-05-20 RX ORDER — TRIAMCINOLONE ACETONIDE 40 MG/ML
40 INJECTION, SUSPENSION INTRA-ARTICULAR; INTRAMUSCULAR
Status: COMPLETED | OUTPATIENT
Start: 2025-05-20 | End: 2025-05-20

## 2025-05-20 RX ADMIN — TRIAMCINOLONE ACETONIDE 40 MG: 40 INJECTION, SUSPENSION INTRA-ARTICULAR; INTRAMUSCULAR at 20:13

## 2025-05-21 NOTE — PROGRESS NOTES
"Chief Complaint  Carpal Tunnel (Right arm/hand swelling. Pt states due carpal tunnel. 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  Denies any known injury or trauma she has had progressive symptoms that are present how they are today probably from worsening carpal tunnel and cubital tunnel versus olecranon bursitis.        Objective   Vital Signs:   /63 (BP Location: Left arm, Patient Position: Sitting, Cuff Size: Adult)   Pulse 84   Temp 97.6 °F (36.4 °C)   Resp 16   Ht 149.9 cm (59\")   Wt 57.5 kg (126 lb 12.8 oz)   SpO2 100%   BMI 25.61 kg/m²       Physical Exam  Vitals reviewed.   Constitutional:       Appearance: Normal appearance. She is well-developed.   HENT:      Head: Normocephalic and atraumatic.      Right Ear: External ear normal.      Left Ear: External ear normal.      Nose: Nose normal.   Eyes:      Conjunctiva/sclera: Conjunctivae normal.      Pupils: Pupils are equal, round, and reactive to light.   Cardiovascular:      Rate and Rhythm: Normal rate.   Pulmonary:      Effort: Pulmonary effort is normal.      Breath sounds: Normal breath sounds.   Abdominal:      General: There is no distension.   Musculoskeletal:      Right wrist: Swelling, effusion and tenderness present.   Skin:     General: Skin is warm and dry.   Neurological:      Mental Status: She is alert and oriented to person, place, and time. Mental status is at baseline.      Sensory: Sensory deficit present.      Motor: Weakness present.   Psychiatric:         Mood and Affect: Mood and affect normal.         Behavior: Behavior normal.         Thought Content: Thought content normal.         Judgment: Judgment " normal.          Result Review :   The following data was reviewed by: Jagdeep Carpio DO on 05/14/2025:         Results          Arthrocentesis    Date/Time: 5/20/2025 8:13 PM    Performed by: Jagdeep Carpio DO  Authorized by: Jagdeep Carpio DO  Indications: pain   Body area: wrist  Joint: right wrist  Local anesthesia used: yes    Anesthesia:  Local anesthesia used: yes  Local Anesthetic: lidocaine 1% with epinephrine  Anesthetic total: 3 mL    Sedation:  Patient sedated: no    Needle gauge: 25.  Ultrasound guidance: no  Approach: medial  Aspirate: clear  Aspirate amount: 0.1 mL  Meds administered: 40 mg triamcinolone acetonide 40 MG/ML  Patient tolerance: patient tolerated the procedure well with no immediate complications              Assessment and Plan    Diagnoses and all orders for this visit:    1. Wrist pain, right (Primary)  -     Ambulatory Referral to Orthopedic Surgery  -     HYDROcodone-acetaminophen (NORCO)  MG per tablet; Take 1 tablet by mouth Every 8 (Eight) Hours As Needed for Moderate Pain.  Dispense: 9 tablet; Refill: 0  -     Ambulatory Referral to Physical Therapy for Evaluation & Treatment    2. Right elbow pain  -     Ambulatory Referral to Orthopedic Surgery  -     HYDROcodone-acetaminophen (NORCO)  MG per tablet; Take 1 tablet by mouth Every 8 (Eight) Hours As Needed for Moderate Pain.  Dispense: 9 tablet; Refill: 0  -     Ambulatory Referral to Physical Therapy for Evaluation & Treatment    3. Carpal tunnel syndrome, bilateral  -     Ambulatory Referral to Orthopedic Surgery  -     HYDROcodone-acetaminophen (NORCO)  MG per tablet; Take 1 tablet by mouth Every 8 (Eight) Hours As Needed for Moderate Pain.  Dispense: 9 tablet; Refill: 0  -     Ambulatory Referral to Physical Therapy for Evaluation & Treatment    4. Olecranon bursitis of right elbow    Other orders  -     Arthrocentesis        Assessment & Plan    Discussed with patient treating increasing pain and symptoms  with injection directed at carpal tunnel joint, additionally wearing night splints to help symptoms and strain on wrist bilaterally, additionally strap or other devices for elbow pain arthritis bursitis with reduced activities at work as able to give patient's joints arms wrist time to heal or recover    PE see another provider recommended EMG or further studies and seeing specialist, consider and discussed physical therapy or think she can do at home additionally to improve          Follow Up   Return in about 4 weeks (around 6/11/2025), or if symptoms worsen or fail to improve, for Recheck, Next scheduled follow up.    Patient was given instructions and counseling regarding her condition or for health maintenance advice. Please see specific information pulled into the AVS if appropriate.       Transcribed from ambient dictation for Jagdeep Carpio DO by Jagdeep Carpio DO.  05/20/25   20:10 EDT    Patient or patient representative verbalized consent for the use of Ambient Listening during the visit with  Jagdeep Carpio DO for chart documentation. 5/20/2025  20:13 EDT

## 2025-06-03 ENCOUNTER — HOSPITAL ENCOUNTER (OUTPATIENT)
Facility: HOSPITAL | Age: 25
Discharge: HOME | End: 2025-06-03
Admitting: NURSE PRACTITIONER
Payer: COMMERCIAL

## 2025-06-03 DIAGNOSIS — M25.539 PAIN IN WRIST, UNSPECIFIED LATERALITY: ICD-10-CM

## 2025-06-03 PROCEDURE — 95886 MUSC TEST DONE W/N TEST COMP: CPT

## 2025-06-03 PROCEDURE — 95911 NRV CNDJ TEST 9-10 STUDIES: CPT

## 2025-06-10 RX ORDER — IBUPROFEN 600 MG/1
600 TABLET, FILM COATED ORAL EVERY 6 HOURS PRN
Status: CANCELLED | OUTPATIENT
Start: 2025-06-10

## 2025-06-12 ENCOUNTER — TELEPHONE (OUTPATIENT)
Dept: FAMILY MEDICINE CLINIC | Facility: CLINIC | Age: 25
End: 2025-06-12

## 2025-06-12 RX ORDER — IBUPROFEN 600 MG/1
600 TABLET, FILM COATED ORAL EVERY 6 HOURS PRN
Qty: 40 TABLET | Refills: 2 | Status: SHIPPED | OUTPATIENT
Start: 2025-06-12

## 2025-06-12 NOTE — TELEPHONE ENCOUNTER
Caller: Riri Martinez    Relationship: Self    Best call back number: [phone]    What is the best time to reach you: ANYTIME    Who are you requesting to speak with (clinical staff, provider,  specific staff member): NURSE     What was the call regarding: PATIENT IS CALLING WANTING TO KNOW WHY HER REQUEST FOR IBUPROFEN HAS BEEN DENIED REQUEST WAS SENT ON 6/3/25 AND PHARMACY TOLD HER IT HAD BEEN DENIED.  PLEASE ADVISE.

## 2025-07-15 ENCOUNTER — TELEMEDICINE (OUTPATIENT)
Dept: FAMILY MEDICINE CLINIC | Facility: TELEHEALTH | Age: 25
End: 2025-07-15
Payer: COMMERCIAL

## 2025-07-15 DIAGNOSIS — B34.9 VIRAL ILLNESS: Primary | ICD-10-CM

## 2025-07-15 PROCEDURE — 1159F MED LIST DOCD IN RCRD: CPT | Performed by: NURSE PRACTITIONER

## 2025-07-15 PROCEDURE — 1160F RVW MEDS BY RX/DR IN RCRD: CPT | Performed by: NURSE PRACTITIONER

## 2025-07-15 PROCEDURE — 99213 OFFICE O/P EST LOW 20 MIN: CPT | Performed by: NURSE PRACTITIONER

## 2025-07-15 RX ORDER — ONDANSETRON 4 MG/1
4 TABLET, ORALLY DISINTEGRATING ORAL EVERY 8 HOURS PRN
Qty: 15 TABLET | Refills: 0 | Status: SHIPPED | OUTPATIENT
Start: 2025-07-15

## 2025-07-15 RX ORDER — PREDNISONE 20 MG/1
20 TABLET ORAL 2 TIMES DAILY
Qty: 10 TABLET | Refills: 0 | Status: SHIPPED | OUTPATIENT
Start: 2025-07-15 | End: 2025-07-20

## 2025-07-15 RX ORDER — COVID-19 ANTIGEN TEST
1 KIT MISCELLANEOUS ONCE
Qty: 1 KIT | Refills: 0 | Status: SHIPPED | OUTPATIENT
Start: 2025-07-15 | End: 2025-07-15

## 2025-07-15 NOTE — LETTER
July 15, 2025     Patient: Riri Martinez   YOB: 2000   Date of Visit: 7/15/2025       To Whom It May Concern:    It is my medical opinion that Riri Martinez should be excused from work on 7/16/25 and 7/17/25.           Sincerely,        PHANI Askew    CC: No Recipients

## 2025-07-15 NOTE — PROGRESS NOTES
IMANI  Riri Martinez is a 25 y.o. female  presents with complaint of one week history of voice hoarseness. Then yesterday she woke up with fatigue, headache, nausea, abd cramps and dizziness. Today she reports she has the same symptoms but feels worse. She has had a slight sore throat due to forcing to talk, but does not feel like strep. She has been drinking propel. She has not vomited but has had dry heaving. No known sick contacts. She has taken ibuprofen but doesn't feel like it has helped. She does have a history of migraines but this feels different. She feels like her lips are dry and the top lip feels scaly. She has been unable to check her temperature.     Review of Systems   Constitutional:  Positive for diaphoresis and fatigue. Negative for chills.   HENT:  Negative for congestion, ear pain, rhinorrhea and sneezing.    Respiratory:  Negative for cough and shortness of breath.    Cardiovascular:  Negative for chest pain.   Gastrointestinal:  Positive for nausea. Negative for diarrhea and vomiting.   Skin:  Negative for rash.   Neurological:  Positive for headaches.       Past Medical History:   Diagnosis Date    Allergic     Anemia     Anxiety     Arthritis     Chronic back pain     Depression     Endometriosis     Endometriosis determined by laparoscopy 09/07/2023    History of tethered spinal cord     HAD SURGICAL INTERVENTION. DENIES ANY CURRENT ISSUES    Migraine     Pelvic pain     Seasonal allergies        Family History   Problem Relation Age of Onset    Diabetes Mother     Breast cancer Maternal Grandmother     Stroke Maternal Grandmother     Ovarian cancer Neg Hx     Uterine cancer Neg Hx     Colon cancer Neg Hx     Deep vein thrombosis Neg Hx     Pulmonary embolism Neg Hx        Social History     Socioeconomic History    Marital status: Single   Tobacco Use    Smoking status: Former     Current packs/day: 0.00     Average packs/day: 0.3 packs/day for 2.0 years (0.5 ttl pk-yrs)     Types:  Cigarettes     Start date: 2015     Quit date: 2017     Years since quittin.5     Passive exposure: Past    Smokeless tobacco: Never   Vaping Use    Vaping status: Never Used   Substance and Sexual Activity    Alcohol use: Never    Drug use: Never    Sexual activity: Yes     Partners: Male     Birth control/protection: Depo-provera         There were no vitals taken for this visit.    PHYSICAL EXAM  Physical Exam   Constitutional: She appears well-developed and well-nourished.   HENT:   Head: Normocephalic.   Nose: Nose normal.   Voice hoarseness   Neck: Neck normal appearance.  Pulmonary/Chest: Effort normal.   Neurological: She is alert.   Psychiatric: She has a normal mood and affect. Her speech is normal.       Diagnoses and all orders for this visit:    1. Viral illness (Primary)  -     ondansetron ODT (ZOFRAN-ODT) 4 MG disintegrating tablet; Place 1 tablet on the tongue Every 8 (Eight) Hours As Needed for Nausea or Vomiting.  Dispense: 15 tablet; Refill: 0  -     COVID-19 At Home Antigen Test kit; 1 kit by In Vitro route 1 time for 1 dose.  Dispense: 1 kit; Refill: 0  -     predniSONE (DELTASONE) 20 MG tablet; Take 1 tablet by mouth 2 (Two) Times a Day for 5 days.  Dispense: 10 tablet; Refill: 0      Avoid taking ibuprofen while taking prednisone. Pt verbalized understanding.     FOLLOW-UP  As discussed during visit with [facility], if symptoms worsen or fail to improve, follow-up with PCP/Urgent Care/Emergency Department.    Patient verbalizes understanding of medications, instructions for treatment and follow-up.    Ada Cherry, PHANI  07/15/2025  10:28 EDT      Mode of Visit: Video  Location of patient: -HOME-  Location of provider: Home  You have chosen to receive care through a telehealth visit.  The patient has signed the video visit consent form.  The visit included audio and video interaction. No technical issues occurred during this visit.

## 2025-07-17 ENCOUNTER — TELEMEDICINE (OUTPATIENT)
Dept: FAMILY MEDICINE CLINIC | Facility: TELEHEALTH | Age: 25
End: 2025-07-17
Payer: COMMERCIAL

## 2025-07-17 DIAGNOSIS — B34.9 VIRAL ILLNESS: Primary | ICD-10-CM

## 2025-07-17 PROCEDURE — 99213 OFFICE O/P EST LOW 20 MIN: CPT | Performed by: NURSE PRACTITIONER

## 2025-07-17 RX ORDER — DIPHENHYDRAMINE HCL 25 MG
25 TABLET ORAL NIGHTLY PRN
Qty: 10 TABLET | Refills: 0 | Status: SHIPPED | OUTPATIENT
Start: 2025-07-17 | End: 2025-07-27

## 2025-07-17 NOTE — LETTER
July 17, 2025     Patient: Riri Martinez   YOB: 2000   Date of Visit: 7/17/2025       To Whom It May Concern:    It is my medical opinion that Riri Martinez should be excused from work on 7/18/25.           Sincerely,        PHANI Askew    CC: No Recipients

## 2025-07-17 NOTE — PROGRESS NOTES
IMANI  Riri Martinez is a 25 y.o. female  presents with complaint of ongoing symptoms. Currently taking prednisone and she feels like her voice hoarseness has improved slightly but she has had difficulty sleeping causing her to have headaches and continued fatigue. Would like work excuse for tomorrow.     Review of Systems    Past Medical History:   Diagnosis Date    Allergic     Anemia     Anxiety     Arthritis     Chronic back pain     Depression     Endometriosis     Endometriosis determined by laparoscopy 2023    History of tethered spinal cord     HAD SURGICAL INTERVENTION. DENIES ANY CURRENT ISSUES    Migraine     Pelvic pain     Seasonal allergies        Family History   Problem Relation Age of Onset    Diabetes Mother     Breast cancer Maternal Grandmother     Stroke Maternal Grandmother     Ovarian cancer Neg Hx     Uterine cancer Neg Hx     Colon cancer Neg Hx     Deep vein thrombosis Neg Hx     Pulmonary embolism Neg Hx        Social History     Socioeconomic History    Marital status: Single   Tobacco Use    Smoking status: Former     Current packs/day: 0.00     Average packs/day: 0.3 packs/day for 2.0 years (0.5 ttl pk-yrs)     Types: Cigarettes     Start date: 2015     Quit date:      Years since quittin.5     Passive exposure: Past    Smokeless tobacco: Never   Vaping Use    Vaping status: Never Used   Substance and Sexual Activity    Alcohol use: Never    Drug use: Never    Sexual activity: Yes     Partners: Male     Birth control/protection: Depo-provera         There were no vitals taken for this visit.    PHYSICAL EXAM  Physical Exam   Constitutional: She appears well-developed and well-nourished.   HENT:   Head: Normocephalic.   Nose: Nose normal.   Voice hoarseness (improved from last visit)   Neck: Neck normal appearance.  Pulmonary/Chest: Effort normal.   Neurological: She is alert.   Psychiatric: She has a normal mood and affect. Her speech is normal.       Diagnoses and  all orders for this visit:    1. Viral illness (Primary)  -     diphenhydrAMINE (Benadryl Allergy) 25 MG tablet; Take 1 tablet by mouth At Night As Needed for Allergies for up to 10 days.  Dispense: 10 tablet; Refill: 0    Will Rx benadryl to take at bedtime to help with difficulty sleeping as well as may dry up possible drainage causing hoarseness. Pt verbalized understanding.       FOLLOW-UP  As discussed during visit with [facility], if symptoms worsen or fail to improve, follow-up with PCP/Urgent Care/Emergency Department.    Patient verbalizes understanding of medications, instructions for treatment and follow-up.    Ada Cherry, PHANI  07/17/2025  10:57 EDT      Mode of Visit: Video  Location of patient: -HOME-  Location of provider: Home  You have chosen to receive care through a telehealth visit.  The patient has signed the video visit consent form.  The visit included audio and video interaction. No technical issues occurred during this visit.

## 2025-07-24 ENCOUNTER — CLINICAL SUPPORT (OUTPATIENT)
Dept: FAMILY MEDICINE CLINIC | Facility: CLINIC | Age: 25
End: 2025-07-24
Payer: COMMERCIAL

## 2025-07-24 DIAGNOSIS — Z30.013 ENCOUNTER FOR INITIAL PRESCRIPTION OF INJECTABLE CONTRACEPTIVE: Primary | ICD-10-CM

## 2025-07-24 PROCEDURE — 96372 THER/PROPH/DIAG INJ SC/IM: CPT | Performed by: NURSE PRACTITIONER

## 2025-07-24 RX ORDER — MEDROXYPROGESTERONE ACETATE 150 MG/ML
150 INJECTION, SUSPENSION INTRAMUSCULAR
Status: SHIPPED | OUTPATIENT
Start: 2025-07-24

## 2025-07-24 RX ORDER — MEDROXYPROGESTERONE ACETATE 150 MG/ML
1 INJECTION, SUSPENSION INTRAMUSCULAR
Start: 2025-07-24

## 2025-07-24 RX ADMIN — MEDROXYPROGESTERONE ACETATE 150 MG: 150 INJECTION, SUSPENSION INTRAMUSCULAR at 08:47

## 2025-08-26 ENCOUNTER — OFFICE VISIT (OUTPATIENT)
Dept: ORTHOPEDIC SURGERY | Facility: CLINIC | Age: 25
End: 2025-08-26
Payer: COMMERCIAL

## 2025-08-26 VITALS — OXYGEN SATURATION: 99 % | HEART RATE: 72 BPM | SYSTOLIC BLOOD PRESSURE: 119 MMHG | DIASTOLIC BLOOD PRESSURE: 71 MMHG

## 2025-08-26 DIAGNOSIS — G56.01 CARPAL TUNNEL SYNDROME OF RIGHT WRIST: Primary | ICD-10-CM

## 2025-08-26 DIAGNOSIS — G56.03 CARPAL TUNNEL SYNDROME, BILATERAL: ICD-10-CM

## 2025-08-26 RX ORDER — ACETAMINOPHEN AND CODEINE PHOSPHATE 300; 30 MG/1; MG/1
TABLET ORAL
COMMUNITY
Start: 2025-08-18

## 2025-08-26 RX ORDER — CHLORHEXIDINE GLUCONATE ORAL RINSE 1.2 MG/ML
SOLUTION DENTAL
COMMUNITY
Start: 2025-08-18

## 2025-08-26 RX ORDER — AMOXICILLIN 500 MG/1
CAPSULE ORAL
COMMUNITY
Start: 2025-08-14

## 2025-08-26 RX ORDER — DICLOFENAC SODIUM 75 MG/1
75 TABLET, DELAYED RELEASE ORAL 2 TIMES DAILY
Qty: 60 TABLET | Refills: 0 | Status: SHIPPED | OUTPATIENT
Start: 2025-08-26

## (undated) DEVICE — SYR LUERLOK 30CC

## (undated) DEVICE — NEEDLE,18GX1.5",REG,BEVEL: Brand: MEDLINE

## (undated) DEVICE — CVR HNDL LT SURG ACCSSRY BLU STRL

## (undated) DEVICE — VAGINAL PREP TRAY: Brand: MEDLINE INDUSTRIES, INC.

## (undated) DEVICE — LAPAROSCOPIC TROCAR SLEEVE/SINGLE USE: Brand: KII® OPTICAL ACCESS SYSTEM

## (undated) DEVICE — LAPAROSCOPIC SCISSORS: Brand: EPIX LAPAROSCOPIC SCISSORS

## (undated) DEVICE — C SECTION PACK: Brand: MEDLINE INDUSTRIES, INC.

## (undated) DEVICE — INTENDED FOR TISSUE SEPARATION, AND OTHER PROCEDURES THAT REQUIRE A SHARP SURGICAL BLADE TO PUNCTURE OR CUT.: Brand: BARD-PARKER ® CARBON RIB-BACK BLADES

## (undated) DEVICE — SOL IRRG H2O BG 3000ML STRL

## (undated) DEVICE — SYR LUERLOK 50ML

## (undated) DEVICE — LITHOTOMY-YELLOW FINS: Brand: MEDLINE INDUSTRIES, INC.

## (undated) DEVICE — PAD GRND REM POLYHESIVE A/ DISP

## (undated) DEVICE — DEV TRANSF BLD W/LUER ADPT CA/198

## (undated) DEVICE — GLV SURG BIOGEL LTX PF 7

## (undated) DEVICE — SUT MONOCRYL PLS 3/0 CT1 UD/MF 90CM MCP944H

## (undated) DEVICE — KT ANTI FOG W/FLD AND SPNG

## (undated) DEVICE — APPL CHLORAPREP HI/LITE 26ML ORNG

## (undated) DEVICE — ADHS LIQ MASTISOL 2/3ML

## (undated) DEVICE — STERILE POLYISOPRENE POWDER-FREE SURGICAL GLOVES: Brand: PROTEXIS

## (undated) DEVICE — TROCAR: Brand: KII OPTICAL ACCESS SYSTEM

## (undated) DEVICE — PCH SURG INST LAP 2 LF

## (undated) DEVICE — SLV SCD KN/LEN ADJ EXPRSS BLENDED MD 1P/U

## (undated) DEVICE — COVADERM PLUS: Brand: DEROYAL

## (undated) DEVICE — Device: Brand: PORTEX

## (undated) DEVICE — ENDOPATH PNEUMONEEDLE INSUFFLATION NEEDLES WITH LUER LOCK CONNECTORS 120MM: Brand: ENDOPATH

## (undated) DEVICE — SYR LL TP 10ML STRL

## (undated) DEVICE — GLV SURG BIOGEL LTX PF 7 1/2

## (undated) DEVICE — SUT MNCRYL PLS ANTIB UD 4/0 PS2 18IN

## (undated) DEVICE — TROCAR: Brand: KII® SLEEVE

## (undated) DEVICE — DUAL LUMEN STOMACH TUBE,ANTI-REFLUX VALVE: Brand: SALEM SUMP

## (undated) DEVICE — TOWEL,OR,DSP,ST,BLUE,STD,4/PK,20PK/CS: Brand: MEDLINE

## (undated) DEVICE — CATH URETH INTRMIT ALLPURP LTX 16F RED

## (undated) DEVICE — SUT VIC 0 CTX 36IN J978H

## (undated) DEVICE — PENCL E/S HNDSWCH ROCKR CB

## (undated) DEVICE — NDL HYPO ECLPS SFTY 22G 1 1/2IN

## (undated) DEVICE — SHEET,DRAPE,70X85,STERILE: Brand: MEDLINE

## (undated) DEVICE — STRIP,CLOSURE,WOUND,MEDI-STRIP,1/2X4: Brand: MEDLINE